# Patient Record
Sex: FEMALE | Race: WHITE | NOT HISPANIC OR LATINO | Employment: FULL TIME
[De-identification: names, ages, dates, MRNs, and addresses within clinical notes are randomized per-mention and may not be internally consistent; named-entity substitution may affect disease eponyms.]

---

## 2017-07-29 ENCOUNTER — HEALTH MAINTENANCE LETTER (OUTPATIENT)
Age: 57
End: 2017-07-29

## 2018-08-05 ENCOUNTER — HEALTH MAINTENANCE LETTER (OUTPATIENT)
Age: 58
End: 2018-08-05

## 2019-11-04 ENCOUNTER — HEALTH MAINTENANCE LETTER (OUTPATIENT)
Age: 59
End: 2019-11-04

## 2020-11-22 ENCOUNTER — HEALTH MAINTENANCE LETTER (OUTPATIENT)
Age: 60
End: 2020-11-22

## 2021-09-18 ENCOUNTER — HEALTH MAINTENANCE LETTER (OUTPATIENT)
Age: 61
End: 2021-09-18

## 2021-11-13 ENCOUNTER — HEALTH MAINTENANCE LETTER (OUTPATIENT)
Age: 61
End: 2021-11-13

## 2022-01-08 ENCOUNTER — HEALTH MAINTENANCE LETTER (OUTPATIENT)
Age: 62
End: 2022-01-08

## 2022-05-25 ENCOUNTER — APPOINTMENT (OUTPATIENT)
Dept: MRI IMAGING | Facility: HOSPITAL | Age: 62
End: 2022-05-25
Attending: EMERGENCY MEDICINE
Payer: COMMERCIAL

## 2022-05-25 ENCOUNTER — HOSPITAL ENCOUNTER (INPATIENT)
Facility: HOSPITAL | Age: 62
LOS: 2 days | Discharge: ANOTHER HOSPITAL | End: 2022-05-27
Attending: EMERGENCY MEDICINE | Admitting: INTERNAL MEDICINE
Payer: COMMERCIAL

## 2022-05-25 DIAGNOSIS — M85.80 OSTEOPENIA, UNSPECIFIED LOCATION: ICD-10-CM

## 2022-05-25 DIAGNOSIS — G93.2 BENIGN INTRACRANIAL HYPERTENSION: Primary | ICD-10-CM

## 2022-05-25 DIAGNOSIS — R51.9 HEADACHE ON TOP OF HEAD: ICD-10-CM

## 2022-05-25 PROBLEM — Z86.711 HISTORY OF PULMONARY EMBOLUS (PE): Status: ACTIVE | Noted: 2019-09-05

## 2022-05-25 PROBLEM — Z85.3 HISTORY OF BREAST CANCER IN FEMALE: Status: ACTIVE | Noted: 2020-09-22

## 2022-05-25 PROBLEM — G47.33 OSA (OBSTRUCTIVE SLEEP APNEA): Status: ACTIVE | Noted: 2019-09-05

## 2022-05-25 LAB
ALBUMIN SERPL-MCNC: 3.4 G/DL (ref 3.5–5)
ALP SERPL-CCNC: 156 U/L (ref 45–120)
ALT SERPL W P-5'-P-CCNC: 20 U/L (ref 0–45)
ANION GAP SERPL CALCULATED.3IONS-SCNC: 11 MMOL/L (ref 5–18)
AST SERPL W P-5'-P-CCNC: 15 U/L (ref 0–40)
BASOPHILS # BLD AUTO: 0.1 10E3/UL (ref 0–0.2)
BASOPHILS NFR BLD AUTO: 1 %
BILIRUB DIRECT SERPL-MCNC: 0.2 MG/DL
BILIRUB SERPL-MCNC: 0.4 MG/DL (ref 0–1)
BUN SERPL-MCNC: 12 MG/DL (ref 8–22)
C REACTIVE PROTEIN LHE: 8.1 MG/DL (ref 0–0.8)
C REACTIVE PROTEIN LHE: 8.2 MG/DL (ref 0–0.8)
CALCIUM SERPL-MCNC: 10 MG/DL (ref 8.5–10.5)
CHLORIDE BLD-SCNC: 99 MMOL/L (ref 98–107)
CO2 SERPL-SCNC: 27 MMOL/L (ref 22–31)
CREAT SERPL-MCNC: 0.8 MG/DL (ref 0.6–1.1)
EOSINOPHIL # BLD AUTO: 0.3 10E3/UL (ref 0–0.7)
EOSINOPHIL NFR BLD AUTO: 3 %
ERYTHROCYTE [DISTWIDTH] IN BLOOD BY AUTOMATED COUNT: 11.9 % (ref 10–15)
ERYTHROCYTE [SEDIMENTATION RATE] IN BLOOD BY WESTERGREN METHOD: 62 MM/HR (ref 0–20)
GFR SERPL CREATININE-BSD FRML MDRD: 84 ML/MIN/1.73M2
GLUCOSE BLD-MCNC: 104 MG/DL (ref 70–125)
HCT VFR BLD AUTO: 43.5 % (ref 35–47)
HGB BLD-MCNC: 14.8 G/DL (ref 11.7–15.7)
IMM GRANULOCYTES # BLD: 0.1 10E3/UL
IMM GRANULOCYTES NFR BLD: 1 %
INR PPP: 1.05 (ref 0.85–1.15)
LACTATE SERPL-SCNC: 1.3 MMOL/L (ref 0.7–2)
LYMPHOCYTES # BLD AUTO: 1.8 10E3/UL (ref 0.8–5.3)
LYMPHOCYTES NFR BLD AUTO: 19 %
MAGNESIUM SERPL-MCNC: 2 MG/DL (ref 1.8–2.6)
MCH RBC QN AUTO: 30.6 PG (ref 26.5–33)
MCHC RBC AUTO-ENTMCNC: 34 G/DL (ref 31.5–36.5)
MCV RBC AUTO: 90 FL (ref 78–100)
MONOCYTES # BLD AUTO: 1 10E3/UL (ref 0–1.3)
MONOCYTES NFR BLD AUTO: 10 %
NEUTROPHILS # BLD AUTO: 6.5 10E3/UL (ref 1.6–8.3)
NEUTROPHILS NFR BLD AUTO: 66 %
NRBC # BLD AUTO: 0 10E3/UL
NRBC BLD AUTO-RTO: 0 /100
PLATELET # BLD AUTO: 410 10E3/UL (ref 150–450)
POTASSIUM BLD-SCNC: 3.5 MMOL/L (ref 3.5–5)
PROT SERPL-MCNC: 7.9 G/DL (ref 6–8)
RBC # BLD AUTO: 4.83 10E6/UL (ref 3.8–5.2)
SARS-COV-2 RNA RESP QL NAA+PROBE: NEGATIVE
SODIUM SERPL-SCNC: 137 MMOL/L (ref 136–145)
TSH SERPL DL<=0.005 MIU/L-ACNC: 1.73 UIU/ML (ref 0.3–5)
WBC # BLD AUTO: 9.6 10E3/UL (ref 4–11)

## 2022-05-25 PROCEDURE — 85652 RBC SED RATE AUTOMATED: CPT | Performed by: EMERGENCY MEDICINE

## 2022-05-25 PROCEDURE — 84443 ASSAY THYROID STIM HORMONE: CPT | Performed by: EMERGENCY MEDICINE

## 2022-05-25 PROCEDURE — 82248 BILIRUBIN DIRECT: CPT | Performed by: EMERGENCY MEDICINE

## 2022-05-25 PROCEDURE — 250N000011 HC RX IP 250 OP 636: Performed by: INTERNAL MEDICINE

## 2022-05-25 PROCEDURE — 36415 COLL VENOUS BLD VENIPUNCTURE: CPT | Performed by: EMERGENCY MEDICINE

## 2022-05-25 PROCEDURE — 85014 HEMATOCRIT: CPT | Performed by: EMERGENCY MEDICINE

## 2022-05-25 PROCEDURE — 83605 ASSAY OF LACTIC ACID: CPT | Performed by: EMERGENCY MEDICINE

## 2022-05-25 PROCEDURE — C9803 HOPD COVID-19 SPEC COLLECT: HCPCS

## 2022-05-25 PROCEDURE — 87798 DETECT AGENT NOS DNA AMP: CPT | Performed by: INTERNAL MEDICINE

## 2022-05-25 PROCEDURE — 258N000003 HC RX IP 258 OP 636: Performed by: INTERNAL MEDICINE

## 2022-05-25 PROCEDURE — 70544 MR ANGIOGRAPHY HEAD W/O DYE: CPT

## 2022-05-25 PROCEDURE — 250N000011 HC RX IP 250 OP 636: Performed by: EMERGENCY MEDICINE

## 2022-05-25 PROCEDURE — 85610 PROTHROMBIN TIME: CPT | Performed by: EMERGENCY MEDICINE

## 2022-05-25 PROCEDURE — A9585 GADOBUTROL INJECTION: HCPCS | Performed by: EMERGENCY MEDICINE

## 2022-05-25 PROCEDURE — 255N000002 HC RX 255 OP 636: Performed by: EMERGENCY MEDICINE

## 2022-05-25 PROCEDURE — 86140 C-REACTIVE PROTEIN: CPT | Performed by: EMERGENCY MEDICINE

## 2022-05-25 PROCEDURE — 83735 ASSAY OF MAGNESIUM: CPT | Performed by: EMERGENCY MEDICINE

## 2022-05-25 PROCEDURE — 80053 COMPREHEN METABOLIC PANEL: CPT | Performed by: EMERGENCY MEDICINE

## 2022-05-25 PROCEDURE — 93005 ELECTROCARDIOGRAM TRACING: CPT | Performed by: EMERGENCY MEDICINE

## 2022-05-25 PROCEDURE — 70553 MRI BRAIN STEM W/O & W/DYE: CPT

## 2022-05-25 PROCEDURE — 87635 SARS-COV-2 COVID-19 AMP PRB: CPT | Performed by: EMERGENCY MEDICINE

## 2022-05-25 PROCEDURE — 250N000013 HC RX MED GY IP 250 OP 250 PS 637

## 2022-05-25 PROCEDURE — 87040 BLOOD CULTURE FOR BACTERIA: CPT | Performed by: EMERGENCY MEDICINE

## 2022-05-25 PROCEDURE — 96365 THER/PROPH/DIAG IV INF INIT: CPT

## 2022-05-25 PROCEDURE — 70549 MR ANGIOGRAPH NECK W/O&W/DYE: CPT

## 2022-05-25 PROCEDURE — 200N000001 HC R&B ICU

## 2022-05-25 PROCEDURE — 999N000157 HC STATISTIC RCP TIME EA 10 MIN

## 2022-05-25 PROCEDURE — 70544 MR ANGIOGRAPHY HEAD W/O DYE: CPT | Mod: XS

## 2022-05-25 PROCEDURE — 99223 1ST HOSP IP/OBS HIGH 75: CPT | Performed by: INTERNAL MEDICINE

## 2022-05-25 PROCEDURE — 96375 TX/PRO/DX INJ NEW DRUG ADDON: CPT

## 2022-05-25 PROCEDURE — 99285 EMERGENCY DEPT VISIT HI MDM: CPT | Mod: 25

## 2022-05-25 RX ORDER — DEXAMETHASONE SODIUM PHOSPHATE 4 MG/ML
4 INJECTION, SOLUTION INTRA-ARTICULAR; INTRALESIONAL; INTRAMUSCULAR; INTRAVENOUS; SOFT TISSUE EVERY 6 HOURS
Status: DISCONTINUED | OUTPATIENT
Start: 2022-05-25 | End: 2022-05-25

## 2022-05-25 RX ORDER — CEFAZOLIN SODIUM 1 G/50ML
1750 SOLUTION INTRAVENOUS EVERY 12 HOURS
Status: DISCONTINUED | OUTPATIENT
Start: 2022-05-25 | End: 2022-05-27

## 2022-05-25 RX ORDER — ONDANSETRON 4 MG/1
4 TABLET, ORALLY DISINTEGRATING ORAL EVERY 6 HOURS PRN
Status: DISCONTINUED | OUTPATIENT
Start: 2022-05-25 | End: 2022-05-27 | Stop reason: HOSPADM

## 2022-05-25 RX ORDER — AMPICILLIN 2 G/1
2 INJECTION, POWDER, FOR SOLUTION INTRAVENOUS EVERY 4 HOURS
Status: DISCONTINUED | OUTPATIENT
Start: 2022-05-25 | End: 2022-05-27

## 2022-05-25 RX ORDER — ALBUTEROL SULFATE 90 UG/1
2 AEROSOL, METERED RESPIRATORY (INHALATION) EVERY 6 HOURS PRN
COMMUNITY

## 2022-05-25 RX ORDER — LIDOCAINE 40 MG/G
CREAM TOPICAL
Status: DISCONTINUED | OUTPATIENT
Start: 2022-05-25 | End: 2022-05-27 | Stop reason: HOSPADM

## 2022-05-25 RX ORDER — ASPIRIN 81 MG/1
81 TABLET ORAL DAILY
Status: ON HOLD | COMMUNITY
End: 2022-05-27

## 2022-05-25 RX ORDER — GADOBUTROL 604.72 MG/ML
12 INJECTION INTRAVENOUS ONCE
Status: COMPLETED | OUTPATIENT
Start: 2022-05-25 | End: 2022-05-25

## 2022-05-25 RX ORDER — ONDANSETRON 2 MG/ML
4 INJECTION INTRAMUSCULAR; INTRAVENOUS EVERY 6 HOURS PRN
Status: DISCONTINUED | OUTPATIENT
Start: 2022-05-25 | End: 2022-05-27 | Stop reason: HOSPADM

## 2022-05-25 RX ORDER — AMOXICILLIN 250 MG
1 CAPSULE ORAL 2 TIMES DAILY
Status: DISCONTINUED | OUTPATIENT
Start: 2022-05-25 | End: 2022-05-27 | Stop reason: HOSPADM

## 2022-05-25 RX ORDER — HYDROCHLOROTHIAZIDE 25 MG/1
25 TABLET ORAL DAILY
COMMUNITY

## 2022-05-25 RX ORDER — ALBUTEROL SULFATE 90 UG/1
2 AEROSOL, METERED RESPIRATORY (INHALATION) EVERY 6 HOURS PRN
Status: DISCONTINUED | OUTPATIENT
Start: 2022-05-25 | End: 2022-05-27 | Stop reason: HOSPADM

## 2022-05-25 RX ORDER — OXYCODONE HYDROCHLORIDE 5 MG/1
5 TABLET ORAL EVERY 6 HOURS PRN
Status: DISCONTINUED | OUTPATIENT
Start: 2022-05-25 | End: 2022-05-27 | Stop reason: HOSPADM

## 2022-05-25 RX ORDER — ACETAMINOPHEN 325 MG/1
975 TABLET ORAL ONCE
Status: COMPLETED | OUTPATIENT
Start: 2022-05-25 | End: 2022-05-25

## 2022-05-25 RX ORDER — DEXAMETHASONE SODIUM PHOSPHATE 10 MG/ML
6 INJECTION, SOLUTION INTRAMUSCULAR; INTRAVENOUS EVERY 6 HOURS
Status: DISCONTINUED | OUTPATIENT
Start: 2022-05-25 | End: 2022-05-26

## 2022-05-25 RX ORDER — CEFTRIAXONE 2 G/1
2 INJECTION, POWDER, FOR SOLUTION INTRAMUSCULAR; INTRAVENOUS EVERY 12 HOURS
Status: DISCONTINUED | OUTPATIENT
Start: 2022-05-25 | End: 2022-05-27

## 2022-05-25 RX ORDER — METOCLOPRAMIDE HYDROCHLORIDE 5 MG/ML
10 INJECTION INTRAMUSCULAR; INTRAVENOUS ONCE
Status: DISCONTINUED | OUTPATIENT
Start: 2022-05-25 | End: 2022-05-27 | Stop reason: HOSPADM

## 2022-05-25 RX ORDER — HYDROCHLOROTHIAZIDE 25 MG/1
25 TABLET ORAL DAILY
Status: DISCONTINUED | OUTPATIENT
Start: 2022-05-26 | End: 2022-05-27 | Stop reason: HOSPADM

## 2022-05-25 RX ORDER — ACETAMINOPHEN 650 MG/1
650 SUPPOSITORY RECTAL EVERY 6 HOURS PRN
Status: DISCONTINUED | OUTPATIENT
Start: 2022-05-25 | End: 2022-05-27 | Stop reason: HOSPADM

## 2022-05-25 RX ORDER — HYDRALAZINE HYDROCHLORIDE 10 MG/1
10 TABLET, FILM COATED ORAL 4 TIMES DAILY PRN
Status: DISCONTINUED | OUTPATIENT
Start: 2022-05-25 | End: 2022-05-27 | Stop reason: HOSPADM

## 2022-05-25 RX ORDER — ACETAMINOPHEN 325 MG/1
650 TABLET ORAL EVERY 6 HOURS PRN
Status: DISCONTINUED | OUTPATIENT
Start: 2022-05-25 | End: 2022-05-27 | Stop reason: HOSPADM

## 2022-05-25 RX ORDER — VALSARTAN 80 MG/1
80 TABLET ORAL DAILY
Status: DISCONTINUED | OUTPATIENT
Start: 2022-05-26 | End: 2022-05-27 | Stop reason: HOSPADM

## 2022-05-25 RX ORDER — CEFTRIAXONE 2 G/1
2 INJECTION, POWDER, FOR SOLUTION INTRAMUSCULAR; INTRAVENOUS EVERY 24 HOURS
Status: DISCONTINUED | OUTPATIENT
Start: 2022-05-25 | End: 2022-05-25

## 2022-05-25 RX ORDER — AMOXICILLIN 250 MG
2 CAPSULE ORAL 2 TIMES DAILY
Status: DISCONTINUED | OUTPATIENT
Start: 2022-05-25 | End: 2022-05-27 | Stop reason: HOSPADM

## 2022-05-25 RX ORDER — SODIUM CHLORIDE 9 MG/ML
INJECTION, SOLUTION INTRAVENOUS CONTINUOUS
Status: DISCONTINUED | OUTPATIENT
Start: 2022-05-25 | End: 2022-05-27

## 2022-05-25 RX ADMIN — SODIUM CHLORIDE, PRESERVATIVE FREE: 5 INJECTION INTRAVENOUS at 23:02

## 2022-05-25 RX ADMIN — DEXAMETHASONE SODIUM PHOSPHATE 6 MG: 10 INJECTION, SOLUTION INTRAMUSCULAR; INTRAVENOUS at 22:57

## 2022-05-25 RX ADMIN — CEFTRIAXONE SODIUM 2 G: 2 INJECTION, POWDER, FOR SOLUTION INTRAMUSCULAR; INTRAVENOUS at 23:02

## 2022-05-25 RX ADMIN — GADOBUTROL 12 ML: 604.72 INJECTION INTRAVENOUS at 16:14

## 2022-05-25 RX ADMIN — ACETAMINOPHEN 975 MG: 325 TABLET ORAL at 18:41

## 2022-05-25 ASSESSMENT — ACTIVITIES OF DAILY LIVING (ADL)
ADLS_ACUITY_SCORE: 35

## 2022-05-25 ASSESSMENT — VISUAL ACUITY: OU: OTHER (SEE COMMENT)

## 2022-05-25 NOTE — ED NOTES
"ED Triage Provider Note  [facility]  Encounter Date: May 25, 2022      The patient was seen in triage to expedite ED workup.     History:  Chief Complaint   Patient presents with     Vision Changes     Headache     Parul Veliz is a 61 year old female with history of prior breast cancer, prior provoked PE (not on AC other than baby Aspirin daily), HTN presenting for evaluation of headache and vision changes. Reports 1 week of mild intermittent headache to front & left of head; no fall or injury. Yesterday at 12pm (about 26 hours ago) started having a black spot in the medial aspect of her vision in both eyes; spot does not move. No eye pain, redness, drainage. Denies any numbness, tingling, focal weakness, difficulty speaking, difficulty walking. No neck pain, fevers, sweats, chills. Has never had anything like this before. Concerned about possible stroke.    Review of Systems:  - Positive for headache, vision changes  - Negative for numbness, tingling, focal weakness, difficulty speaking, difficulty walking, fevers, sweats, chills, neck pain    Vitals:  BP (!) 185/94   Pulse 100   Temp 97.6  F (36.4  C) (Temporal)   Resp 16   Ht 1.702 m (5' 7\")   Wt 117.9 kg (260 lb)   SpO2 98%   BMI 40.72 kg/m      Brief Exam:  Sitting comfortably in chair, PERRL @ 3mm with EOMI, no eye drainage or proptosis, no visual field cut to confrontation testing, CN 2-12 intact, clear speech with no dysarthria or aphasia, negative pronator drift, 5/5 strength to all extremities with sensation to light touch intact, no tremor, steady unaccompanied gait    Otherwise normal work of breathing, normal phonation, no meningismus, no anterior neck tenderness      Medical Decision Making:  Patient arriving to the ED with problem as above. A medical screening exam was performed. Orders initiated from triage to expedite ED workup.    Outside stroke activation window but certainly would benefit from MRI. Will obtain " MRV as well to rule out CVT. Prior breast cancer warrants imaging to rule out recurrent met. Will obtain EKG, blood in the meantime as well. Doubt infectious process such as meningitis or encephalitis. Atypical migraine on the differential but has never had migraines in the past.    Orders Placed This Encounter   Procedures     MR Brain w/o & w Contrast     MRA Brain ([place]) wo Contrast     MRA Neck (Carotids) wo & w Contrast     MRV Brain wo Contrast     INR     Magnesium     TSH with free T4 reflex     Basic metabolic panel     Hepatic function panel     CRP inflammation     ECG 12-LEAD WITH MUSE (LHE)     Peripheral IV catheter     CBC with platelets differential           The patient is appropriate to wait in triage until ED room available.      Duane Daley MD  05/25/22  Emergency Medicine  [facility] EMERGENCY DEPARTMENT  [facility] [facility] [id number] [phone]  Dept: [phone]     Duane Daley MD  05/25/22 7643

## 2022-05-25 NOTE — ED PROVIDER NOTES
EMERGENCY DEPARTMENT ENCOUNTER            IMPRESSION:  Headache and visual disturbance      MEDICAL DECISION MAKING:  Patient evaluated for 1 week history of headache.  She also notes some visual disturbance.  No trauma or infectious symptoms.  No other focal neurologic symptoms.  No vomiting    On exam her blood pressure was normal.  Initially there was no fever.  Her cranial nerves are intact.  She does have however central field of vision loss.  Neurologic exam otherwise normal.  No neck stiffness.    She was given antiemetics for pain medication    EKG does not show acute arrhythmia or ischemia    MRI showed narrowing of the distal transverse sinus which can be seen in intracranial hypertension.    Case was discussed with on-call neurology who recommended neurologic consult and inflammatory markers    Patient was given Tylenol for headache    Case was discussed with hospitalist for admission.  As I was speaking with the hospitalist repeat vitals showed patient had a fever.      I reevaluated the patient and she does not appear ill.  She has no new symptoms.  I have added lactate blood cultures and COVID test          =================================================================  CHIEF COMPLAINT:  Chief Complaint   Patient presents with     Vision Changes     Headache         HPI  Parul Veliz is a 61 year old female with a history of cervical cancer s/p total abdominal hysterectomy, breast cancer s/p bilateral mastectomy, postoperative PE previously anticoagulated on Lovenox, HTN, HLD, RACHID on CPAP, and tobacco use disorder, who presents to the ED by private vehicle for evaluation of headache.    Patient reports a one week history of intermittent left sided and frontal headache. Yesterday afternoon around 1200 (~26 hours PTA) she developed a black spot in the medial aspect of her vision in both eyes, noting the spot remains in one spot and does not move. Denies eye pain, erythema, or drainage. She has  never had symptoms like this before. Patient otherwise denies numbness, paresthesias, focal weakness, gait abnormalities, speech changes, neck pain, fever, chills, or any other symptoms or concerns at this time.     I, Yasemin Howard, am serving as a scribe to document services personally performed by Dr. Marco Mehta MD, based on my observation and the provider's statements to me. I, Dr. Marco Mehta MD attest that Yasemin Howard is acting in a scribe capacity, has observed my performance of the services and has documented them in accordance with my direction.      REVIEW OF SYSTEMS   Constitutional: Denies fever, chills, unintentional weight loss or fatigue   Eyes: Positive for black spot in medial aspect of vision in both eyes. Denies discharge    HENT: Denies sore throat, ear pain or neck pain  Respiratory: Denies cough or shortness of breath    Cardiovascular: Denies chest pain, palpitations or leg swelling  GI: Denies abdominal pain, nausea, vomiting, or dark, bloody stools.    : Denies hematuria, dysuria, or flank pain  Musculoskeletal: Denies any new back pain or new muscle/joint pains  Skin: Denies rash or wound  Neurologic: Positive for intermittent left sided and frontal headache. Denies new weakness, focal weakness, or sensory changes    Lymphatic: Denies swollen glands    Psychiatric: Denies depression, suicidal ideation or homicidal ideation.    Remainder of systems reviewed, unless noted in HPI all others negative.      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      Hemangioma of skin      HTN (hypertension)      Infiltrating ductal carcinoma of LEFT breast, BX 10/9/12 10/15/2012     Lumbar disc disease      Nephrolithiasis      Obesity      RACHID (obstructive sleep apnea)      PE (pulmonary embolism) 2008    during pelvic surgery     Pericardial cyst   "      PAST SURGICAL HISTORY:  Past Surgical History:   Procedure Laterality Date     CHOLECYSTECTOMY, LAPOROSCOPIC  6/2011    Cholecystectomy, Laparoscopic     COLONOSCOPY  2011     HYSTERECTOMY, SANDRA  2/08    radical hysterectomy, BSO, hx of cx ca         CURRENT MEDICATIONS:    albuterol (PROAIR HFA/PROVENTIL HFA/VENTOLIN HFA) 108 (90 Base) MCG/ACT inhaler  aspirin 81 MG EC tablet  cetirizine (ZYRTEC) 10 MG tablet  hydrochlorothiazide (HYDRODIURIL) 25 MG tablet  valsartan (DIOVAN) 80 MG tablet        ALLERGIES:  Allergies   Allergen Reactions     Pcn [Penicillins]        FAMILY HISTORY:  Family History   Problem Relation Age of Onset     Cerebrovascular Disease Mother      Breast Cancer Maternal Aunt      Breast Cancer Paternal Aunt        SOCIAL HISTORY:   Social History     Socioeconomic History     Marital status: Single     Number of children: 0   Occupational History     Occupation:    Tobacco Use     Smoking status: Current Every Day Smoker     Packs/day: 1.00     Years: 20.00     Pack years: 20.00     Types: Cigarettes     Smokeless tobacco: Never Used     Tobacco comment: 1 pp week   Substance and Sexual Activity     Alcohol use: Yes     Comment: 1-2/week     Drug use: No     Sexual activity: Not Currently   Other Topics Concern      Service No     Blood Transfusions No     Caffeine Concern No     Occupational Exposure No     Hobby Hazards No     Sleep Concern Yes     Comment: Problems with staying asleep and getting to sleep     Stress Concern Yes     Comment: Tax season     Weight Concern No     Special Diet Yes     Comment: No green leafy  vegies     Back Care No     Exercise No     Bike Helmet No     Seat Belt Yes     Self-Exams Yes   Social History Narrative    [employer]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  ENT: Mucous membranes moist. Posterior oropharynx appears normal.  Eyes: Pupils midrange and reactive ,no conjunctival discharge, central visual field loss  Neck: No lymphadenopathy, no stridor, supple, no soft tissue swelling  Chest: No tenderness   Respiratory: Respirations even, unlabored. Lungs clear to ascultation bilaterally, in no acute respiratory distress.  Cardiovascular: Regular rate and rhythm.+2 radial pulses, equal bilaterally.  No murmurs.   GI: Abdomen soft, non-tender to palpation in all 4 quadrants. No guarding or rebound. Bowel sounds intact on all 4 quadrants.   Back: No CVA tenderness.    Musculoskeletal: Moves all 4 extremities equally, strength symmetrical on bilateral uppers and lowers.  No peripheral edema  Integument: Warm, dry. No rash. No bruising or petechiae.  Lymphatic: No cervical lymphadenopathy  Neurologic: Alert & oriented x 3. Normal speech. Grossly normal motor and sensory function. No focal deficits noted.  NIHSS = 0  Psychiatric: Normal mood and affect. Normal judgement.    ED COURSE:  2:30 PM I met with the patient, obtained history, performed an initial exam, and discussed options and plan for diagnostics and treatment here in the ED.  5:25 PM I paged for Neurology.   5:38 PM I spoke with Dr. Sky, Neurology, who recommends admission for pain management and Neurology consult. I updated the patient on these recommendations and she is in agreement. I paged for the hospitalist.   6:47 PM I spoke with Dr hospitalist, who accepts the patient for admission.      LAB:  All pertinent labs reviewed and interpreted.  Results for orders placed or performed during the hospital encounter of 05/25/22   MR Brain w/o & w Contrast    Impression    IMPRESSION:  HEAD MRI:   1.  No acute intracranial abnormality  2.  Nonspecific scattered small foci of T2 FLAIR hyperintensity in the cerebral white  matter can be seen in setting of chronic small vessel ischemic changes, prior trauma or insult, or migraines. Demyelination is not favored.    HEAD MRA:   1.  Normal MRA "[place] [employer] of [place].    NECK MRA:  1.  Normal neck MRA.    HEAD MRV:  1.  Severe stenosis of the distal transverse sinuses bilaterally, which is nonspecific but can be seen in setting of idiopathic intracranial hypertension.  2.  No additional cervical stenosis or occlusion. No thrombosis.   MRA Brain ([place]) wo Contrast    Impression    IMPRESSION:  HEAD MRI:   1.  No acute intracranial abnormality  2.  Nonspecific scattered small foci of T2 FLAIR hyperintensity in the cerebral white matter can be seen in setting of chronic small vessel ischemic changes, prior trauma or insult, or migraines. Demyelination is not favored.    HEAD MRA:   1.  Normal MRA "[place] [employer] [place].    NECK MRA:  1.  Normal neck MRA.    HEAD MRV:  1.  Severe stenosis of the distal transverse sinuses bilaterally, which is nonspecific but can be seen in setting of idiopathic intracranial hypertension.  2.  No additional cervical stenosis or occlusion. No thrombosis.   MRA Neck (Carotids) wo & w Contrast    Impression    IMPRESSION:  HEAD MRI:   1.  No acute intracranial abnormality  2.  Nonspecific scattered small foci of T2 FLAIR hyperintensity in the cerebral white matter can be seen in setting of chronic small vessel ischemic changes, prior trauma or insult, or migraines. Demyelination is not favored.    HEAD MRA:   1.  Normal MRA "[place] [employer] [place].    NECK MRA:  1.  Normal neck MRA.    HEAD MRV:  1.  Severe stenosis of the distal transverse sinuses bilaterally, which is nonspecific but can be seen in setting of idiopathic intracranial hypertension.  2.  No additional cervical stenosis or occlusion. No thrombosis.   MRV Brain wo Contrast    Impression    IMPRESSION:  HEAD MRI:   1.  No acute intracranial abnormality  2.  Nonspecific scattered small foci of T2 FLAIR  hyperintensity in the cerebral white matter can be seen in setting of chronic small vessel ischemic changes, prior trauma or insult, or migraines. Demyelination is not favored.    HEAD MRA:   1.  Normal MRA [place] of [place].    NECK MRA:  1.  Normal neck MRA.    HEAD MRV:  1.  Severe stenosis of the distal transverse sinuses bilaterally, which is nonspecific but can be seen in setting of idiopathic intracranial hypertension.  2.  No additional cervical stenosis or occlusion. No thrombosis.   Result Value Ref Range    INR 1.05 0.85 - 1.15   Result Value Ref Range    Magnesium 2.0 1.8 - 2.6 mg/dL   TSH with free T4 reflex   Result Value Ref Range    TSH 1.73 0.30 - 5.00 uIU/mL   Basic metabolic panel   Result Value Ref Range    Sodium 137 136 - 145 mmol/L    Potassium 3.5 3.5 - 5.0 mmol/L    Chloride 99 98 - 107 mmol/L    Carbon Dioxide (CO2) 27 22 - 31 mmol/L    Anion Gap 11 5 - 18 mmol/L    Urea Nitrogen 12 8 - 22 mg/dL    Creatinine 0.80 0.60 - 1.10 mg/dL    Calcium 10.0 8.5 - 10.5 mg/dL    Glucose 104 70 - 125 mg/dL    GFR Estimate 84 >60 mL/min/1.73m2   Hepatic function panel   Result Value Ref Range    Bilirubin Total 0.4 0.0 - 1.0 mg/dL    Bilirubin Direct 0.2 <=0.5 mg/dL    Protein Total 7.9 6.0 - 8.0 g/dL    Albumin 3.4 (L) 3.5 - 5.0 g/dL    Alkaline Phosphatase 156 (H) 45 - 120 U/L    AST 15 0 - 40 U/L    ALT 20 0 - 45 U/L   CRP inflammation   Result Value Ref Range    CRP 8.1 (H) 0.0 - 0.8 mg/dL   CBC with platelets and differential   Result Value Ref Range    WBC Count 9.6 4.0 - 11.0 10e3/uL    RBC Count 4.83 3.80 - 5.20 10e6/uL    Hemoglobin 14.8 11.7 - 15.7 g/dL    Hematocrit 43.5 35.0 - 47.0 %    MCV 90 78 - 100 fL    MCH 30.6 26.5 - 33.0 pg    MCHC 34.0 31.5 - 36.5 g/dL    RDW 11.9 10.0 - 15.0 %    Platelet Count 410 150 - 450 10e3/uL    % Neutrophils 66 %    % Lymphocytes 19 %    % Monocytes 10 %    % Eosinophils 3 %    % Basophils 1 %    % Immature Granulocytes 1 %    NRBCs per 100 WBC 0 <1 /100     Absolute Neutrophils 6.5 1.6 - 8.3 10e3/uL    Absolute Lymphocytes 1.8 0.8 - 5.3 10e3/uL    Absolute Monocytes 1.0 0.0 - 1.3 10e3/uL    Absolute Eosinophils 0.3 0.0 - 0.7 10e3/uL    Absolute Basophils 0.1 0.0 - 0.2 10e3/uL    Absolute Immature Granulocytes 0.1 <=0.4 10e3/uL    Absolute NRBCs 0.0 10e3/uL       RADIOLOGY:  Reviewed all pertinent imaging. Please see official radiology report.  MR Brain w/o & w Contrast   Final Result   IMPRESSION:   HEAD MRI:    1.  No acute intracranial abnormality   2.  Nonspecific scattered small foci of T2 FLAIR hyperintensity in the cerebral white matter can be seen in setting of chronic small vessel ischemic changes, prior trauma or insult, or migraines. Demyelination is not favored.      HEAD MRA:    1.  Normal MRA [place].      NECK MRA:   1.  Normal neck MRA.      HEAD MRV:   1.  Severe stenosis of the distal transverse sinuses bilaterally, which is nonspecific but can be seen in setting of idiopathic intracranial hypertension.   2.  No additional cervical stenosis or occlusion. No thrombosis.      MRA Neck (Carotids) wo & w Contrast   Final Result   IMPRESSION:   HEAD MRI:    1.  No acute intracranial abnormality   2.  Nonspecific scattered small foci of T2 FLAIR hyperintensity in the cerebral white matter can be seen in setting of chronic small vessel ischemic changes, prior trauma or insult, or migraines. Demyelination is not favored.      HEAD MRA:    1.  Normal MRA [place].      NECK MRA:   1.  Normal neck MRA.      HEAD MRV:   1.  Severe stenosis of the distal transverse sinuses bilaterally, which is nonspecific but can be seen in setting of idiopathic intracranial hypertension.   2.  No additional cervical stenosis or occlusion. No thrombosis.      MRV Brain wo Contrast   Final Result   IMPRESSION:   HEAD MRI:    1.  No acute intracranial abnormality   2.  Nonspecific scattered small foci of T2 FLAIR hyperintensity in the cerebral white matter can be  seen in setting of chronic small vessel ischemic changes, prior trauma or insult, or migraines. Demyelination is not favored.      HEAD MRA:    1.  Normal MRA [place].      NECK MRA:   1.  Normal neck MRA.      HEAD MRV:   1.  Severe stenosis of the distal transverse sinuses bilaterally, which is nonspecific but can be seen in setting of idiopathic intracranial hypertension.   2.  No additional cervical stenosis or occlusion. No thrombosis.      MRA Brain ([place] of [place]) wo Contrast   Final Result   IMPRESSION:   HEAD MRI:    1.  No acute intracranial abnormality   2.  Nonspecific scattered small foci of T2 FLAIR hyperintensity in the cerebral white matter can be seen in setting of chronic small vessel ischemic changes, prior trauma or insult, or migraines. Demyelination is not favored.      HEAD MRA:    1.  Normal MRA [place].      NECK MRA:   1.  Normal neck MRA.      HEAD MRV:   1.  Severe stenosis of the distal transverse sinuses bilaterally, which is nonspecific but can be seen in setting of idiopathic intracranial hypertension.   2.  No additional cervical stenosis or occlusion. No thrombosis.           EKG:    Performed at: 14:10    Impression: Sinus rhythm. Left axis deviation. Low voltage QRS. Incomplete RBBB. Abnormal EKG.     Rate: 92 bpm  Rhythm: Sinus  IL Interval: 164 ms  QRS Interval: 96 ms  QTc Interval: 427 ms  ST Changes: No ischemic changes  Comparison: When compared to EKG from 12/23/2008, no significant change was found.    I have independently reviewed and interpreted the EKG(s) documented above.        MEDICATIONS GIVEN IN THE EMERGENCY:  Medications   metoclopramide (REGLAN) injection 10 mg (10 mg Intravenous Not Given 5/25/22 1416)   gadobutrol (GADAVIST) injection 12 mL (12 mLs Intravenous Given 5/25/22 1614)   acetaminophen (TYLENOL) tablet 975 mg (975 mg Oral Given 5/25/22 1841)           NEW PRESCRIPTIONS STARTED AT TODAY'S ER VISIT:  New Prescriptions    No  medications on file          FINAL DIAGNOSIS:    ICD-10-CM    1. Headache on top of head  R51.9             At the conclusion of the encounter I discussed the results of all of the tests and the disposition. The questions were answered. The patient or family acknowledged understanding and was agreeable with the care plan.     NAME: Parul Veliz  AGE: 61 year old female  YOB: 1960  MRN: [id number]  EVALUATION DATE & TIME: 5/25/2022  1:55 PM    [facility] [place]    ED PROVIDER: Marco Mehta M.D.      Yasemin CROWLEY, am serving as a scribe to document services personally performed by Dr. Marco Mehta based on my observation and the provider's statements to me. I, Marco Mehta MD attest that Yasemin Howard is acting in a scribe capacity, has observed my performance of the services and has documented them in accordance with my direction.    Marco Mehta M.D.  Emergency Medicine  [facility] EMERGENCY DEPARTMENT  [facility] [facility] [id number]  [phone]  Dept: [phone]  5/25/2022        Marco Mehta MD  05/25/22 [id number]       Marco Mehta MD  05/25/22 7396

## 2022-05-25 NOTE — PHARMACY-ADMISSION MEDICATION HISTORY
Pharmacy Note - Admission Medication History    Pertinent Provider Information: None     ______________________________________________________________________    Prior To Admission (PTA) med list completed and updated in EMR.       PTA Med List   Medication Sig Last Dose     albuterol (PROAIR HFA/PROVENTIL HFA/VENTOLIN HFA) 108 (90 Base) MCG/ACT inhaler Inhale 2 puffs into the lungs every 6 hours as needed for shortness of breath / dyspnea or wheezing Unknown     aspirin 81 MG EC tablet Take 81 mg by mouth daily 5/25/2022     cetirizine (ZYRTEC) 10 MG tablet Take 10 mg by mouth daily 5/25/2022     hydrochlorothiazide (HYDRODIURIL) 25 MG tablet Take 25 mg by mouth daily 5/25/2022     valsartan (DIOVAN) 80 MG tablet Take 1 tablet by mouth daily. 5/25/2022       [facility]  Method of interview communication: in-person    Summary of Changes to PTA Med List  New: hydrochlorothiazide, albuterol inhaler  Discontinued: amlodipine, albuterol tab, azithromycin  Changed: cetirizine frequency    Patient was asked about OTC/herbal products specifically.  PTA med list reflects this.    In the past week, patient estimated taking medication this percent of the time:  greater than 90%.    Patient does not anticipate needing any multi-use medications during admission.    The information provided in this note is only as accurate as the sources available at the time of the update(s).    Thank you for the opportunity to participate in the care of this patient.    Chelsi Torres [facility]  5/25/2022 6:17 PM

## 2022-05-25 NOTE — ED NOTES
Patient up to rest room. Now sitting on the side of the bed. Blankets have been provided for additional warmth. Coffee has been given to patient.

## 2022-05-25 NOTE — ED TRIAGE NOTES
Pt here with headache that she has had for about a week. Yesterday around 1200 she started to have vision changes. Has a small area that is blacked out in both eyes that she has no vision at all. Strength equal, no facial droop.      Triage Assessment     Row Name 05/25/22 1346       Triage Assessment (Adult)    Airway WDL WDL       Respiratory WDL    Respiratory WDL WDL       Skin Circulation/Temperature WDL    Skin Circulation/Temperature WDL WDL       Cardiac WDL    Cardiac WDL WDL       Peripheral/Neurovascular WDL    Peripheral Neurovascular WDL X       Cognitive/Neuro/Behavioral WDL    Cognitive/Neuro/Behavioral WDL WDL

## 2022-05-26 ENCOUNTER — APPOINTMENT (OUTPATIENT)
Dept: INTERVENTIONAL RADIOLOGY/VASCULAR | Facility: HOSPITAL | Age: 62
End: 2022-05-26
Attending: NURSE PRACTITIONER
Payer: COMMERCIAL

## 2022-05-26 PROBLEM — G93.2 BENIGN INTRACRANIAL HYPERTENSION: Status: ACTIVE | Noted: 2022-05-26

## 2022-05-26 LAB
ANION GAP SERPL CALCULATED.3IONS-SCNC: 8 MMOL/L (ref 5–18)
APPEARANCE CSF: CLEAR
ATRIAL RATE - MUSE: 92 BPM
BASOPHILS # BLD AUTO: 0 10E3/UL (ref 0–0.2)
BASOPHILS NFR BLD AUTO: 0 %
BUN SERPL-MCNC: 11 MG/DL (ref 8–22)
C REACTIVE PROTEIN LHE: 7.1 MG/DL (ref 0–0.8)
CALCIUM SERPL-MCNC: 8.9 MG/DL (ref 8.5–10.5)
CHLORIDE BLD-SCNC: 106 MMOL/L (ref 98–107)
CO2 SERPL-SCNC: 24 MMOL/L (ref 22–31)
COLOR CSF: COLORLESS
CREAT SERPL-MCNC: 0.69 MG/DL (ref 0.6–1.1)
DIASTOLIC BLOOD PRESSURE - MUSE: NORMAL MMHG
EOSINOPHIL # BLD AUTO: 0 10E3/UL (ref 0–0.7)
EOSINOPHIL NFR BLD AUTO: 0 %
ERYTHROCYTE [DISTWIDTH] IN BLOOD BY AUTOMATED COUNT: 11.8 % (ref 10–15)
GFR SERPL CREATININE-BSD FRML MDRD: >90 ML/MIN/1.73M2
GLUCOSE BLD-MCNC: 140 MG/DL (ref 70–125)
GLUCOSE CSF-MCNC: 73 MG/DL (ref 40–75)
GRAM STAIN RESULT: NORMAL
HCT VFR BLD AUTO: 36.3 % (ref 35–47)
HGB BLD-MCNC: 12.5 G/DL (ref 11.7–15.7)
IMM GRANULOCYTES # BLD: 0.1 10E3/UL
IMM GRANULOCYTES NFR BLD: 1 %
INTERPRETATION ECG - MUSE: NORMAL
LYMPHOCYTES # BLD AUTO: 0.8 10E3/UL (ref 0.8–5.3)
LYMPHOCYTES NFR BLD AUTO: 14 %
Lab: NORMAL
MCH RBC QN AUTO: 30.1 PG (ref 26.5–33)
MCHC RBC AUTO-ENTMCNC: 34.4 G/DL (ref 31.5–36.5)
MCV RBC AUTO: 88 FL (ref 78–100)
MONOCYTES # BLD AUTO: 0.1 10E3/UL (ref 0–1.3)
MONOCYTES NFR BLD AUTO: 2 %
NEUTROPHILS # BLD AUTO: 4.7 10E3/UL (ref 1.6–8.3)
NEUTROPHILS NFR BLD AUTO: 83 %
NRBC # BLD AUTO: 0 10E3/UL
NRBC BLD AUTO-RTO: 0 /100
P AXIS - MUSE: 38 DEGREES
PERFORMING LABORATORY: NORMAL
PLATELET # BLD AUTO: 397 10E3/UL (ref 150–450)
POTASSIUM BLD-SCNC: 3.7 MMOL/L (ref 3.5–5)
PR INTERVAL - MUSE: 164 MS
PROCALCITONIN SERPL-MCNC: 0.02 NG/ML (ref 0–0.49)
PROT CSF-MCNC: 23 MG/DL (ref 15–45)
QRS DURATION - MUSE: 96 MS
QT - MUSE: 346 MS
QTC - MUSE: 427 MS
R AXIS - MUSE: -32 DEGREES
RBC # BLD AUTO: 4.15 10E6/UL (ref 3.8–5.2)
RBC # CSF MANUAL: 4 /UL (ref 0–2)
SODIUM SERPL-SCNC: 138 MMOL/L (ref 136–145)
SPECIMEN STATUS: NORMAL
SYSTOLIC BLOOD PRESSURE - MUSE: NORMAL MMHG
T AXIS - MUSE: 44 DEGREES
TEST NAME: NORMAL
TUBE # CSF: 4
VENTRICULAR RATE- MUSE: 92 BPM
WBC # BLD AUTO: 5.6 10E3/UL (ref 4–11)
WBC # CSF MANUAL: 2 /UL (ref 0–5)

## 2022-05-26 PROCEDURE — 82310 ASSAY OF CALCIUM: CPT | Performed by: INTERNAL MEDICINE

## 2022-05-26 PROCEDURE — 258N000003 HC RX IP 258 OP 636: Performed by: INTERNAL MEDICINE

## 2022-05-26 PROCEDURE — 86140 C-REACTIVE PROTEIN: CPT | Performed by: INTERNAL MEDICINE

## 2022-05-26 PROCEDURE — 009U30Z DRAINAGE OF SPINAL CANAL WITH DRAINAGE DEVICE, PERCUTANEOUS APPROACH: ICD-10-PCS | Performed by: RADIOLOGY

## 2022-05-26 PROCEDURE — 84145 PROCALCITONIN (PCT): CPT | Performed by: INTERNAL MEDICINE

## 2022-05-26 PROCEDURE — 87070 CULTURE OTHR SPECIMN AEROBIC: CPT | Performed by: INTERNAL MEDICINE

## 2022-05-26 PROCEDURE — 96366 THER/PROPH/DIAG IV INF ADDON: CPT

## 2022-05-26 PROCEDURE — 272N000188 HC ACCESSORY CR12

## 2022-05-26 PROCEDURE — 83605 ASSAY OF LACTIC ACID: CPT | Performed by: INTERNAL MEDICINE

## 2022-05-26 PROCEDURE — 87476 LYME DIS DNA AMP PROBE: CPT | Performed by: INTERNAL MEDICINE

## 2022-05-26 PROCEDURE — 84999 UNLISTED CHEMISTRY PROCEDURE: CPT | Performed by: INTERNAL MEDICINE

## 2022-05-26 PROCEDURE — 999N000127 HC STATISTIC PERIPHERAL IV START W US GUIDANCE

## 2022-05-26 PROCEDURE — 87483 CNS DNA AMP PROBE TYPE 12-25: CPT | Performed by: INTERNAL MEDICINE

## 2022-05-26 PROCEDURE — C1729 CATH, DRAINAGE: HCPCS

## 2022-05-26 PROCEDURE — 84157 ASSAY OF PROTEIN OTHER: CPT | Performed by: INTERNAL MEDICINE

## 2022-05-26 PROCEDURE — 99233 SBSQ HOSP IP/OBS HIGH 50: CPT | Performed by: INTERNAL MEDICINE

## 2022-05-26 PROCEDURE — 96367 TX/PROPH/DG ADDL SEQ IV INF: CPT

## 2022-05-26 PROCEDURE — 62329 THER SPI PNXR CSF FLUOR/CT: CPT

## 2022-05-26 PROCEDURE — 84590 ASSAY OF VITAMIN A: CPT | Performed by: PSYCHIATRY & NEUROLOGY

## 2022-05-26 PROCEDURE — 250N000011 HC RX IP 250 OP 636: Performed by: PSYCHIATRY & NEUROLOGY

## 2022-05-26 PROCEDURE — 87205 SMEAR GRAM STAIN: CPT | Performed by: INTERNAL MEDICINE

## 2022-05-26 PROCEDURE — 86618 LYME DISEASE ANTIBODY: CPT | Performed by: INTERNAL MEDICINE

## 2022-05-26 PROCEDURE — 86617 LYME DISEASE ANTIBODY: CPT | Performed by: INTERNAL MEDICINE

## 2022-05-26 PROCEDURE — 250N000011 HC RX IP 250 OP 636: Performed by: INTERNAL MEDICINE

## 2022-05-26 PROCEDURE — 250N000011 HC RX IP 250 OP 636: Performed by: RADIOLOGY

## 2022-05-26 PROCEDURE — 99255 IP/OBS CONSLTJ NEW/EST HI 80: CPT | Performed by: INTERNAL MEDICINE

## 2022-05-26 PROCEDURE — 82945 GLUCOSE OTHER FLUID: CPT | Performed by: INTERNAL MEDICINE

## 2022-05-26 PROCEDURE — 96361 HYDRATE IV INFUSION ADD-ON: CPT

## 2022-05-26 PROCEDURE — 96375 TX/PRO/DX INJ NEW DRUG ADDON: CPT

## 2022-05-26 PROCEDURE — 250N000013 HC RX MED GY IP 250 OP 250 PS 637: Performed by: INTERNAL MEDICINE

## 2022-05-26 PROCEDURE — 82164 ANGIOTENSIN I ENZYME TEST: CPT | Performed by: PSYCHIATRY & NEUROLOGY

## 2022-05-26 PROCEDURE — 36415 COLL VENOUS BLD VENIPUNCTURE: CPT | Performed by: INTERNAL MEDICINE

## 2022-05-26 PROCEDURE — 250N000013 HC RX MED GY IP 250 OP 250 PS 637: Performed by: PSYCHIATRY & NEUROLOGY

## 2022-05-26 PROCEDURE — 258N000003 HC RX IP 258 OP 636: Performed by: PSYCHIATRY & NEUROLOGY

## 2022-05-26 PROCEDURE — 200N000001 HC R&B ICU

## 2022-05-26 PROCEDURE — 87529 HSV DNA AMP PROBE: CPT | Performed by: INTERNAL MEDICINE

## 2022-05-26 PROCEDURE — 99152 MOD SED SAME PHYS/QHP 5/>YRS: CPT

## 2022-05-26 PROCEDURE — 87075 CULTR BACTERIA EXCEPT BLOOD: CPT | Performed by: INTERNAL MEDICINE

## 2022-05-26 PROCEDURE — 85025 COMPLETE CBC W/AUTO DIFF WBC: CPT | Performed by: INTERNAL MEDICINE

## 2022-05-26 PROCEDURE — 87798 DETECT AGENT NOS DNA AMP: CPT | Performed by: INTERNAL MEDICINE

## 2022-05-26 PROCEDURE — 96376 TX/PRO/DX INJ SAME DRUG ADON: CPT

## 2022-05-26 PROCEDURE — 99153 MOD SED SAME PHYS/QHP EA: CPT

## 2022-05-26 PROCEDURE — 89050 BODY FLUID CELL COUNT: CPT | Performed by: INTERNAL MEDICINE

## 2022-05-26 PROCEDURE — 99223 1ST HOSP IP/OBS HIGH 75: CPT | Performed by: PSYCHIATRY & NEUROLOGY

## 2022-05-26 PROCEDURE — 250N000013 HC RX MED GY IP 250 OP 250 PS 637: Performed by: STUDENT IN AN ORGANIZED HEALTH CARE EDUCATION/TRAINING PROGRAM

## 2022-05-26 RX ORDER — NALOXONE HYDROCHLORIDE 0.4 MG/ML
0.2 INJECTION, SOLUTION INTRAMUSCULAR; INTRAVENOUS; SUBCUTANEOUS
Status: DISCONTINUED | OUTPATIENT
Start: 2022-05-26 | End: 2022-05-27 | Stop reason: HOSPADM

## 2022-05-26 RX ORDER — NALOXONE HYDROCHLORIDE 0.4 MG/ML
0.4 INJECTION, SOLUTION INTRAMUSCULAR; INTRAVENOUS; SUBCUTANEOUS
Status: DISCONTINUED | OUTPATIENT
Start: 2022-05-26 | End: 2022-05-27 | Stop reason: HOSPADM

## 2022-05-26 RX ORDER — FLUMAZENIL 0.1 MG/ML
0.2 INJECTION, SOLUTION INTRAVENOUS
Status: DISCONTINUED | OUTPATIENT
Start: 2022-05-26 | End: 2022-05-27 | Stop reason: HOSPADM

## 2022-05-26 RX ORDER — FENTANYL CITRATE 50 UG/ML
25-50 INJECTION, SOLUTION INTRAMUSCULAR; INTRAVENOUS EVERY 5 MIN PRN
Status: DISCONTINUED | OUTPATIENT
Start: 2022-05-26 | End: 2022-05-27 | Stop reason: HOSPADM

## 2022-05-26 RX ORDER — ACETAZOLAMIDE 500 MG/1
500 CAPSULE, EXTENDED RELEASE ORAL EVERY 12 HOURS SCHEDULED
Status: DISCONTINUED | OUTPATIENT
Start: 2022-05-26 | End: 2022-05-27 | Stop reason: HOSPADM

## 2022-05-26 RX ORDER — DOXYCYCLINE 100 MG/1
100 CAPSULE ORAL EVERY 12 HOURS SCHEDULED
Status: DISCONTINUED | OUTPATIENT
Start: 2022-05-26 | End: 2022-05-27

## 2022-05-26 RX ORDER — CALCIUM CARBONATE 500 MG/1
500 TABLET, CHEWABLE ORAL DAILY PRN
Status: DISCONTINUED | OUTPATIENT
Start: 2022-05-26 | End: 2022-05-27 | Stop reason: HOSPADM

## 2022-05-26 RX ADMIN — VANCOMYCIN HYDROCHLORIDE 1750 MG: 5 INJECTION, POWDER, LYOPHILIZED, FOR SOLUTION INTRAVENOUS at 23:04

## 2022-05-26 RX ADMIN — ACYCLOVIR SODIUM 800 MG: 50 INJECTION, SOLUTION INTRAVENOUS at 16:56

## 2022-05-26 RX ADMIN — CEFTRIAXONE SODIUM 2 G: 2 INJECTION, POWDER, FOR SOLUTION INTRAMUSCULAR; INTRAVENOUS at 22:13

## 2022-05-26 RX ADMIN — MIDAZOLAM HYDROCHLORIDE 0.5 MG: 1 INJECTION, SOLUTION INTRAMUSCULAR; INTRAVENOUS at 10:07

## 2022-05-26 RX ADMIN — VANCOMYCIN HYDROCHLORIDE 1750 MG: 5 INJECTION, POWDER, LYOPHILIZED, FOR SOLUTION INTRAVENOUS at 12:21

## 2022-05-26 RX ADMIN — DOXYCYCLINE 100 MG: 100 CAPSULE ORAL at 11:11

## 2022-05-26 RX ADMIN — FENTANYL CITRATE 25 MCG: 50 INJECTION INTRAMUSCULAR; INTRAVENOUS at 10:09

## 2022-05-26 RX ADMIN — CEFTRIAXONE SODIUM 2 G: 2 INJECTION, POWDER, FOR SOLUTION INTRAMUSCULAR; INTRAVENOUS at 11:31

## 2022-05-26 RX ADMIN — ACYCLOVIR SODIUM 800 MG: 50 INJECTION, SOLUTION INTRAVENOUS at 00:03

## 2022-05-26 RX ADMIN — AMPICILLIN SODIUM 2 G: 2 INJECTION, POWDER, FOR SOLUTION INTRAMUSCULAR; INTRAVENOUS at 22:40

## 2022-05-26 RX ADMIN — VANCOMYCIN HYDROCHLORIDE 1750 MG: 5 INJECTION, POWDER, LYOPHILIZED, FOR SOLUTION INTRAVENOUS at 00:26

## 2022-05-26 RX ADMIN — AMPICILLIN SODIUM 2 G: 2 INJECTION, POWDER, FOR SOLUTION INTRAMUSCULAR; INTRAVENOUS at 04:31

## 2022-05-26 RX ADMIN — ACETAZOLAMIDE 500 MG: 500 CAPSULE, EXTENDED RELEASE ORAL at 11:11

## 2022-05-26 RX ADMIN — AMPICILLIN SODIUM 2 G: 2 INJECTION, POWDER, FOR SOLUTION INTRAMUSCULAR; INTRAVENOUS at 19:35

## 2022-05-26 RX ADMIN — ACETAZOLAMIDE 500 MG: 500 CAPSULE, EXTENDED RELEASE ORAL at 19:40

## 2022-05-26 RX ADMIN — CALCIUM CARBONATE (ANTACID) CHEW TAB 500 MG 500 MG: 500 CHEW TAB at 23:45

## 2022-05-26 RX ADMIN — DEXAMETHASONE SODIUM PHOSPHATE 6 MG: 10 INJECTION, SOLUTION INTRAMUSCULAR; INTRAVENOUS at 04:31

## 2022-05-26 RX ADMIN — DOXYCYCLINE 100 MG: 100 CAPSULE ORAL at 19:40

## 2022-05-26 RX ADMIN — DOCUSATE SODIUM 50 MG AND SENNOSIDES 8.6 MG 1 TABLET: 8.6; 5 TABLET, FILM COATED ORAL at 19:40

## 2022-05-26 RX ADMIN — SODIUM CHLORIDE 250 MG: 9 INJECTION, SOLUTION INTRAVENOUS at 16:43

## 2022-05-26 RX ADMIN — SODIUM CHLORIDE 250 MG: 9 INJECTION, SOLUTION INTRAVENOUS at 22:10

## 2022-05-26 RX ADMIN — VALSARTAN 80 MG: 80 TABLET, FILM COATED ORAL at 11:11

## 2022-05-26 RX ADMIN — ACETAMINOPHEN 650 MG: 325 TABLET, FILM COATED ORAL at 12:38

## 2022-05-26 RX ADMIN — SODIUM CHLORIDE, PRESERVATIVE FREE: 5 INJECTION INTRAVENOUS at 21:07

## 2022-05-26 RX ADMIN — HYDROCHLOROTHIAZIDE 25 MG: 25 TABLET ORAL at 11:11

## 2022-05-26 RX ADMIN — SODIUM CHLORIDE 250 MG: 9 INJECTION, SOLUTION INTRAVENOUS at 11:01

## 2022-05-26 RX ADMIN — AMPICILLIN SODIUM 2 G: 2 INJECTION, POWDER, FOR SOLUTION INTRAMUSCULAR; INTRAVENOUS at 14:26

## 2022-05-26 RX ADMIN — ACYCLOVIR SODIUM 800 MG: 50 INJECTION, SOLUTION INTRAVENOUS at 23:46

## 2022-05-26 RX ADMIN — AMPICILLIN SODIUM 2 G: 2 INJECTION, POWDER, FOR SOLUTION INTRAMUSCULAR; INTRAVENOUS at 07:52

## 2022-05-26 RX ADMIN — SODIUM CHLORIDE, PRESERVATIVE FREE: 5 INJECTION INTRAVENOUS at 11:01

## 2022-05-26 RX ADMIN — AMPICILLIN SODIUM 2 G: 2 INJECTION, POWDER, FOR SOLUTION INTRAMUSCULAR; INTRAVENOUS at 11:15

## 2022-05-26 RX ADMIN — ACYCLOVIR SODIUM 800 MG: 50 INJECTION, SOLUTION INTRAVENOUS at 07:53

## 2022-05-26 ASSESSMENT — ACTIVITIES OF DAILY LIVING (ADL)
ADLS_ACUITY_SCORE: 35
DEPENDENT_IADLS:: INDEPENDENT
ADLS_ACUITY_SCORE: 35

## 2022-05-26 NOTE — PROGRESS NOTES
Pt states that she does have sleep apnea but she can't use CPAP d/t a significant allergic reaction to the mask, she has also tried dental appliances but they didn't work satisfactory, she uses pillows at home to position herself to minimize her sleep apnea symptoms. Text message sent to ordering MD

## 2022-05-26 NOTE — CONSULTS
ID Note:  5/26/2022 10:06 AM    Attempted to see patient in ED. Patient at procedure (LP). Chart reviewed. Will attempt to see patient post procedure.   On broad spectrum antibiotics

## 2022-05-26 NOTE — CONSULTS
Care Management Initial Consult    General Information  Assessment completed with: Patient,    Type of CM/SW Visit: Initial Assessment    Primary Care Provider verified and updated as needed: Yes ([facility])   Readmission within the last 30 days: no previous admission in last 30 days         Advance Care Planning: Advance Care Planning Reviewed:  (HCD forms provided)          Communication Assessment  Patient's communication style: spoken language (English or Bilingual)             Cognitive  Cognitive/Neuro/Behavioral: WDL  Level of Consciousness: alert  Arousal Level: opens eyes spontaneously  Orientation: oriented x 4     Best Language: 0 - No aphasia  Speech: spontaneous, logical    Living Environment:   People in home: parent(s)     Current living Arrangements: house      Able to return to prior arrangements: yes       Family/Social Support:  Care provided by: self  Provides care for: parent(s) (fairly independent but occ assist needed)     Parent(s)          Description of Support System: Supportive, Involved         Current Resources:   Patient receiving home care services: No     Community Resources: None  Equipment currently used at home: none  Supplies currently used at home: None    Employment/Financial:  Employment Status: employed full-time        Financial Concerns:             Lifestyle & Psychosocial Needs:  Social Determinants of Health     Tobacco Use: High Risk     Smoking Tobacco Use: Current Every Day Smoker     Smokeless Tobacco Use: Never Used   Alcohol Use: Not on file   Financial Resource Strain: Not on file   Food Insecurity: Not on file   Transportation Needs: Not on file   Physical Activity: Not on file   Stress: Not on file   Social Connections: Not on file   Intimate Partner Violence: Not on file   Depression: Not on file   Housing Stability: Not on file       Functional Status:  Prior to admission patient needed assistance:   Dependent ADLs:: Independent  Dependent IADLs::  Independent               Additional Information:    Assessment completed with patient. Her mom was present. Patient states she is independent with ADLs and IADLs. She lives in her house with her dad. He is mostly independent but needs occasional assist. She ambulates without devices, drives and works full time. Her stated discharge goal is to return home. Mom will transport.        Jacqueline Mejia RN

## 2022-05-26 NOTE — SEDATION DOCUMENTATION
"Pt declining additional IV sedation, states \"no no no, I'm fine, don't give me any more\".  Pt tolerating procedure well, relaxed and still.  Dr Powell aware and agrees with holding second doses at this time.   "

## 2022-05-26 NOTE — PROGRESS NOTES
Hospitalist Progress Note  ADMIT DATE: 5/25/2022     FACILITY: [facility]    PCP: Shaq, Thea Rogel, [phone]    Assessment/Plan    Parul Veliz is a 61 year old female admitted on 5/25/2022. She has been having around moderate intensity headache since about a week. Since  5/24 around 1200 she started to have vision changes - and has not been able to see the part of the finger with either eye ( when one eye is closed ) but can do finger counting. No focal weakness, confusion, speech changes, facial droop. She also spiked a fever of 102 in ER. She had been in the [facility] from last Wed to Sunday. MRI of the brain : Nonspecific scattered small foci of T2 FLAIR hyperintensity in the cerebral white matter can be seen in setting of chronic small vessel ischemic changes, prior trauma or insult, or migraines. Demyelination is not favored. Severe stenosis of the distal transverse sinuses bilaterally, which is nonspecific but can be seen in setting of idiopathic intracranial hypertension. Neurology consulted and concern for meningitis and recommend LP 5/26 ( not needed urgently ). Blood cultures sent, started empiric iv ampicillin, vancomycin, acyclovir, ceftriaxone.       A/p :         Fever  + Acute onset headache + Vision deficits     She has been having around moderate intensity headache since about a week.      Since 5/24 around 1200 she started to have vision changes - and has not been able to see the part of the finger with either eye ( when one eye is closed ) but can do finger counting.      No focal weakness, confusion, speech changes, facial droop.      She also spiked a fever of 102 in ER.      She had been in the [facility] from last Wed to Sunday.      MRI of the brain : Nonspecific scattered small foci of T2 FLAIR hyperintensity in the cerebral white matter can be seen in setting of chronic small vessel ischemic changes,  prior trauma or insult, or migraines. Demyelination is not favored. Severe stenosis of the distal transverse sinuses bilaterally, which is nonspecific but can be seen in setting of idiopathic intracranial hypertension.      Neurology consulted and concern for meningitis/Benign intracranial hypertension and recommend LP and drianing. Done 5/26. Blood cultures sent, started empiric iv ampicillin, vancomycin, acyclovir, ceftriaxone. CSF results pending.   As per neurology:    Lumbar puncture under fluoroscopy to measure opening pressure.  Send CSF for routine studies, meningitis/encephalitis panel.  As a temporizing measure I would recommend lumbar drain.  I have discussed case with IR    Start Diamox 500 mg twice daily    IV methylprednisolone 250 mg every 6 hours x5 days followed by tapering dose of prednisone    Check vitamin A level, angiotensin-converting enzyme    Patient will need either ventriculoperitoneal shunt or optic nerve fenestration.  This will require transfer to [facility].          Asthma, exercise induced : on albuterol prn       HTN, Essential : on valsartan 80 mg daily       H/o PE post hysterectomy - 2008 : on baby aspirin       Breast cancer b/l - -2014, s/p mastectomy b/l         Diet: Regular Diet Adult    DVT Prophylaxis: Pneumatic Compression Devices  Rodriguez Catheter: Not present  Central Lines: None  Cardiac Monitoring: None  Code Status:  full    Barriers to Discharge:  S/p LP. On CSF draining    Anticipated discharge date/Disposition: 2-3 days    Subjective  Headache gone after LP. Now c/o back pain. No current fever/chills.    Objective    Vital signs in last 24 hours  Temp:  [97.6  F (36.4  C)-102.6  F (39.2  C)] 100.2  F (37.9  C)  Pulse:  [] 71  Resp:  [16-19] 16  BP: (123-185)/(66-94) 123/66  SpO2:  [92 %-99 %] 92 % [unfilled] O2 Device: None (Room air)    Weight:   [unfilled] Weight change:     Intake/Output last 3 shifts  No intake/output data recorded.  Body  mass index is 40.72 kg/m .    Physical Exam    Physical Exam  General appearance: not in acute distress. Obese.   HEENT: PERRL, EOMI  Lungs: Clear breath sounds in bilateral lung fields  Cardiovascular: Regular rate and rhythm, normal S1-S2  Abdomen: Soft, non tender, no distension, normal bowel sound  Musculoskeletal: No joint swelling  Skin: No rash and no edema  Neurology: grossly intact      Pertinent Labs   Lab Results: personally reviewed.    Latest Reference Range & Units 05/26/22 08:32   Sodium 136 - 145 mmol/L 138   Potassium 3.5 - 5.0 mmol/L 3.7   Chloride 98 - 107 mmol/L 106   Carbon Dioxide 22 - 31 mmol/L 24   Urea Nitrogen 8 - 22 mg/dL 11   Creatinine 0.60 - 1.10 mg/dL 0.69   GFR Estimate >60 mL/min/1.73m2 >90 [1]   Calcium 8.5 - 10.5 mg/dL 8.9   Anion Gap 5 - 18 mmol/L 8   CRP 0.0 - 0.8 mg/dL 7.1 (H)   Procalcitonin 0.00 - 0.49 ng/mL 0.02 [2]   Glucose 70 - 125 mg/dL 140 (H)   WBC 4.0 - 11.0 10e3/uL 5.6   Hemoglobin 11.7 - 15.7 g/dL 12.5   Hematocrit 35.0 - 47.0 % 36.3   Platelet Count 150 - 450 10e3/uL 397   RBC Count 3.80 - 5.20 10e6/uL 4.15   MCV 78 - 100 fL 88   MCH 26.5 - 33.0 pg 30.1   MCHC 31.5 - 36.5 g/dL 34.4   RDW 10.0 - 15.0 % 11.8   % Neutrophils % 83   % Lymphocytes % 14   % Monocytes % 2   (H): Data is abnormally high  [1] Effective December 21, 2021 eGFRcr in adults is calculated using the 2021 CKD-EPI creatinine equation which includes age and gender (Tonya et al., NEJ, DOI: 10.1056/VQDJsw8376442)  [2] Normal Procalcitonin in healthy populations: <=0.07  ng/ml       Procalcitonin Interpretation Guidelines:    Diagnosis of systemic bacterial infection/sepsis/septic shock:        <0.5 ng/mL:           Low risk of sepsis; localized bacterial infection possible        >=0.5 to <=2.0 ng/mL: Sepsis possible. Interpret in context of the specific clinical background and condition of the patient. Retest PCL within 6-24 hours.        >2.0 ng.mL:           High risk of sepsis and/or septic shock.            Antibiotic therapy may be discontinued if the current PCL is <=0.5 ng/mL or the Change in PCL (using highest PCL this admission and current PCL) is >80%. Do PCL testing on patients being treated with antibiotics for sepsis every 1-2 days.       Diagnosis of lower respiratory tract infection(LRTI) and decision making on antibiotic therapy:       <0.1 ng/ml:            Bacterial infection very unlikely. Antibiotic therapy strongly discouraged.       0.1 to 0.25 ng/mL :    Bacterial infection unlikely. Antibiotic therapy discouraged.       0.26 to 0.5 ng/ml:     Bacterial infection likely. Antibiotic therapy encouraged.       >0.5 ng/mL:            Bacterial infection very likely. Antibiotic therapy strongly encouraged.                     If antibiotics are not started for suspected LRTI, repeat PCL within 6-24 hours if symptoms persist or worsen.        Antibiotic therapy for LRTI may be discontinued if the PCL is <=0.25 ng/ml or the Change in PCL is >80%        Do PCL testing on patients being treated with antibiotics for LRTI every 1-2 days.       Decisions regarding antibiotic therapy should not be based solely on PCL concentrations. PCL results should be used in conjunction with the laboratory findings and clinical signs and be interpreted in the context of the patient's clinical situation.    Medications  Scheduled Meds:    acyclovir (ZOVIRAX) IV  10 mg/kg (Adjusted) Intravenous Q8H     ampicillin  2 g Intravenous Q4H     cefTRIAXone  2 g Intravenous Q12H     dexamethasone  6 mg Intravenous Q6H     hydrochlorothiazide  25 mg Oral Daily     metoclopramide  10 mg Intravenous Once     senna-docusate  1 tablet Oral BID    Or     senna-docusate  2 tablet Oral BID     sodium chloride (PF)  3 mL Intracatheter Q8H     valsartan  80 mg Oral Daily     vancomycin  1,750 mg Intravenous Q12H     Continuous Infusions:    sodium chloride 100 mL/hr at 05/26/22 0600     PRN Meds:.acetaminophen **OR** acetaminophen,  albuterol, hydrALAZINE, lidocaine 4%, lidocaine (buffered or not buffered), melatonin, ondansetron **OR** ondansetron, oxyCODONE, sodium chloride (PF)    Pertinent Radiology   Radiology Results personally reviewed      Advanced Care Planning:  Discharge planning discussed with patient/family        [facility] Medicine Service  Cesario Arriola

## 2022-05-26 NOTE — H&P
Assessment         Parul Veliz is a 61 year old female admitted on 5/25/2022. She has been having around moderate intensity headache since about a week. Since  yesterday around 1200 she started to have vision changes - and has not been able to see the part of the finger with either eye but can do finger counting. No focal weakness, confusion, speech changes, facial droop. She also spiked a fever of 102 in ER. She had been in the [facility] from wed to Sunday. MRI of the brain : Nonspecific scattered small foci of T2 FLAIR hyperintensity in the cerebral white matter can be seen in setting of chronic small vessel ischemic changes, prior trauma or insult, or migraines. Demyelination is not favored. Severe stenosis of the distal transverse sinuses bilaterally, which is nonspecific but can be seen in setting of idiopathic intracranial hypertension. Neurology consulted and concern for meningitis and recommend LP in am ( not needed urgently ). Blood cultures sent, started empiric iv ampicillin, vancomycin, acyclovir, ceftriaxone.           A/p :            Fever     Acute onset headache + vision changes + fever    Asthma, exercise induced : on albuterol prn    HTN, Essential : on valsartan 80 mg daily    H/o PE post hysterectomy - 2008 : on baby aspirin    Breast cancer b/l - -2014, s/p mastectomy b/l          Physical Exam:          HEENT : no distended veins, no lymphadenopathy, thyroid is normal  LUNGS : b/l air entry present, no significant crackles/wheezing.  ABDOMEN : soft, non-tender, non-distended, BS present.  HEART :  Regular rate & rhythm, S1 & S2 normal, no murmur, clicks/rubs, no ankle edema,  NEURO : conscious, oriented, responds to commands, vision deficit  MUSCULOSKELETAL : EXTREMITIES :  no calf tenderness.  SKIN : no rashes  BACK : wnl          DETAILED H & P is Pending at this time

## 2022-05-26 NOTE — UTILIZATION REVIEW
Admission Status; Secondary Review Determination       Under the authority of the Utilization Management Committee, the utilization review process indicated a secondary review on the above patient. The review outcome is based on review of the medical records, discussions with staff, and applying clinical experience noted on the date of the review.     (x) Inpatient Status Appropriate - This patient's medical care is consistent with medical management for inpatient care and reasonable inpatient medical practice.     RATIONALE FOR DETERMINATION     Ms. Veliz is a 60 yo female with a PMH of obesity, RACHID and depression who presents to the ED with severe fevers and headache.  CT of the head.  Neuro and ID consulted d/t concern for acute meningitis/encephalitis.  She underwent LP and remains on IV acyclolvir, IV ampicillin, IV rocephin and oral doxycycline.  Neuro is clinically concerned about benign intracranial HTN and has started her on high-dose IV solumedrol q6hr and scheduled diamox.  She is currently NPO for medical procedures today.  She is requiring ongoing inpatient medical treatment and monitoring.    At the time of admission with the information available to the attending physician more than 2 nights Hospital complex care was anticipated, based on patient risk of adverse outcome if treated as outpatient and complex care required. Inpatient admission is appropriate based on the Medicare guidelines.     The information on this document is developed by the utilization review team in order for the business office to ensure compliance. This only denotes the appropriateness of proper admission status and does not reflect the quality of care rendered.   The definitions of Inpatient Status and Observation Status used in making the determination above are those provided in the CMS Coverage Manual, Chapter 1 and Chapter 6, section 70.4.         Sincerely,     Noemy Laurent, DO  Utilization Review  Physician  Advisor  [facility].

## 2022-05-26 NOTE — H&P
[facility]    History and Physical - Hospitalist Service       Date of Admission:  5/25/2022    Assessment & Plan             Parul Veliz is a 61 year old female admitted on 5/25/2022. She has been having around moderate intensity headache since about a week. Since  yesterday around 1200 she started to have vision changes - and has not been able to see the part of the finger with either eye ( when one eye is closed ) but can do finger counting. No focal weakness, confusion, speech changes, facial droop. She also spiked a fever of 102 in ER. She had been in the [facility] from last Wed to Sunday. MRI of the brain : Nonspecific scattered small foci of T2 FLAIR hyperintensity in the cerebral white matter can be seen in setting of chronic small vessel ischemic changes, prior trauma or insult, or migraines. Demyelination is not favored. Severe stenosis of the distal transverse sinuses bilaterally, which is nonspecific but can be seen in setting of idiopathic intracranial hypertension. Neurology consulted and concern for meningitis and recommend LP in am ( not needed urgently ). Blood cultures sent, started empiric iv ampicillin, vancomycin, acyclovir, ceftriaxone.               A/p :              Fever  + Acute onset headache + Vision deficits    She has been having around moderate intensity headache since about a week.     Since  yesterday around 1200 she started to have vision changes - and has not been able to see the part of the finger with either eye ( when one eye is closed ) but can do finger counting.     No focal weakness, confusion, speech changes, facial droop.     She also spiked a fever of 102 in ER.     She had been in the [facility] from last Wed to Sunday.     MRI of the brain : Nonspecific scattered small foci of T2 FLAIR hyperintensity in the cerebral white matter can be seen in setting of chronic small vessel ischemic changes,  "prior trauma or insult, or migraines. Demyelination is not favored. Severe stenosis of the distal transverse sinuses bilaterally, which is nonspecific but can be seen in setting of idiopathic intracranial hypertension.     Neurology consulted and concern for meningitis and recommend LP in am ( not needed urgently ). Blood cultures sent, started empiric iv ampicillin, vancomycin, acyclovir, ceftriaxone.          Asthma, exercise induced : on albuterol prn      HTN, Essential : on valsartan 80 mg daily      H/o PE post hysterectomy - 2008 : on baby aspirin      Breast cancer b/l - -2014, s/p mastectomy b/l               Diet: Regular Diet Adult    DVT Prophylaxis: Pneumatic Compression Devices  Rodriguez Catheter: Not present  Central Lines: None  Cardiac Monitoring: None  Code Status:  full    Clinically Significant Risk Factors Present on Admission                # Platelet Defect: home medication list includes an antiplatelet medication   # Severe Obesity: Estimated body mass index is 40.72 kg/m  as calculated from the following:    Height as of this encounter: 1.702 m (5' 7\").    Weight as of this encounter: 117.9 kg (260 lb).      Disposition Plan   Expected Discharge: ? 2 days  Anticipated discharge location:  Home  Delays: management of headache/vision changes     The patient's care was discussed with the Bedside Nurse and Patient.    Héctor Son MD  Hospitalist Service  [facility]  Securely message with the Vocera Web Console (learn more here)  Text page via [employer] Paging/Directory         ______________________________________________________________________    Chief Complaint   headache    History is obtained from the patient    History of Present Illness     Parul Veliz is a 61 year old female admitted on 5/25/2022. She has been having around moderate intensity headache since about a week. Since  yesterday around 1200 she started to have vision changes - and has not been able to " see the part of the finger with either eye but can do finger counting. No focal weakness, confusion, speech changes, facial droop. She also spiked a fever of 102 in ER. She had been in the [facility] from wed to Sunday. MRI of the brain : Nonspecific scattered small foci of T2 FLAIR hyperintensity in the cerebral white matter can be seen in setting of chronic small vessel ischemic changes, prior trauma or insult, or migraines. Demyelination is not favored. Severe stenosis of the distal transverse sinuses bilaterally, which is nonspecific but can be seen in setting of idiopathic intracranial hypertension. Neurology consulted and concern for meningitis and recommend LP in am ( not needed urgently ). Blood cultures sent, started empiric iv ampicillin, vancomycin, acyclovir, ceftriaxone.      Review of Systems      No cp  No abdominal symptoms  No urinary symptoms  No focal weakness    Past Medical History    I have reviewed this patient's medical history and updated it with pertinent information if needed.   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  Past Surgical History:   Procedure Laterality Date     CHOLECYSTECTOMY, LAPOROSCOPIC  6/2011    Cholecystectomy, Laparoscopic     COLONOSCOPY  2011     HYSTERECTOMY, SANDRA  2/08    radical hysterectomy, BSO, hx of cx ca       Social History   I have reviewed this patient's social history and updated it  with pertinent information if needed.  Social History     Tobacco Use     Smoking status: Current Every Day Smoker     Packs/day: 1.00     Years: 20.00     Pack years: 20.00     Types: Cigarettes     Smokeless tobacco: Never Used     Tobacco comment: 1 pp week   Substance Use Topics     Alcohol use: Yes     Comment: 1-2/week     Drug use: No     Lives in [place] in a house  Single  No children  Smokes 1 PPD, rarely alcohol, no drugs  Working : yes, [employer] tax    Family History   I have reviewed this patient's family history and updated it with pertinent information if needed.  Family History   Problem Relation Age of Onset     Cerebrovascular Disease Mother      Breast Cancer Maternal Aunt      Breast Cancer Paternal Aunt        Prior to Admission Medications   Prior to Admission Medications   Prescriptions Last Dose Informant Patient Reported? Taking?   albuterol (PROAIR HFA/PROVENTIL HFA/VENTOLIN HFA) 108 (90 Base) MCG/ACT inhaler Unknown  Yes Yes   Sig: Inhale 2 puffs into the lungs every 6 hours as needed for shortness of breath / dyspnea or wheezing   aspirin 81 MG EC tablet 5/25/2022  Yes Yes   Sig: Take 81 mg by mouth daily   cetirizine (ZYRTEC) 10 MG tablet 5/25/2022  Yes Yes   Sig: Take 10 mg by mouth daily   hydrochlorothiazide (HYDRODIURIL) 25 MG tablet 5/25/2022  Yes Yes   Sig: Take 25 mg by mouth daily   valsartan (DIOVAN) 80 MG tablet 5/25/2022  No Yes   Sig: Take 1 tablet by mouth daily.      Facility-Administered Medications: None     Allergies   Allergies   Allergen Reactions     Pcn [Penicillins]        Physical Exam   Vital Signs: Temp: 100.1  F (37.8  C) Temp src: Oral BP: (!) 143/71 Pulse: 99   Resp: 16 SpO2: 94 % O2 Device: None (Room air)    Weight: 260 lbs 0 oz       GENERAL: The patient is not in any acute distressed. Awake and alert.  HEENT: Nonicteric sclerae, PERRLA, EOMI. Oropharynx clear. Moist mucous membranes. Conjunctivae appear well perfused.  HEART: Regular rate and  rhythm without murmurs.  LUNGS: Clear to auscultation bilaterally. No wheezing or crackles.  ABDOMEN: Soft, positive bowel sounds, nontender.  SKIN: No rash, no excessive bruising, petechiae, or purpura.  EXTREMITIES : no rashes, no swelling in legs.  NEUROLOGIC: conscious and oriented, follows commands  ROS: All other systems negative       Data   Data reviewed today: I reviewed all medications, new labs and imaging results over the last 24 hours. I personally reviewed no images or EKG's today.    Recent Labs   Lab 05/25/22  1408   WBC 9.6   HGB 14.8   MCV 90      INR 1.05      POTASSIUM 3.5   CHLORIDE 99   CO2 27   BUN 12   CR 0.80   ANIONGAP 11   JOE 10.0      ALBUMIN 3.4*   PROTTOTAL 7.9   BILITOTAL 0.4   ALKPHOS 156*   ALT 20   AST 15

## 2022-05-26 NOTE — SEDATION DOCUMENTATION
Pt talkative, comfortable, tolerating procedure without distress or discomfort.  No HA, no new neuro changes or deficits.

## 2022-05-26 NOTE — CONSULTS
NEUROLOGY INPATIENT CONSULTATION NOTE       [facility]  [address] [place] [address]  Tel: [phone]  Fax: [phone]  [email or web address]     Parul Veliz,  1960, MRN [id number]  PCP: Thea New  Date: 2022     ASSESSMENT & PLAN     Diagnosis code: Benign intracranial hypertension    Benign intracranial hypertension  61-year-old morbidly obese female with history of HTN, breast cancer s/p mastectomy, depression, RACHID who was admitted with progressively worsening headache with visual symptoms.  Patient's history and exam suggest the diagnosis of benign intracranial hypertension.    Lumbar puncture under fluoroscopy to measure opening pressure.  Send CSF for routine studies, meningitis/encephalitis panel.  As a temporizing measure I would recommend lumbar drain.  I have discussed case with IR    Start Diamox 500 mg twice daily    IV methylprednisolone 250 mg every 6 hours x5 days followed by tapering dose of prednisone    Check vitamin A level, angiotensin-converting enzyme    Patient will need either ventriculoperitoneal shunt or optic nerve fenestration.  This will require transfer to [facility].    Thank you again for this referral, please feel free to contact me if you have any questions.    Devante Joseph MD  [facility]  (Formerly, [facility]     CHIEF COMPLAINT Benign intracranial hypertension     HISTORY OF PRESENT ILLNESS     We have been requested by Dr. Son to evaluate Parul Veliz who is a 61 year old  female for benign intracranial hypertension    Patient is 61-year-old female with history of HTN, breast cancer status post mastectomy, depression, RACHID who was evaluated for progressively worsening headache that started about a week ago.  She started having some visual symptoms yesterday that she describes as black spot in the medial aspect of  her vision in both eyes.  Patient attributed her headaches to stress at work and took some time off to [place] constant but headaches persisted.  She had a temperature yesterday for a short duration and it came down on its own.  She does report she has a long history of headaches but recently her headache pattern change.  Previously she used to have bifrontal headache that she attributed to her stress but recent headaches more are in the back of her neck.  Due to her visual symptoms she got concerned and came to the emergency room.  She had MRI of the brain that showed nonspecific scattered white matter changes.  MRI was negative but MRV showed severe stenosis of distal transverse sinus bilaterally which is nonspecific but can be seen in the setting of idiopathic intracranial hypertension.       PROBLEM LIST      Patient Active Problem List   Diagnosis Code     Pulmonary embolism and infarction (H) I26.99     HTN (hypertension) I10     Seasonal allergies J30.2     CARDIOVASCULAR SCREENING; LDL GOAL LESS THAN 130 Z13.6     Obesity E66.9     Smoker F17.200     Health Care Home Z76.89     Infiltrating ductal carcinoma of LEFT breast, BX 10/9/12 C50.919     Allergic rhinitis J30.9     Asthma J45.909     Cervical cancer (H) C53.9     Depressive disorder F32.9     History of breast cancer in female Z85.3     History of pulmonary embolus (PE) Z86.711     Hypercoagulable state (H) D68.59     Malignant neoplasm of female breast (H) C50.919     RACHID (obstructive sleep apnea) G47.33     Osteopenia M85.80     Polyp of colon K63.5     Pulmonary embolism (H) I26.99     S/P laparoscopic cholecystectomy Z90.49     S/P SANDRA (total abdominal hysterectomy) Z90.710     Vitamin D deficiency E55.9     Headache on top of head R51.9     Benign intracranial hypertension G93.2      Clinically Significant Risk Factors Present on Admission               # Platelet Defect: home medication list includes an antiplatelet medication    # Cerebral  edema         PAST MEDICAL & SURGICAL HISTORY     Past Medical History: Patient  has a past medical history of Asthma, mild intermittent, cervical cancer (2/2008), Genetic predisposition to breast cancer, Hemangioma of skin, HTN (hypertension), Infiltrating ductal carcinoma of LEFT breast, BX 10/9/12 (10/15/2012), Lumbar disc disease, Nephrolithiasis, Obesity, RACHID (obstructive sleep apnea), PE (pulmonary embolism) (2008), and Pericardial cyst.    Past Surgical History: She  has a past surgical history that includes hysterectomy, desiree (2/08); Colonoscopy (2011); and cholecystectomy, laporoscopic (6/2011).     SOCIAL HISTORY     Reviewed, and she  reports that she has been smoking cigarettes. She has a 20.00 pack-year smoking history. She has never used smokeless tobacco. She reports current alcohol use. She reports that she does not use drugs.     FAMILY HISTORY     Reviewed, and family history includes Breast Cancer in her maternal aunt and paternal aunt; Cerebrovascular Disease in her mother.     ALLERGIES     Allergies   Allergen Reactions     Pcn [Penicillins]      Unknown, childhood rxn        REVIEW OF SYSTEMS     Pertinent items are noted in HPI.     HOME & HOSPITAL MEDICATIONS     Prior to Admission Medications  (Not in a hospital admission)      Hospital Medications    acyclovir (ZOVIRAX) IV  10 mg/kg (Adjusted) Intravenous Q8H     ampicillin  2 g Intravenous Q4H     cefTRIAXone  2 g Intravenous Q12H     dexamethasone  6 mg Intravenous Q6H     hydrochlorothiazide  25 mg Oral Daily     metoclopramide  10 mg Intravenous Once     senna-docusate  1 tablet Oral BID    Or     senna-docusate  2 tablet Oral BID     sodium chloride (PF)  3 mL Intracatheter Q8H     valsartan  80 mg Oral Daily     vancomycin  1,750 mg Intravenous Q12H        PHYSICAL EXAM     Vital signs  Temp:  [97.6  F (36.4  C)-102.6  F (39.2  C)] 100.2  F (37.9  C)  Pulse:  [] 68  Resp:  [16-19] 16  BP: (123-185)/(66-94) 123/66  SpO2:  [92  %-99 %] 94 %    Weight:   Wt Readings from Last 1 Encounters:   05/25/22 117.9 kg (260 lb)      General Physical Exam: Patient is alert and oriented x 3. Vital signs were reviewed and are documented in EMR. Neck was supple, no carotid bruit, thyromegaly, JVD or lymphadenopathy noted.  Neurological Exam:  Mental status: Alert and oriented x3 no acute distress  Speech: Normal with no dysarthria or aphasia  Cranial nerves: Patient has bilateral papilledema rest of the cranial nerves are intact  Motor: Normal mass and tone with 5/5 strength  Reflexes: 1+ with downgoing toes  Sensory: Intact light touch pinprick  Coordination: Intact finger-nose testing heel-knee-shin  Gait: Deferred for safety     DIAGNOSTIC STUDIES     Pertinent Radiology   Radiology Results: Reviewed impression and images     MRI  HEAD MRI:   1.  No acute intracranial abnormality  2.  Nonspecific scattered small foci of T2 FLAIR hyperintensity in the cerebral white matter can be seen in setting of chronic small vessel ischemic changes, prior trauma or insult, or migraines. Demyelination is not favored.     HEAD MRA:   1.  Normal MRA [place].     NECK MRA:  1.  Normal neck MRA.     HEAD MRV:  1.  Severe stenosis of the distal transverse sinuses bilaterally, which is nonspecific but can be seen in setting of idiopathic intracranial hypertension.  2.  No additional cervical stenosis or occlusion. No thrombosis.    Pertinent Labs   Lab Results: Personally Reviewed   Recent Results (from the past 24 hour(s))   INR    Collection Time: 05/25/22  2:08 PM   Result Value Ref Range    INR 1.05 0.85 - 1.15   Magnesium    Collection Time: 05/25/22  2:08 PM   Result Value Ref Range    Magnesium 2.0 1.8 - 2.6 mg/dL   TSH with free T4 reflex    Collection Time: 05/25/22  2:08 PM   Result Value Ref Range    TSH 1.73 0.30 - 5.00 uIU/mL   Basic metabolic panel    Collection Time: 05/25/22  2:08 PM   Result Value Ref Range    Sodium 137 136 - 145 mmol/L     Potassium 3.5 3.5 - 5.0 mmol/L    Chloride 99 98 - 107 mmol/L    Carbon Dioxide (CO2) 27 22 - 31 mmol/L    Anion Gap 11 5 - 18 mmol/L    Urea Nitrogen 12 8 - 22 mg/dL    Creatinine 0.80 0.60 - 1.10 mg/dL    Calcium 10.0 8.5 - 10.5 mg/dL    Glucose 104 70 - 125 mg/dL    GFR Estimate 84 >60 mL/min/1.73m2   Hepatic function panel    Collection Time: 05/25/22  2:08 PM   Result Value Ref Range    Bilirubin Total 0.4 0.0 - 1.0 mg/dL    Bilirubin Direct 0.2 <=0.5 mg/dL    Protein Total 7.9 6.0 - 8.0 g/dL    Albumin 3.4 (L) 3.5 - 5.0 g/dL    Alkaline Phosphatase 156 (H) 45 - 120 U/L    AST 15 0 - 40 U/L    ALT 20 0 - 45 U/L   CRP inflammation    Collection Time: 05/25/22  2:08 PM   Result Value Ref Range    CRP 8.1 (H) 0.0 - 0.8 mg/dL   CBC with platelets and differential    Collection Time: 05/25/22  2:08 PM   Result Value Ref Range    WBC Count 9.6 4.0 - 11.0 10e3/uL    RBC Count 4.83 3.80 - 5.20 10e6/uL    Hemoglobin 14.8 11.7 - 15.7 g/dL    Hematocrit 43.5 35.0 - 47.0 %    MCV 90 78 - 100 fL    MCH 30.6 26.5 - 33.0 pg    MCHC 34.0 31.5 - 36.5 g/dL    RDW 11.9 10.0 - 15.0 %    Platelet Count 410 150 - 450 10e3/uL    % Neutrophils 66 %    % Lymphocytes 19 %    % Monocytes 10 %    % Eosinophils 3 %    % Basophils 1 %    % Immature Granulocytes 1 %    NRBCs per 100 WBC 0 <1 /100    Absolute Neutrophils 6.5 1.6 - 8.3 10e3/uL    Absolute Lymphocytes 1.8 0.8 - 5.3 10e3/uL    Absolute Monocytes 1.0 0.0 - 1.3 10e3/uL    Absolute Eosinophils 0.3 0.0 - 0.7 10e3/uL    Absolute Basophils 0.1 0.0 - 0.2 10e3/uL    Absolute Immature Granulocytes 0.1 <=0.4 10e3/uL    Absolute NRBCs 0.0 10e3/uL   CRP inflammation    Collection Time: 05/25/22  2:08 PM   Result Value Ref Range    CRP 8.2 (H) 0.0 - 0.8 mg/dL   Erythrocyte sedimentation rate auto    Collection Time: 05/25/22  6:48 PM   Result Value Ref Range    Erythrocyte Sedimentation Rate 62 (H) 0 - 20 mm/hr   Lactic acid whole blood    Collection Time: 05/25/22  6:54 PM   Result Value  Ref Range    Lactic Acid 1.3 0.7 - 2.0 mmol/L   Asymptomatic COVID-19 Virus (Coronavirus) by PCR Nasopharyngeal    Collection Time: 05/25/22  6:55 PM    Specimen: Nasopharyngeal; Swab   Result Value Ref Range    SARS CoV2 PCR Negative Negative       Total time spent for face to face visit, reviewing labs/imaging studies, counseling and coordination of care was: 1 Hour 15 Minutes More than 50% of this time was spent on counseling and coordination of care.      This note was dictated using voice recognition software.  Any grammatical or context distortions are unintentional and inherent to the software.

## 2022-05-26 NOTE — PHARMACY-VANCOMYCIN DOSING SERVICE
Pharmacy Vancomycin Initial Note  Date of Service May 25, 2022  Patient's  1960  61 year old, female    Indication: Meningitis    Current estimated CrCl = Estimated Creatinine Clearance: 100.5 mL/min (based on SCr of 0.8 mg/dL).    Creatinine for last 3 days  2022:  2:08 PM Creatinine 0.80 mg/dL    Recent Vancomycin Level(s) for last 3 days  No results found for requested labs within last 72 hours.      Vancomycin IV Administrations (past 72 hours)      No vancomycin orders with administrations in past 72 hours.                Nephrotoxins and other renal medications (From now, onward)    Start     Dose/Rate Route Frequency Ordered Stop    22 2300  ampicillin (OMNIPEN) 2 g vial to attach to  mL bag         2 g  over 15 Minutes Intravenous EVERY 4 HOURS 22 22122 2300  acyclovir (ZOVIRAX) 800 mg in sodium chloride 0.9 % 250 mL intermittent infusion         10 mg/kg × 84.1 kg (Adjusted)  250 mL/hr over 1 Hours Intravenous EVERY 8 HOURS 22 2226      22 2300  vancomycin (VANCOCIN) 1,750 mg in sodium chloride 0.9 % 500 mL intermittent infusion         1,750 mg  over 2 Hours Intravenous EVERY 12 HOURS 22 2239            Contrast Orders - past 72 hours (72h ago, onward)    Start     Dose/Rate Route Frequency Stop    22 1630  gadobutrol (GADAVIST) injection 12 mL         12 mL Intravenous ONCE 22 1614              Plan:  1. Start vancomycin  1750 mg IV q12h.   2. Vancomycin monitoring method: Trough (Method 2 = manual dose calculation)  3. Vancomycin therapeutic monitoring goal: 15-20 mg/L  4. Pharmacy will check vancomycin levels as appropriate in 1-3 Days.      Chelsi Torres, [facility]

## 2022-05-26 NOTE — PROGRESS NOTES
"  Neurointerventional Surgery  Pre Sedation Assessment    We are asked to perform a lumbar drain on this 61 year old female     HPI: Presented to the ED with visual changes described in a black spot in both eyes and change in headache pattern.  Dr Joseph has requested a lumbar drain placement for likely diagnosis of benign intracranial hypertension       Allergies   Allergen Reactions     Pcn [Penicillins]      Unknown, childhood rxn       Labs: Hgb 14.8, plt 410     Sedation history: Previous problems with sedation. No though states it \"doesn't take much\"  History of sleep apnea Yes    Exam:   /66   Pulse 80   Temp 100.2  F (37.9  C) (Oral)   Resp 16   Ht 5' 7\" (1.702 m)   Wt 260 lb (117.9 kg)   SpO2 95%   BMI 40.72 kg/m    Neuro: A/O x4, speech clear and fluent. JEFFERSON  Cardiac: regular  Lungs: clear  Mallampati:  III - Only soft palate is visible. Intubation is predicted to be difficult  ASA: 3 - Severe systemic disease, but not incapacitating    Okay to proceed with planned procedure using moderate IV sedation  Procedure its risks, benefits, and alternatives including but not limited to bleeding, infection, CSF leak, medication reaction were discussed with patient.  Questions and answered and they give written and verbal consent to proceed.    GERTRUDIS Arrington CNP    Office: [phone]  "

## 2022-05-26 NOTE — PROGRESS NOTES
PT states she used to use a CPAP at home, however she discovered that she was allergic to the plastics in the masks.    Thierno Ashford, RT  5/26/2022

## 2022-05-26 NOTE — PLAN OF CARE
Goal Outcome Evaluation:        Patient transferred here from ER, patient had a lumbar drain placed in IR.  Patient alert and oriented, VSS, headache relieved with draining 10cc from lumbar drain.  Tylenol also given per request of patient.  Will continue to monitor.

## 2022-05-26 NOTE — CONSULTS
[facility]    Infectious Disease Consultation     Date of Admission:  5/25/2022  Date of Consult (When I saw the patient): 05/26/22    Assessment & Plan   Parul Veliz is a 61 year old female who was admitted on 5/25/2022.     Impression:  1. Headache, fever intracranial hypertension  2. MRI with white matter changes, scattered  3. History of breast cancer status postmastectomy  4. History of hypertension  5. Admitted with progressively worsening headache with visual symptoms  6. LP done, CSF with 2 nucleated cells  7. Patient has been out in the woods, does not recall any tick bites    Recommendations:    1. Neurology following, and plan for either  shunt or optic nerve fenestration for benign intracranial hypertension if diagnosis confirmed and will require transfer to [facility]  2. Follow CSF panel, ordered meningitis encephalitis panel though less likely.  3. Empiric broad-spectrum anti-infectives: Patient is on acyclovir ampicillin and ceftriaxone and vancomycin  4. Empiric doxycycline  5. De-escalate based on CSF results  6. Tickborne illness work-up  7. Discussed with patient, patient's mother at bedside  8. Updated patient's nurse  9. Thank you for consulting infectious disease.    Payal Caldwell MD  [facility]  [phone]        Reason for Consult   Reason for consult: I was asked to evaluate this patient for possible CNS infection    Primary Care Physician   [facility]    Chief Complaint   Severe headache, fever    History is obtained from the patient and medical records    History of Present Illness   Parul Veliz is a 61 year old female who presents with 1 week history of severe headache, primarily occipital and then progressively worse.  Over the last 1 week she has described black spots in the medial aspect of both eyes.  Patient also has had history of sinusitis though this is not an active issue at this  time.  Due to worsening pain headache and visual symptoms patient presented to the ED.  Patient underwent LP, results discussed with patient and patient's mother  Empiric antimicrobials  Denies any known sick contacts      Past Medical History   I have reviewed this patient's medical history and updated it with pertinent information if needed.   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  Past Surgical History:   Procedure Laterality Date     CHOLECYSTECTOMY, LAPOROSCOPIC  6/2011    Cholecystectomy, Laparoscopic     COLONOSCOPY  2011     HYSTERECTOMY, SANDRA  2/08    radical hysterectomy, BSO, hx of cx ca       Prior to Admission Medications   Prior to Admission Medications   Prescriptions Last Dose Informant Patient Reported? Taking?   albuterol (PROAIR HFA/PROVENTIL HFA/VENTOLIN HFA) 108 (90 Base) MCG/ACT inhaler Unknown  Yes Yes   Sig: Inhale 2 puffs into the lungs every 6 hours as needed for shortness of breath / dyspnea or wheezing   aspirin 81 MG EC tablet 5/25/2022  Yes Yes   Sig: Take 81 mg by mouth daily   cetirizine (ZYRTEC) 10 MG tablet 5/25/2022  Yes Yes   Sig: Take 10 mg by mouth daily   hydrochlorothiazide (HYDRODIURIL) 25 MG tablet 5/25/2022  Yes Yes   Sig: Take 25 mg by mouth daily   valsartan (DIOVAN) 80 MG tablet 5/25/2022  No Yes   Sig: Take 1 tablet by mouth daily.      Facility-Administered Medications: None     Allergies   Allergies   Allergen  Reactions     Pcn [Penicillins]      Unknown, childhood rxn       Immunization History   Immunization History   Administered Date(s) Administered     COVID-19,PF,Moderna 03/14/2021, 04/11/2021, 11/13/2021     HEPA 05/10/2012     Influenza (IIV3) PF 09/14/2009, 09/02/2011     Pneumococcal 23 valent 09/25/2007     TD (ADULT, 7+) 03/29/2005     TDAP Vaccine (Adacel) 05/10/2012     Yellow Fever 05/10/2012       Social History   I have reviewed this patient's social history and updated it with pertinent information if needed. Parul Veliz  reports that she has been smoking cigarettes. She has a 20.00 pack-year smoking history. She has never used smokeless tobacco. She reports current alcohol use. She reports that she does not use drugs.    Family History   I have reviewed this patient's family history and updated it with pertinent information if needed.   Family History   Problem Relation Age of Onset     Cerebrovascular Disease Mother      Breast Cancer Maternal Aunt      Breast Cancer Paternal Aunt        Review of Systems   The 10 point Review of Systems is negative other than noted in the HPI or here.     Physical Exam   Temp: 100.2  F (37.9  C) Temp src: Oral BP: 123/66 Pulse: 80   Resp: 16 SpO2: 96 % O2 Device: None (Room air)    Vital Signs with Ranges  Temp:  [97.6  F (36.4  C)-102.6  F (39.2  C)] 100.2  F (37.9  C)  Pulse:  [] 80  Resp:  [16-19] 16  BP: (123-185)/(66-94) 123/66  SpO2:  [92 %-99 %] 96 %  260 lbs 0 oz  Body mass index is 40.72 kg/m .    GENERAL APPEARANCE:  awake  EYES: Patient has bilateral papilledema per neurology note  HENT: ear canals and TM's normal and nose and mouth without ulcers or lesions  NECK: Normal range of motion appears supple  RESP: lungs clear to auscultation - no rales, rhonchi or wheezes  CV: S1 S2  LYMPHATICS: edema  ABDOMEN: non tender  MS: extremities normal- no gross deformities noted  SKIN: warm  NEURO: awake, able to answer questions      Data   Lab Results    Component Value Date    WBC 5.6 05/26/2022    WBC 9.6 05/25/2022    WBC 6.1 05/23/2011    WBC 6.5 03/18/2008    WBC 5.6 02/20/2008    HGB 12.5 05/26/2022    HGB 14.8 05/25/2022    HGB 15.2 05/23/2011    HGB 14.6 06/19/2008    HGB 14.8 03/18/2008    HCT 36.3 05/26/2022    HCT 43.5 05/25/2022    HCT 44.1 05/23/2011    HCT 42.8 03/18/2008    HCT 32.9 (L) 02/20/2008     05/26/2022     05/25/2022     05/23/2011     03/18/2008     02/20/2008     05/26/2022     05/25/2022     09/25/2012     05/23/2011     06/03/2010    POTASSIUM 3.7 05/26/2022    POTASSIUM 3.5 05/25/2022    POTASSIUM 4.4 09/25/2012    POTASSIUM 4.1 05/23/2011    POTASSIUM 4.2 06/03/2010    CHLORIDE 106 05/26/2022    CHLORIDE 99 05/25/2022    CHLORIDE 106 09/25/2012    CHLORIDE 109 05/23/2011    CHLORIDE 108 (H) 06/03/2010    CO2 24 05/26/2022    CO2 27 05/25/2022    CO2 21 (L) 09/25/2012    CO2 22 05/23/2011    CO2 23 06/03/2010    BUN 11 05/26/2022    BUN 12 05/25/2022    BUN 13.1 09/25/2012    BUN 11 09/25/2012    BUN 13 05/23/2011    CR 0.69 05/26/2022    CR 0.80 05/25/2022    CR 0.8 09/25/2012    CR 0.72 05/23/2011    CR 0.8 06/03/2010     (H) 05/26/2022     05/25/2022    GLC 92 09/25/2012    GLC 90 05/23/2011    GLC 85 06/03/2010    SED 62 (H) 05/25/2022    AST 15 05/25/2022    AST 19 05/23/2011    AST 24 03/18/2008    AST 21 02/13/2008    ALT 20 05/25/2022    ALT 22 09/25/2012    ALT 14 05/23/2011    ALT 16 06/03/2010    ALT 22 01/20/2009    ALKPHOS 156 (H) 05/25/2022    ALKPHOS 150 05/23/2011    ALKPHOS 117 02/13/2008    BILITOTAL 0.4 05/25/2022    BILITOTAL 0.4 05/23/2011    BILITOTAL 0.3 03/18/2008    BILITOTAL 0.6 02/13/2008    INR 1.05 05/25/2022    INR 0.92 10/23/2008    INR 2.20 (H) 07/22/2008    INR 3.9 07/10/2008    INR 1.9 06/26/2008     No results for input(s): CULT in the last 168 hours.  No lab results found.    Invalid input(s): UC    MICRO:  05/26/2022  1031 05/26/2022 1433 Gram stain [[id number]]   Cerebrospinal fluid from Lumbar Puncture    Final result Component Value   Gram Stain Result --   [facility]. [facility]          05/26/2022 1031 05/26/2022 1437 Cerebrospinal fluid Aerobic Bacterial Culture Routine [[id number]]   Cerebrospinal fluid from Lumbar Puncture    Preliminary result Component Value   Gram Stain Result No organisms seen P    3+ WBC seen P    Predominantly mononuclear cells             05/26/2022 1031 05/26/2022 1258 CSF Cell Count with Differential: [[id number]]    (Abnormal)   Cerebrospinal fluid from Spinal Cord    Final result Component Value   No component results          05/26/2022 1031 05/26/2022 1052 Anaerobic CSF culture [[id number]]   Cerebrospinal fluid from Lumbar Puncture    In process Component Value   No component results             05/26/2022 1031 05/26/2022 1258 Cell Count CSF [[id number]]   (Abnormal)   Cerebrospinal fluid from Spinal Cord    Final result Component Value Units   Tube Number 4    Color Colorless    Clarity Clear    Total Nucleated Cells 2 /uL   RBC Count 4 High  /uL          05/26/2022 1031 05/26/2022 1639 Varicella zoster virus by PCR [[id number]]   Cerebrospinal fluid from Lumbar Puncture    In process Component Value   No component results          05/25/2022 1855 05/25/2022 1958 Asymptomatic COVID-19 Virus (Coronavirus) by PCR Nasopharyngeal [[id number]]    Swab from Nasopharyngeal    Final result Component Value   SARS CoV2 PCR Negative   NEGATIVE: SARS-CoV-2 (COVID-19) RNA not detected, presumed negative.          05/25/2022 1854 05/26/2022 1604 Blood Culture Peripheral Blood [[id number]]    Peripheral Blood    Preliminary result Component Value   Culture No growth after 12 hours P               RADIOLOGY:    MRA Brain ([place]) wo Contrast    Result Date: 5/25/2022  EXAM: MRV BRAIN W/O CONTRAST, MRA NECK (CAROTIDS) W/O and W CONTRAST, MRA BRAIN ([place])  W/O CONTRAST, MR BRAIN W/O and W CONTRAST LOCATION: [facility] DATE/TIME: 5/25/2022 3:09 PM INDICATION: COMPARISON: None. CONTRAST: 12mL gadavist TECHNIQUE: 1) Routine multiplanar multisequence head MRI without and with intravenous contrast. 2) 3D time-of-flight head MRA without intravenous contrast. 3) Neck MRA without and with IV contrast. Stenosis measurements made according to NASCET criteria unless otherwise specified. 4) Phase contrast and 2-D time-of-flight head MRV without intravenous contrast. FINDINGS: HEAD MRI: INTRACRANIAL CONTENTS: No acute or subacute infarct. No mass, acute hemorrhage, or extra-axial fluid collections. Scattered nonspecific foci of T2/FLAIR hyperintense signal in the cerebral white matter. Normal ventricles and sulci. Normal position of the  cerebellar tonsils. No pathologic contrast enhancement. SELLA: No abnormality accounting for technique. OSSEOUS STRUCTURES/SOFT TISSUES: Normal marrow signal. The major intracranial vascular flow voids are maintained. ORBITS: No abnormality accounting for technique. SINUSES/MASTOIDS: No paranasal sinus mucosal disease. No middle ear or mastoid effusion. HEAD MRA: ANTERIOR CIRCULATION: No stenosis/occlusion, aneurysm, or high flow vascular malformation. Standard [place] anatomy. POSTERIOR CIRCULATION: No stenosis/occlusion, aneurysm, or high flow vascular malformation. Balanced vertebral arteries supply a normal basilar artery. NECK MRA: RIGHT CAROTID: No measurable stenosis or dissection. LEFT CAROTID: No measurable stenosis or dissection. VERTEBRAL ARTERIES: No focal stenosis or dissection. Balanced vertebral arteries. AORTIC ARCH: Classic aortic arch anatomy with no significant stenosis at the origin of the great vessels. HEAD MRV: DURAL SINUSES: Severe stenosis of the bilateral distal transverse sinuses. No thrombosis. No additional significant stenosis. Codominant transverse and sigmoid sinuses. INTERNAL  CEREBRAL VEINS: No significant stenosis or occlusion.  MAJOR CORTICAL VENOUS BRANCHES: No significant stenosis or occlusion.     IMPRESSION: HEAD MRI: 1.  No acute intracranial abnormality 2.  Nonspecific scattered small foci of T2 FLAIR hyperintensity in the cerebral white matter can be seen in setting of chronic small vessel ischemic changes, prior trauma or insult, or migraines. Demyelination is not favored. HEAD MRA: 1.  Normal MRA [place]. NECK MRA: 1.  Normal neck MRA. HEAD MRV: 1.  Severe stenosis of the distal transverse sinuses bilaterally, which is nonspecific but can be seen in setting of idiopathic intracranial hypertension. 2.  No additional cervical stenosis or occlusion. No thrombosis.    MRA Neck (Carotids) wo & w Contrast    Result Date: 5/25/2022  EXAM: MRV BRAIN W/O CONTRAST, MRA NECK (CAROTIDS) W/O and W CONTRAST, MRA BRAIN ([place]) W/O CONTRAST, MR BRAIN W/O and W CONTRAST LOCATION: [facility] DATE/TIME: 5/25/2022 3:09 PM INDICATION: COMPARISON: None. CONTRAST: 12mL gadavist TECHNIQUE: 1) Routine multiplanar multisequence head MRI without and with intravenous contrast. 2) 3D time-of-flight head MRA without intravenous contrast. 3) Neck MRA without and with IV contrast. Stenosis measurements made according to NASCET criteria unless otherwise specified. 4) Phase contrast and 2-D time-of-flight head MRV without intravenous contrast. FINDINGS: HEAD MRI: INTRACRANIAL CONTENTS: No acute or subacute infarct. No mass, acute hemorrhage, or extra-axial fluid collections. Scattered nonspecific foci of T2/FLAIR hyperintense signal in the cerebral white matter. Normal ventricles and sulci. Normal position of the  cerebellar tonsils. No pathologic contrast enhancement. SELLA: No abnormality accounting for technique. OSSEOUS STRUCTURES/SOFT TISSUES: Normal marrow signal. The major intracranial vascular flow voids are maintained. ORBITS: No abnormality accounting for  technique. SINUSES/MASTOIDS: No paranasal sinus mucosal disease. No middle ear or mastoid effusion. HEAD MRA: ANTERIOR CIRCULATION: No stenosis/occlusion, aneurysm, or high flow vascular malformation. Standard [place] anatomy. POSTERIOR CIRCULATION: No stenosis/occlusion, aneurysm, or high flow vascular malformation. Balanced vertebral arteries supply a normal basilar artery. NECK MRA: RIGHT CAROTID: No measurable stenosis or dissection. LEFT CAROTID: No measurable stenosis or dissection. VERTEBRAL ARTERIES: No focal stenosis or dissection. Balanced vertebral arteries. AORTIC ARCH: Classic aortic arch anatomy with no significant stenosis at the origin of the great vessels. HEAD MRV: DURAL SINUSES: Severe stenosis of the bilateral distal transverse sinuses. No thrombosis. No additional significant stenosis. Codominant transverse and sigmoid sinuses. INTERNAL CEREBRAL VEINS: No significant stenosis or occlusion.  MAJOR CORTICAL VENOUS BRANCHES: No significant stenosis or occlusion.     IMPRESSION: HEAD MRI: 1.  No acute intracranial abnormality 2.  Nonspecific scattered small foci of T2 FLAIR hyperintensity in the cerebral white matter can be seen in setting of chronic small vessel ischemic changes, prior trauma or insult, or migraines. Demyelination is not favored. HEAD MRA: 1.  Normal MRA [place]. NECK MRA: 1.  Normal neck MRA. HEAD MRV: 1.  Severe stenosis of the distal transverse sinuses bilaterally, which is nonspecific but can be seen in setting of idiopathic intracranial hypertension. 2.  No additional cervical stenosis or occlusion. No thrombosis.    MR Brain w/o & w Contrast    Result Date: 5/25/2022  EXAM: MRV BRAIN W/O CONTRAST, MRA NECK (CAROTIDS) W/O and W CONTRAST, MRA BRAIN ([place]) W/O CONTRAST, MR BRAIN W/O and W CONTRAST LOCATION: [facility] DATE/TIME: 5/25/2022 3:09 PM INDICATION: COMPARISON: None. CONTRAST: 12mL gadavist TECHNIQUE: 1) Routine  multiplanar multisequence head MRI without and with intravenous contrast. 2) 3D time-of-flight head MRA without intravenous contrast. 3) Neck MRA without and with IV contrast. Stenosis measurements made according to NASCET criteria unless otherwise specified. 4) Phase contrast and 2-D time-of-flight head MRV without intravenous contrast. FINDINGS: HEAD MRI: INTRACRANIAL CONTENTS: No acute or subacute infarct. No mass, acute hemorrhage, or extra-axial fluid collections. Scattered nonspecific foci of T2/FLAIR hyperintense signal in the cerebral white matter. Normal ventricles and sulci. Normal position of the  cerebellar tonsils. No pathologic contrast enhancement. SELLA: No abnormality accounting for technique. OSSEOUS STRUCTURES/SOFT TISSUES: Normal marrow signal. The major intracranial vascular flow voids are maintained. ORBITS: No abnormality accounting for technique. SINUSES/MASTOIDS: No paranasal sinus mucosal disease. No middle ear or mastoid effusion. HEAD MRA: ANTERIOR CIRCULATION: No stenosis/occlusion, aneurysm, or high flow vascular malformation. Standard [place] anatomy. POSTERIOR CIRCULATION: No stenosis/occlusion, aneurysm, or high flow vascular malformation. Balanced vertebral arteries supply a normal basilar artery. NECK MRA: RIGHT CAROTID: No measurable stenosis or dissection. LEFT CAROTID: No measurable stenosis or dissection. VERTEBRAL ARTERIES: No focal stenosis or dissection. Balanced vertebral arteries. AORTIC ARCH: Classic aortic arch anatomy with no significant stenosis at the origin of the great vessels. HEAD MRV: DURAL SINUSES: Severe stenosis of the bilateral distal transverse sinuses. No thrombosis. No additional significant stenosis. Codominant transverse and sigmoid sinuses. INTERNAL CEREBRAL VEINS: No significant stenosis or occlusion.  MAJOR CORTICAL VENOUS BRANCHES: No significant stenosis or occlusion.     IMPRESSION: HEAD MRI: 1.  No acute intracranial abnormality 2.   Nonspecific scattered small foci of T2 FLAIR hyperintensity in the cerebral white matter can be seen in setting of chronic small vessel ischemic changes, prior trauma or insult, or migraines. Demyelination is not favored. HEAD MRA: 1.  Normal MRA [place]. NECK MRA: 1.  Normal neck MRA. HEAD MRV: 1.  Severe stenosis of the distal transverse sinuses bilaterally, which is nonspecific but can be seen in setting of idiopathic intracranial hypertension. 2.  No additional cervical stenosis or occlusion. No thrombosis.    MRV Brain wo Contrast    Result Date: 5/25/2022  EXAM: MRV BRAIN W/O CONTRAST, MRA NECK (CAROTIDS) W/O and W CONTRAST, MRA BRAIN ([place]) W/O CONTRAST, MR BRAIN W/O and W CONTRAST LOCATION: [facility] DATE/TIME: 5/25/2022 3:09 PM INDICATION: COMPARISON: None. CONTRAST: 12mL gadavist TECHNIQUE: 1) Routine multiplanar multisequence head MRI without and with intravenous contrast. 2) 3D time-of-flight head MRA without intravenous contrast. 3) Neck MRA without and with IV contrast. Stenosis measurements made according to NASCET criteria unless otherwise specified. 4) Phase contrast and 2-D time-of-flight head MRV without intravenous contrast. FINDINGS: HEAD MRI: INTRACRANIAL CONTENTS: No acute or subacute infarct. No mass, acute hemorrhage, or extra-axial fluid collections. Scattered nonspecific foci of T2/FLAIR hyperintense signal in the cerebral white matter. Normal ventricles and sulci. Normal position of the  cerebellar tonsils. No pathologic contrast enhancement. SELLA: No abnormality accounting for technique. OSSEOUS STRUCTURES/SOFT TISSUES: Normal marrow signal. The major intracranial vascular flow voids are maintained. ORBITS: No abnormality accounting for technique. SINUSES/MASTOIDS: No paranasal sinus mucosal disease. No middle ear or mastoid effusion. HEAD MRA: ANTERIOR CIRCULATION: No stenosis/occlusion, aneurysm, or high flow vascular malformation.  Standard [place] anatomy. POSTERIOR CIRCULATION: No stenosis/occlusion, aneurysm, or high flow vascular malformation. Balanced vertebral arteries supply a normal basilar artery. NECK MRA: RIGHT CAROTID: No measurable stenosis or dissection. LEFT CAROTID: No measurable stenosis or dissection. VERTEBRAL ARTERIES: No focal stenosis or dissection. Balanced vertebral arteries. AORTIC ARCH: Classic aortic arch anatomy with no significant stenosis at the origin of the great vessels. HEAD MRV: DURAL SINUSES: Severe stenosis of the bilateral distal transverse sinuses. No thrombosis. No additional significant stenosis. Codominant transverse and sigmoid sinuses. INTERNAL CEREBRAL VEINS: No significant stenosis or occlusion.  MAJOR CORTICAL VENOUS BRANCHES: No significant stenosis or occlusion.     IMPRESSION: HEAD MRI: 1.  No acute intracranial abnormality 2.  Nonspecific scattered small foci of T2 FLAIR hyperintensity in the cerebral white matter can be seen in setting of chronic small vessel ischemic changes, prior trauma or insult, or migraines. Demyelination is not favored. HEAD MRA: 1.  Normal MRA [place]. NECK MRA: 1.  Normal neck MRA. HEAD MRV: 1.  Severe stenosis of the distal transverse sinuses bilaterally, which is nonspecific but can be seen in setting of idiopathic intracranial hypertension. 2.  No additional cervical stenosis or occlusion. No thrombosis.

## 2022-05-27 ENCOUNTER — HOSPITAL ENCOUNTER (INPATIENT)
Facility: CLINIC | Age: 62
LOS: 3 days | Discharge: HOME HEALTH | End: 2022-05-30
Attending: NEUROLOGICAL SURGERY | Admitting: NEUROLOGICAL SURGERY
Payer: COMMERCIAL

## 2022-05-27 VITALS
TEMPERATURE: 97.9 F | HEIGHT: 67 IN | SYSTOLIC BLOOD PRESSURE: 130 MMHG | BODY MASS INDEX: 44.25 KG/M2 | RESPIRATION RATE: 28 BRPM | OXYGEN SATURATION: 94 % | HEART RATE: 74 BPM | DIASTOLIC BLOOD PRESSURE: 63 MMHG | WEIGHT: 281.9 LBS

## 2022-05-27 DIAGNOSIS — G93.2 IDIOPATHIC INTRACRANIAL HYPERTENSION: Primary | ICD-10-CM

## 2022-05-27 DIAGNOSIS — G93.2 BENIGN INTRACRANIAL HYPERTENSION: ICD-10-CM

## 2022-05-27 LAB
ACE SERPL-CCNC: 62 U/L
B BURGDOR IGG+IGM SER QL: 0.06
C GATTII+NEOFOR DNA CSF QL NAA+NON-PROBE: NEGATIVE
CMV DNA CSF QL NAA+NON-PROBE: NEGATIVE
E COLI K1 AG CSF QL: NEGATIVE
EV RNA SPEC QL NAA+PROBE: NEGATIVE
GP B STREP DNA CSF QL NAA+NON-PROBE: NEGATIVE
HAEM INFLU DNA CSF QL NAA+NON-PROBE: NEGATIVE
HHV6 DNA CSF QL NAA+NON-PROBE: NEGATIVE
HSV1 DNA CSF QL NAA+NON-PROBE: NEGATIVE
HSV1 DNA CSF QL NAA+PROBE: NOT DETECTED
HSV2 DNA CSF QL NAA+NON-PROBE: NEGATIVE
HSV2 DNA CSF QL NAA+PROBE: NOT DETECTED
L MONOCYTOG DNA CSF QL NAA+NON-PROBE: NEGATIVE
MAGNESIUM SERPL-MCNC: 2.4 MG/DL (ref 1.6–2.3)
MISCELLANEOUS TEST 1 (ARUP): NORMAL
N MEN DNA CSF QL NAA+NON-PROBE: NEGATIVE
PARECHOVIRUS A RNA CSF QL NAA+NON-PROBE: NEGATIVE
PHOSPHATE SERPL-MCNC: 2.4 MG/DL (ref 2.5–4.5)
POTASSIUM BLD-SCNC: 2.7 MMOL/L (ref 3.4–5.3)
S PNEUM DNA CSF QL NAA+NON-PROBE: NEGATIVE
VZV DNA CSF QL NAA+NON-PROBE: NEGATIVE
VZV DNA SPEC QL NAA+PROBE: NEGATIVE

## 2022-05-27 PROCEDURE — 83735 ASSAY OF MAGNESIUM: CPT | Performed by: STUDENT IN AN ORGANIZED HEALTH CARE EDUCATION/TRAINING PROGRAM

## 2022-05-27 PROCEDURE — 250N000011 HC RX IP 250 OP 636: Performed by: PSYCHIATRY & NEUROLOGY

## 2022-05-27 PROCEDURE — 250N000013 HC RX MED GY IP 250 OP 250 PS 637: Performed by: INTERNAL MEDICINE

## 2022-05-27 PROCEDURE — 258N000003 HC RX IP 258 OP 636: Performed by: PSYCHIATRY & NEUROLOGY

## 2022-05-27 PROCEDURE — 36415 COLL VENOUS BLD VENIPUNCTURE: CPT | Performed by: STUDENT IN AN ORGANIZED HEALTH CARE EDUCATION/TRAINING PROGRAM

## 2022-05-27 PROCEDURE — 99239 HOSP IP/OBS DSCHRG MGMT >30: CPT | Performed by: INTERNAL MEDICINE

## 2022-05-27 PROCEDURE — 99233 SBSQ HOSP IP/OBS HIGH 50: CPT | Performed by: INTERNAL MEDICINE

## 2022-05-27 PROCEDURE — 200N000002 HC R&B ICU UMMC

## 2022-05-27 PROCEDURE — 250N000011 HC RX IP 250 OP 636: Performed by: INTERNAL MEDICINE

## 2022-05-27 PROCEDURE — 250N000013 HC RX MED GY IP 250 OP 250 PS 637: Performed by: STUDENT IN AN ORGANIZED HEALTH CARE EDUCATION/TRAINING PROGRAM

## 2022-05-27 PROCEDURE — 250N000013 HC RX MED GY IP 250 OP 250 PS 637: Performed by: PSYCHIATRY & NEUROLOGY

## 2022-05-27 PROCEDURE — 84100 ASSAY OF PHOSPHORUS: CPT | Performed by: STUDENT IN AN ORGANIZED HEALTH CARE EDUCATION/TRAINING PROGRAM

## 2022-05-27 PROCEDURE — 84132 ASSAY OF SERUM POTASSIUM: CPT | Performed by: STUDENT IN AN ORGANIZED HEALTH CARE EDUCATION/TRAINING PROGRAM

## 2022-05-27 PROCEDURE — 99232 SBSQ HOSP IP/OBS MODERATE 35: CPT | Performed by: PSYCHIATRY & NEUROLOGY

## 2022-05-27 RX ORDER — POLYETHYLENE GLYCOL 3350 17 G/17G
17 POWDER, FOR SOLUTION ORAL DAILY
Status: DISCONTINUED | OUTPATIENT
Start: 2022-05-28 | End: 2022-05-30 | Stop reason: HOSPADM

## 2022-05-27 RX ORDER — BISACODYL 10 MG
10 SUPPOSITORY, RECTAL RECTAL DAILY PRN
Status: DISCONTINUED | OUTPATIENT
Start: 2022-05-27 | End: 2022-05-30 | Stop reason: HOSPADM

## 2022-05-27 RX ORDER — LIDOCAINE 40 MG/G
CREAM TOPICAL
Status: ON HOLD | DISCHARGE
Start: 2022-05-27 | End: 2022-05-27

## 2022-05-27 RX ORDER — AMOXICILLIN 250 MG
1 CAPSULE ORAL 2 TIMES DAILY
Status: DISCONTINUED | OUTPATIENT
Start: 2022-05-27 | End: 2022-05-30 | Stop reason: HOSPADM

## 2022-05-27 RX ORDER — OXYCODONE HYDROCHLORIDE 5 MG/1
5 TABLET ORAL EVERY 4 HOURS PRN
Status: DISCONTINUED | OUTPATIENT
Start: 2022-05-27 | End: 2022-05-30 | Stop reason: HOSPADM

## 2022-05-27 RX ORDER — ACETAMINOPHEN 650 MG/1
650 SUPPOSITORY RECTAL EVERY 6 HOURS PRN
Status: ON HOLD | DISCHARGE
Start: 2022-05-27 | End: 2022-05-27

## 2022-05-27 RX ORDER — HYDROCHLOROTHIAZIDE 25 MG/1
25 TABLET ORAL DAILY
Status: DISCONTINUED | OUTPATIENT
Start: 2022-05-28 | End: 2022-05-30 | Stop reason: HOSPADM

## 2022-05-27 RX ORDER — NALOXONE HYDROCHLORIDE 0.4 MG/ML
0.4 INJECTION, SOLUTION INTRAMUSCULAR; INTRAVENOUS; SUBCUTANEOUS ONCE
Status: ON HOLD | DISCHARGE
Start: 2022-05-27 | End: 2022-05-27

## 2022-05-27 RX ORDER — ONDANSETRON 4 MG/1
4 TABLET, ORALLY DISINTEGRATING ORAL EVERY 6 HOURS PRN
Status: DISCONTINUED | OUTPATIENT
Start: 2022-05-27 | End: 2022-05-30 | Stop reason: HOSPADM

## 2022-05-27 RX ORDER — OXYCODONE HYDROCHLORIDE 10 MG/1
10 TABLET ORAL EVERY 4 HOURS PRN
Status: DISCONTINUED | OUTPATIENT
Start: 2022-05-27 | End: 2022-05-30 | Stop reason: HOSPADM

## 2022-05-27 RX ORDER — VALSARTAN 80 MG/1
80 TABLET ORAL DAILY
Status: DISCONTINUED | OUTPATIENT
Start: 2022-05-28 | End: 2022-05-30 | Stop reason: HOSPADM

## 2022-05-27 RX ORDER — CETIRIZINE HYDROCHLORIDE 10 MG/1
10 TABLET ORAL DAILY
COMMUNITY

## 2022-05-27 RX ORDER — NALOXONE HYDROCHLORIDE 0.4 MG/ML
0.2 INJECTION, SOLUTION INTRAMUSCULAR; INTRAVENOUS; SUBCUTANEOUS ONCE
Status: ON HOLD | DISCHARGE
Start: 2022-05-27 | End: 2022-05-27

## 2022-05-27 RX ORDER — ACETAMINOPHEN 325 MG/1
650 TABLET ORAL EVERY 6 HOURS PRN
Status: ON HOLD | DISCHARGE
Start: 2022-05-27 | End: 2022-05-27

## 2022-05-27 RX ORDER — HYDRALAZINE HYDROCHLORIDE 20 MG/ML
10-20 INJECTION INTRAMUSCULAR; INTRAVENOUS EVERY 30 MIN PRN
Status: DISCONTINUED | OUTPATIENT
Start: 2022-05-27 | End: 2022-05-30 | Stop reason: HOSPADM

## 2022-05-27 RX ORDER — ALBUTEROL SULFATE 90 UG/1
2 AEROSOL, METERED RESPIRATORY (INHALATION) EVERY 6 HOURS PRN
Status: DISCONTINUED | OUTPATIENT
Start: 2022-05-27 | End: 2022-05-30 | Stop reason: HOSPADM

## 2022-05-27 RX ORDER — POTASSIUM CHLORIDE 750 MG/1
40 TABLET, EXTENDED RELEASE ORAL ONCE
Status: COMPLETED | OUTPATIENT
Start: 2022-05-27 | End: 2022-05-27

## 2022-05-27 RX ORDER — METOCLOPRAMIDE HYDROCHLORIDE 5 MG/ML
10 INJECTION INTRAMUSCULAR; INTRAVENOUS ONCE
Qty: 2 ML | Refills: 0 | Status: ON HOLD | DISCHARGE
Start: 2022-05-27 | End: 2022-05-27

## 2022-05-27 RX ORDER — ASPIRIN 81 MG/1
81 TABLET ORAL DAILY
Status: ON HOLD | COMMUNITY
End: 2022-05-29

## 2022-05-27 RX ORDER — LIDOCAINE 40 MG/G
CREAM TOPICAL
Status: DISCONTINUED | OUTPATIENT
Start: 2022-05-27 | End: 2022-05-30 | Stop reason: HOSPADM

## 2022-05-27 RX ORDER — HYDRALAZINE HYDROCHLORIDE 10 MG/1
10 TABLET, FILM COATED ORAL 4 TIMES DAILY PRN
Status: ON HOLD | DISCHARGE
Start: 2022-05-27 | End: 2022-05-27

## 2022-05-27 RX ORDER — AMOXICILLIN 250 MG
1 CAPSULE ORAL 2 TIMES DAILY
Status: ON HOLD | DISCHARGE
Start: 2022-05-27 | End: 2022-05-27

## 2022-05-27 RX ORDER — FLUMAZENIL 0.1 MG/ML
0.2 INJECTION, SOLUTION INTRAVENOUS ONCE
Status: ON HOLD | DISCHARGE
Start: 2022-05-27 | End: 2022-05-27

## 2022-05-27 RX ORDER — ONDANSETRON 4 MG/1
4 TABLET, ORALLY DISINTEGRATING ORAL EVERY 6 HOURS PRN
Status: ON HOLD | DISCHARGE
Start: 2022-05-27 | End: 2022-05-27

## 2022-05-27 RX ORDER — ACETAMINOPHEN 325 MG/1
650 TABLET ORAL EVERY 4 HOURS PRN
Status: DISCONTINUED | OUTPATIENT
Start: 2022-05-30 | End: 2022-05-30 | Stop reason: HOSPADM

## 2022-05-27 RX ORDER — ACETAMINOPHEN 325 MG/1
975 TABLET ORAL EVERY 8 HOURS
Status: DISCONTINUED | OUTPATIENT
Start: 2022-05-27 | End: 2022-05-30 | Stop reason: HOSPADM

## 2022-05-27 RX ORDER — POTASSIUM CHLORIDE 750 MG/1
20 TABLET, EXTENDED RELEASE ORAL ONCE
Status: COMPLETED | OUTPATIENT
Start: 2022-05-28 | End: 2022-05-28

## 2022-05-27 RX ORDER — LABETALOL HYDROCHLORIDE 5 MG/ML
10-40 INJECTION, SOLUTION INTRAVENOUS EVERY 10 MIN PRN
Status: DISCONTINUED | OUTPATIENT
Start: 2022-05-27 | End: 2022-05-30 | Stop reason: HOSPADM

## 2022-05-27 RX ORDER — ONDANSETRON 2 MG/ML
4 INJECTION INTRAMUSCULAR; INTRAVENOUS EVERY 6 HOURS PRN
Status: ON HOLD | DISCHARGE
Start: 2022-05-27 | End: 2022-05-27

## 2022-05-27 RX ORDER — OXYCODONE HYDROCHLORIDE 5 MG/1
5 TABLET ORAL EVERY 6 HOURS PRN
Refills: 0 | Status: ON HOLD | DISCHARGE
Start: 2022-05-27 | End: 2022-05-27

## 2022-05-27 RX ORDER — ACETAZOLAMIDE 500 MG/1
500 CAPSULE, EXTENDED RELEASE ORAL EVERY 12 HOURS
Status: ON HOLD | DISCHARGE
Start: 2022-05-27 | End: 2022-05-27

## 2022-05-27 RX ORDER — AMOXICILLIN 250 MG
2 CAPSULE ORAL 2 TIMES DAILY
Status: ON HOLD | DISCHARGE
Start: 2022-05-27 | End: 2022-05-27

## 2022-05-27 RX ORDER — FENTANYL CITRATE 50 UG/ML
25-50 INJECTION, SOLUTION INTRAMUSCULAR; INTRAVENOUS EVERY 5 MIN PRN
Refills: 0 | Status: ON HOLD | DISCHARGE
Start: 2022-05-27 | End: 2022-05-27

## 2022-05-27 RX ORDER — PROCHLORPERAZINE MALEATE 10 MG
10 TABLET ORAL EVERY 6 HOURS PRN
Status: DISCONTINUED | OUTPATIENT
Start: 2022-05-27 | End: 2022-05-30 | Stop reason: HOSPADM

## 2022-05-27 RX ORDER — CALCIUM CARBONATE 500 MG/1
1 TABLET, CHEWABLE ORAL DAILY PRN
Status: ON HOLD | DISCHARGE
Start: 2022-05-27 | End: 2022-05-27

## 2022-05-27 RX ORDER — ACETAZOLAMIDE 500 MG/1
500 CAPSULE, EXTENDED RELEASE ORAL EVERY 12 HOURS
Status: DISCONTINUED | OUTPATIENT
Start: 2022-05-27 | End: 2022-05-30 | Stop reason: HOSPADM

## 2022-05-27 RX ORDER — ONDANSETRON 2 MG/ML
4 INJECTION INTRAMUSCULAR; INTRAVENOUS EVERY 6 HOURS PRN
Status: DISCONTINUED | OUTPATIENT
Start: 2022-05-27 | End: 2022-05-30 | Stop reason: HOSPADM

## 2022-05-27 RX ADMIN — HYDROCHLOROTHIAZIDE 25 MG: 25 TABLET ORAL at 07:55

## 2022-05-27 RX ADMIN — VALSARTAN 80 MG: 80 TABLET, FILM COATED ORAL at 07:56

## 2022-05-27 RX ADMIN — AMPICILLIN SODIUM 2 G: 2 INJECTION, POWDER, FOR SOLUTION INTRAMUSCULAR; INTRAVENOUS at 06:41

## 2022-05-27 RX ADMIN — ACETAZOLAMIDE 500 MG: 500 CAPSULE, EXTENDED RELEASE ORAL at 21:59

## 2022-05-27 RX ADMIN — POTASSIUM CHLORIDE 40 MEQ: 750 TABLET, EXTENDED RELEASE ORAL at 22:33

## 2022-05-27 RX ADMIN — AMPICILLIN SODIUM 2 G: 2 INJECTION, POWDER, FOR SOLUTION INTRAMUSCULAR; INTRAVENOUS at 02:26

## 2022-05-27 RX ADMIN — ACETAMINOPHEN 650 MG: 325 TABLET, FILM COATED ORAL at 07:54

## 2022-05-27 RX ADMIN — ACETAZOLAMIDE 500 MG: 500 CAPSULE, EXTENDED RELEASE ORAL at 07:56

## 2022-05-27 RX ADMIN — SODIUM CHLORIDE 250 MG: 9 INJECTION, SOLUTION INTRAVENOUS at 16:21

## 2022-05-27 RX ADMIN — DOCUSATE SODIUM 50 MG AND SENNOSIDES 8.6 MG 1 TABLET: 8.6; 5 TABLET, FILM COATED ORAL at 07:54

## 2022-05-27 RX ADMIN — SODIUM CHLORIDE 250 MG: 9 INJECTION, SOLUTION INTRAVENOUS at 09:18

## 2022-05-27 RX ADMIN — SODIUM CHLORIDE 250 MG: 9 INJECTION, SOLUTION INTRAVENOUS at 03:55

## 2022-05-27 ASSESSMENT — ACTIVITIES OF DAILY LIVING (ADL)
CONCENTRATING,_REMEMBERING_OR_MAKING_DECISIONS_DIFFICULTY: NO
WALKING_OR_CLIMBING_STAIRS_DIFFICULTY: NO
ADLS_ACUITY_SCORE: 41
DIFFICULTY_EATING/SWALLOWING: NO
ADLS_ACUITY_SCORE: 21
DRESSING/BATHING_DIFFICULTY: NO
FALL_HISTORY_WITHIN_LAST_SIX_MONTHS: NO
CONCENTRATING,_REMEMBERING_OR_MAKING_DECISIONS_DIFFICULTY: NO
ADLS_ACUITY_SCORE: 21
CHANGE_IN_FUNCTIONAL_STATUS_SINCE_ONSET_OF_CURRENT_ILLNESS/INJURY: NO
VISION_MANAGEMENT: GLASSES
ADLS_ACUITY_SCORE: 41
WEAR_GLASSES_OR_BLIND: YES
VISION_MANAGEMENT: GLASSES
ADLS_ACUITY_SCORE: 35
DIFFICULTY_COMMUNICATING: NO
CHANGE_IN_FUNCTIONAL_STATUS_SINCE_ONSET_OF_CURRENT_ILLNESS/INJURY: NO
FALL_HISTORY_WITHIN_LAST_SIX_MONTHS: NO
ADLS_ACUITY_SCORE: 26
ADLS_ACUITY_SCORE: 41
DIFFICULTY_EATING/SWALLOWING: NO
DIFFICULTY_EATING/SWALLOWING: NO
DOING_ERRANDS_INDEPENDENTLY_DIFFICULTY: NO
TOILETING_ISSUES: NO
ADLS_ACUITY_SCORE: 41
ADLS_ACUITY_SCORE: 26
DOING_ERRANDS_INDEPENDENTLY_DIFFICULTY: NO
DRESSING/BATHING_DIFFICULTY: NO
ADLS_ACUITY_SCORE: 26
WALKING_OR_CLIMBING_STAIRS_DIFFICULTY: NO
TOILETING_ISSUES: NO
WEAR_GLASSES_OR_BLIND: YES
ADLS_ACUITY_SCORE: 26

## 2022-05-27 NOTE — DISCHARGE SUMMARY
[facility] MEDICINE  DISCHARGE SUMMARY     Primary Care Physician: Thea New              Admission Date: 5/25/2022   Discharge Provider: Cesario Arriola Discharge Date: 5/27/2022   Diet:   Active Diet and Nourishment Order   Procedures     Regular Diet Adult         Activity: bed rest      Code Status: Full Code         Condition at Discharge: improving      REASON FOR PRESENTATION(See Admission Note for Details)   Headache and vision changes    PRINCIPAL & ACTIVE DISCHARGE DIAGNOSES     Principal Problem:    Benign intracranial hypertension  Active Problems:    Headache on top of head      SIGNIFICANT FINDINGS (Imaging, labs):     EXAM: MRV BRAIN W/O CONTRAST, MRA NECK (CAROTIDS) W/O and W CONTRAST, MRA BRAIN ([place]) W/O CONTRAST, MR BRAIN W/O and W CONTRAST  LOCATION: [facility]  DATE/TIME: 5/25/2022 3:09 PM     INDICATION:  COMPARISON: None.                                                                  IMPRESSION:  HEAD MRI:   1.  No acute intracranial abnormality  2.  Nonspecific scattered small foci of T2 FLAIR hyperintensity in the cerebral white matter can be seen in setting of chronic small vessel ischemic changes, prior trauma or insult, or migraines. Demyelination is not favored.     HEAD MRA:   1.  Normal MRA [place].     NECK MRA:  1.  Normal neck MRA.     HEAD MRV:  1.  Severe stenosis of the distal transverse sinuses bilaterally, which is nonspecific but can be seen in setting of idiopathic intracranial hypertension.  2.  No additional cervical stenosis or occlusion. No thrombosis.  PENDING LABS     none    PROCEDURES ( this hospitalization only)      Lumbar drain    RECOMMENDATION FOR F/U VISIT     Follow up with [facility] physicians  Accepting MD is Dr Pancho Beckford (Neurosurgeon)      DISPOSITION     [facility]    SUMMARY OF HOSPITAL COURSE:      Parul Velzi is a 61 year old  female admitted on 5/25/2022. She has been having around moderate intensity headache since about a week. Since  5/24 around 1200 she started to have vision changes - and has not been able to see the part of the finger with either eye ( when one eye is closed ) but can do finger counting. No focal weakness, confusion, speech changes, facial droop. She also spiked a fever of 102 in ER. She had been in the [facility] in [place] from last Wed to Sunday. Neurology consulted and concern for meningitis/Intracranial Hypertension and recommend LP 5/26. Blood cultures sent, started empiric iv ampicillin, vancomycin, acyclovir, ceftriaxone.         Idiopathic intracranial hypertension with vision impairment  Neurology consulted   LP and rajeev. Done 5/26. Now on lumbar drain in ICU.  -still headache (although much better) and vision changes (no change)  -CSF results so far negative for infection. Discussed with neurology. OK to discontinue antibiotics.  -LP opening pressure 31 cm H2O  - Diamox 500mg bid  -Methylprednisone 250 mg QID for 5 days then oral taper  -Neurologist is concerned about vision changes not better, recommended to call physician in  for possible transfer.  - neurosurgeon Dr Pancho Beckford called back and accepted the transfer (but bed is not available now). Dr Beckford already talked to occular plastic surgeon for possible optical nerve fenestration.        Asthma, exercise induced : on albuterol prn       HTN, Essential : on valsartan 80 mg daily       H/o PE post hysterectomy - 2008 : on baby aspirin       Breast cancer b/l - -2014, s/p mastectomy b/l         Diet: NPO in case procedure  Code Status:  full    Dispostion:  hospital    Patient's other chronic medical conditions are stable and home medications were continued.    Discharge Medications with Med changes:       Current Discharge Medication List      START taking these medications    Details   acetaminophen (TYLENOL) 325 MG tablet  Take 2 tablets (650 mg) by mouth every 6 hours as needed for mild pain or other (and adjunct with moderate or severe pain or per patient request)    Associated Diagnoses: Benign intracranial hypertension      acetaminophen (TYLENOL) 650 MG suppository Place 1 suppository (650 mg) rectally every 6 hours as needed for mild pain or other (and adjunct with moderate or severe pain or per patient request)    Associated Diagnoses: Benign intracranial hypertension      acetaZOLAMIDE (DIAMOX SEQUEL) 500 MG 12 hr capsule Take 1 capsule (500 mg) by mouth every 12 hours    Associated Diagnoses: Benign intracranial hypertension      calcium carbonate (TUMS) 500 MG chewable tablet Take 1 tablet (500 mg) by mouth daily as needed for heartburn    Associated Diagnoses: Osteopenia, unspecified location      fentaNYL, PF, (SUBLIMAZE) 100 MCG/2ML injection Inject 0.5-1 mLs (25-50 mcg) into the vein every 5 minutes as needed for severe pain (If inadequate response may repeat 25 mcg IV slowly every 5 min PRN severe pain; when verbally requested by provider.)  Refills: 0    Associated Diagnoses: Benign intracranial hypertension      flumazenil (ROMAZICON) 0.5 MG/5ML injection Inject 2 mLs (0.2 mg) into the vein once for 1 dose    Associated Diagnoses: Benign intracranial hypertension      hydrALAZINE (APRESOLINE) 10 MG tablet Take 1 tablet (10 mg) by mouth 4 times daily as needed (for sbp > 170 or dbp > 100)    Associated Diagnoses: Benign intracranial hypertension      lidocaine (LMX4) 4 % external cream Apply topically every hour as needed for pain (with VAD insertion)    Associated Diagnoses: Benign intracranial hypertension      lidocaine 1 % SOLN 0.1-1 mLs by Other route every hour as needed (mild pain with VAD insertion)    Associated Diagnoses: Benign intracranial hypertension      melatonin 1 MG TABS tablet Take 1 tablet (1 mg) by mouth nightly as needed for sleep    Associated Diagnoses: Benign intracranial hypertension       methylPREDNISolone sodium succinate 250 mg Inject 250 mg into the vein every 6 hours    Associated Diagnoses: Benign intracranial hypertension      metoclopramide (REGLAN) 5 MG/ML injection Inject 2 mLs (10 mg) into the vein once for 1 dose  Qty: 2 mL, Refills: 0    Associated Diagnoses: Benign intracranial hypertension      midazolam (VERSED) 1 MG/ML injection Inject 0.5-2 mLs (0.5-2 mg) into the vein every 4 minutes as needed for sedation (If inadequate response may repeat 0.5 mg IV slowly every 4 minutes PRN sedation until desired response; when verbally requested by provider.)    Associated Diagnoses: Benign intracranial hypertension      !! naloxone (NARCAN) 0.4 MG/ML injection Inject 0.5 mLs (0.2 mg) into the vein once for 1 dose    Associated Diagnoses: Benign intracranial hypertension      !! naloxone (NARCAN) 0.4 MG/ML injection Inject 1 mL (0.4 mg) into the vein once for 1 dose    Associated Diagnoses: Benign intracranial hypertension      !! naloxone (NARCAN) 0.4 MG/ML injection Inject 0.5 mLs (0.2 mg) into the muscle once for 1 dose    Associated Diagnoses: Benign intracranial hypertension      !! naloxone (NARCAN) 0.4 MG/ML injection Inject 1 mL (0.4 mg) into the muscle once for 1 dose    Associated Diagnoses: Benign intracranial hypertension      ondansetron (ZOFRAN ODT) 4 MG ODT tab Take 1 tablet (4 mg) by mouth every 6 hours as needed for nausea or vomiting    Associated Diagnoses: Benign intracranial hypertension      ondansetron (ZOFRAN) 2 MG/ML SOLN injection Inject 2 mLs (4 mg) into the vein every 6 hours as needed for nausea or vomiting    Associated Diagnoses: Benign intracranial hypertension      oxyCODONE (ROXICODONE) 5 MG tablet Take 1 tablet (5 mg) by mouth every 6 hours as needed for moderate to severe pain  Refills: 0    Associated Diagnoses: Benign intracranial hypertension      !! senna-docusate (SENOKOT-S/PERICOLACE) 8.6-50 MG tablet Take 1 tablet by mouth 2 times daily    Associated  Diagnoses: Benign intracranial hypertension      !! senna-docusate (SENOKOT-S/PERICOLACE) 8.6-50 MG tablet Take 2 tablets by mouth 2 times daily    Associated Diagnoses: Benign intracranial hypertension      !! sodium chloride, PF, 0.9% PF flush 3 mLs by Intracatheter route every 8 hours    Associated Diagnoses: Benign intracranial hypertension      !! sodium chloride, PF, 0.9% PF flush 3 mLs by Intracatheter route once for 1 dose    Associated Diagnoses: Benign intracranial hypertension       !! - Potential duplicate medications found. Please discuss with provider.      CONTINUE these medications which have NOT CHANGED    Details   albuterol (PROAIR HFA/PROVENTIL HFA/VENTOLIN HFA) 108 (90 Base) MCG/ACT inhaler Inhale 2 puffs into the lungs every 6 hours as needed for shortness of breath / dyspnea or wheezing      hydrochlorothiazide (HYDRODIURIL) 25 MG tablet Take 25 mg by mouth daily      valsartan (DIOVAN) 80 MG tablet Take 1 tablet by mouth daily.  Qty: 90 tablet, Refills: 3    Associated Diagnoses: HTN (hypertension)         STOP taking these medications       aspirin 81 MG EC tablet Comments:   Reason for Stopping:         cetirizine (ZYRTEC) 10 MG tablet Comments:   Reason for Stopping:                 Rationale for medication changes:      Idiopathic intracranial hypertension    Patient's long term medication were not changed during this hospitalization.    Consults   Neurology      Discharge Procedure Orders   Follow Up and recommended labs and tests   Order Comments: Follow up with  physician  Accepting doctor is Dr Pancho Beckford     Reason for your hospital stay   Order Comments: Intracranial hypertension  Headache and vision changes     Additional Discharge Instructions   Order Comments: Transfer to  when available by ambulance  Keep NPO and bedrest     Full Code     Order Specific Question Answer Comments   Code status determined by: Discussion with patient/ legal decision maker      Examination      Vital Signs in last 24 hours:   vss    General appearance: Not in acute distress  HEENT: PERRL, EOMI  Neck: Supple, no JVD  Lungs: Clear breath sounds in bilateral lung fields  Cardiovascular: Regular rate and rhythm, normal S1-S2  Abdomen: Soft, non tender, no distension  Musculoskeletal: No joint swelling  On Lumbar drain  Skin: No rash and no edema  Neurology: Vision fields defect.        Please see EMR for more detailed significant labs, imaging, consultant notes etc.    Total time spent on discharge: 50 minutes    Cesario (Blu) MD Zeinab  [facility] Hospitalist Service: Ph:[phone]    CC:Clinic, [facility]3]        Latest Reference Range & Units 05/26/22 10:31 05/26/22 10:51   Enterovirus by PCR Negative    Negative   Human Herpes Virus 6 Negative    Negative   Human Parechovirus Negative    Negative   Varicella Zoster Virus Negative    Negative   VARICELLA ZOSTER VIRUS BY PCR   Rpt     RBC CSF 0 - 2 /uL 4 (H)     Appearance CSF Clear  Clear     Color CSF Colorless  Colorless     Tube Number   4     Glucose CSF 40 - 75 mg/dL 73     Protein Total CSF 15 - 45 mg/dL 23     LABORATORY MISCELLANEOUS ORDER   Rpt     [facility] MISCELLANEOUS TEST   Rpt     Cryptococcus neoformans/gattii Negative    Negative [1]   Cytomegalovirus Negative    Negative   Escherichia coli K1 Negative    Negative   Haemophilus influenzae Negative    Negative   Herpes Simplex Virus 1 Negative    Negative [2]   HERPES SIMPLEX VIRUS 1&2 PCR     Rpt   Herpes Simplex Virus 2 Negative    Negative [3]   Listeria monocytogenes Negative    Negative   Neisseria meningitidis Negative    Negative   Performing Laboratory   [facility]     Streptococcus agalactiae (GBS) Negative    Negative   Streptococcus pneumoniae Negative    Negative   (H): Data is abnormally high  Rpt: View report in Results Review for more information  [1] Recommend testing for Cryptococcal antigen and fungal culture if clinical suspicion for infection is high.  [2]  Recommend testing with another molecular method if clinical suspicion for infection is high.  [3] Recommend testing with another molecular method if clinical suspicion for infection is high.  Medications  Scheduled Meds:    acetaZOLAMIDE  500 mg Oral Q12H RAE     acyclovir (ZOVIRAX) IV  10 mg/kg (Adjusted) Intravenous Q8H     ampicillin  2 g Intravenous Q4H     cefTRIAXone  2 g Intravenous Q12H     doxycycline hyclate  100 mg Oral Q12H RAE     hydrochlorothiazide  25 mg Oral Daily     methylPREDNISolone  250 mg Intravenous Q6H     metoclopramide  10 mg Intravenous Once     senna-docusate  1 tablet Oral BID     Or     senna-docusate  2 tablet Oral BID     sodium chloride (PF)  3 mL Intracatheter Q8H     valsartan  80 mg Oral Daily     vancomycin  1,750 mg Intravenous Q12H      Continuous Infusions:    sodium chloride 100 mL/hr at 05/27/22 0200      [facility] Reference Range & Units 05/26/22 08:32   Sodium 136 - 145 mmol/L 138   Potassium 3.5 - 5.0 mmol/L 3.7   Chloride 98 - 107 mmol/L 106   Carbon Dioxide 22 - 31 mmol/L 24   Urea Nitrogen 8 - 22 mg/dL 11   Creatinine 0.60 - 1.10 mg/dL 0.69   GFR Estimate >60 mL/min/1.73m2 >90 [1]   Calcium 8.5 - 10.5 mg/dL 8.9   Anion Gap 5 - 18 mmol/L 8   CRP 0.0 - 0.8 mg/dL 7.1 (H)   Procalcitonin 0.00 - 0.49 ng/mL 0.02 [2]   Glucose 70 - 125 mg/dL 140 (H)   WBC 4.0 - 11.0 10e3/uL 5.6   Hemoglobin 11.7 - 15.7 g/dL 12.5   Hematocrit 35.0 - 47.0 % 36.3   Platelet Count 150 - 450 10e3/uL 397   RBC Count 3.80 - 5.20 10e6/uL 4.15   MCV 78 - 100 fL 88   MCH 26.5 - 33.0 pg 30.1   MCHC 31.5 - 36.5 g/dL 34.4   RDW 10.0 - 15.0 % 11.8   % Neutrophils % 83   % Lymphocytes % 14   % Monocytes % 2   (H): Data is abnormally high

## 2022-05-27 NOTE — PROGRESS NOTES
Hospitalist Progress Note  ADMIT DATE: 5/25/2022     FACILITY: [facility]    PCP: Shaq, Thea Rogel, [phone]    Assessment/Plan    Parul Veliz is a 61 year old female admitted on 5/25/2022. She has been having around moderate intensity headache since about a week. Since  5/24 around 1200 she started to have vision changes - and has not been able to see the part of the finger with either eye ( when one eye is closed ) but can do finger counting. No focal weakness, confusion, speech changes, facial droop. She also spiked a fever of 102 in ER. She had been in the [facility] from last Wed to Sunday. Neurology consulted and concern for meningitis/Intracranial Hypertension and recommend LP 5/26. Blood cultures sent, started empiric iv ampicillin, vancomycin, acyclovir, ceftriaxone.       A/p :         Idiopathic intracranial hypertension with vision impairment     She has been having around moderate intensity headache since about a week.      Since 5/24 around 1200 she started to have vision changes - and has not been able to see the part of the finger with either eye ( when one eye is closed ) but can do finger counting.      No focal weakness, confusion, speech changes, facial droop.      She also spiked a fever of 102 in ER.      She had been in the [facility] from last Wed to Sunday.      MRI of the brain : Nonspecific scattered small foci of T2 FLAIR hyperintensity in the cerebral white matter can be seen in setting of chronic small vessel ischemic changes, prior trauma or insult, or migraines. Demyelination is not favored. Severe stenosis of the distal transverse sinuses bilaterally, which is nonspecific but can be seen in setting of idiopathic intracranial hypertension.      Neurology consulted and concern for meningitis/Benign intracranial hypertension and recommend LP and drianing. Done 5/26. Now on lumbar drain in  ICU.  -still headache (although better) and vision changes (no change)    -CSF results so far negative for infection. Discussed with neurology. OK to discontinue antibiotics.  -LP opening pressure 31 cm H2O  - Diamox 500mg bid  -Methylprednisone 250 mg QID for 5 days then oral taper  -Contact  ophthalmologist for transfer for optical nerve fenestration if accepted. Await reply.  -Consult neurosurgeon for option of  shunt       Asthma, exercise induced : on albuterol prn       HTN, Essential : on valsartan 80 mg daily       H/o PE post hysterectomy - 2008 : on baby aspirin       Breast cancer b/l - -2014, s/p mastectomy b/l         Diet: Regular Diet Adult    DVT Prophylaxis: Pneumatic Compression Devices  Rodriguez Catheter: Not present  Central Lines: None  Cardiac Monitoring: None  Code Status:  full     Barriers to Discharge:  S/p LP. On CSF draining;  to accept?     Anticipated discharge date/Disposition: to be determined        Subjective  Feeling better after LP. But still some headache. Vision impairment not improving    Objective    Vital signs in last 24 hours  Temp:  [97.8  F (36.6  C)-98.2  F (36.8  C)] 98.2  F (36.8  C)  Pulse:  [68-97] 72  Resp:  [15-30] 15  BP: (100-155)/(50-87) 115/59  SpO2:  [89 %-96 %] 95 % [unfilled] O2 Device: Nasal cannula    Weight:   [unfilled] Weight change: 7.212 kg (15 lb 14.4 oz)    Intake/Output last 3 shifts  I/O last 3 completed shifts:  In: 3560 [P.O.:720; I.V.:2840]  Out: 3780 [Urine:3750; Drains:30]  Body mass index is 44.15 kg/m .    Physical Exam    Physical Exam  General appearance: not in acute distress. Obese.   HEENT: PERRL, EOMI  Lungs: Clear breath sounds in bilateral lung fields  Cardiovascular: Regular rate and rhythm, normal S1-S2  Abdomen: Soft, non tender, no distension, normal bowel sound  Musculoskeletal: No joint swelling  Skin: No rash and no edema  Neurology: Vision impaired      Pertinent Labs   Lab Results: personally reviewed.      Latest  Reference Range & Units 05/26/22 10:31 05/26/22 10:51   Enterovirus by PCR Negative   Negative   Human Herpes Virus 6 Negative   Negative   Human Parechovirus Negative   Negative   Varicella Zoster Virus Negative   Negative   VARICELLA ZOSTER VIRUS BY PCR  Rpt    RBC CSF 0 - 2 /uL 4 (H)    Appearance CSF Clear  Clear    Color CSF Colorless  Colorless    Tube Number  4    Glucose CSF 40 - 75 mg/dL 73    Protein Total CSF 15 - 45 mg/dL 23    LABORATORY MISCELLANEOUS ORDER  Rpt    [facility] MISCELLANEOUS TEST  Rpt    Cryptococcus neoformans/gattii Negative   Negative [1]   Cytomegalovirus Negative   Negative   Escherichia coli K1 Negative   Negative   Haemophilus influenzae Negative   Negative   Herpes Simplex Virus 1 Negative   Negative [2]   HERPES SIMPLEX VIRUS 1&2 PCR   Rpt   Herpes Simplex Virus 2 Negative   Negative [3]   Listeria monocytogenes Negative   Negative   Neisseria meningitidis Negative   Negative   Performing Laboratory  [facility]    Streptococcus agalactiae (GBS) Negative   Negative   Streptococcus pneumoniae Negative   Negative   (H): Data is abnormally high  Rpt: View report in Results Review for more information  [1] Recommend testing for Cryptococcal antigen and fungal culture if clinical suspicion for infection is high.  [2] Recommend testing with another molecular method if clinical suspicion for infection is high.  [3] Recommend testing with another molecular method if clinical suspicion for infection is high.  Medications  Scheduled Meds:    acetaZOLAMIDE  500 mg Oral Q12H RAE     acyclovir (ZOVIRAX) IV  10 mg/kg (Adjusted) Intravenous Q8H     ampicillin  2 g Intravenous Q4H     cefTRIAXone  2 g Intravenous Q12H     doxycycline hyclate  100 mg Oral Q12H RAE     hydrochlorothiazide  25 mg Oral Daily     methylPREDNISolone  250 mg Intravenous Q6H     metoclopramide  10 mg Intravenous Once     senna-docusate  1 tablet Oral BID    Or     senna-docusate  2 tablet Oral BID     sodium chloride  (PF)  3 mL Intracatheter Q8H     valsartan  80 mg Oral Daily     vancomycin  1,750 mg Intravenous Q12H     Continuous Infusions:    sodium chloride 100 mL/hr at 05/27/22 0200     PRN Meds:.acetaminophen **OR** acetaminophen, albuterol, calcium carbonate, fentaNYL, flumazenil, hydrALAZINE, lidocaine 4%, lidocaine (buffered or not buffered), melatonin, midazolam, naloxone **OR** naloxone **OR** naloxone **OR** naloxone, ondansetron **OR** ondansetron, oxyCODONE, sodium chloride (PF)      Advanced Care Planning:  Discharge planning discussed with patient, neurology, nurses        [facility] Medicine Service  Cesario Arriola

## 2022-05-27 NOTE — PROGRESS NOTES
neurosurgeon Dr Pancho Beckford called me back and accepted the patient to be transferred to  when bed available ASAP.

## 2022-05-27 NOTE — PROGRESS NOTES
Care Management Follow Up    Length of Stay (days): 2    Expected Discharge Date: 05/30/2022    Concerns to be Addressed:   Benign intracranial hypertension, lumbar drain in place. IV Antibiotics (Acyclovir, Ampicillin, Rocephin and Vanco) pending cultures, ID following. IV Methylprednisolone. IV fluid support.   Patient plan of care discussed at interdisciplinary rounds: Yes  Follow up from rounds/notes:   Transferring to the [facility].     Anticipated Discharge Disposition:  Discharge goal is home pending response to treatment, medical needs and mobility closer to discharge.      Anticipated Discharge Services:  To be determined by destination, patient/family preferences, medical needs and mobility status closer to the time of discharge.  Anticipated Discharge DME:  Per therapy (if indicated).    Patient/family educated on Medicare website which has current facility and service quality ratings:  NA  Education Provided on the Discharge Plan:  Per team  Patient/Family in Agreement with the Plan:  Yes    Referrals Placed by CM/SW:  None.   Private pay costs discussed: Not applicable     Additional Information:  Patient lives in her house with her dad and is independent with all activities of daily living at [facility]. He is mostly independent but needs occasional assist. She ambulates without devices, drives and works full time. Her stated discharge goal is to return home and her mom plans to transport. CM will continue to monitor progression of care, review team recommendations and provide discharge planning assist as needed.      Edith Butler RN

## 2022-05-27 NOTE — PROGRESS NOTES
Patient will be transferring to [facility] room 4213 at 1930 report given to receiving KEVIN Dotson.

## 2022-05-27 NOTE — PLAN OF CARE
Problem: Plan of Care - These are the overarching goals to be used throughout the patient stay.    Goal: Plan of Care Review/Shift Note  Description: The Plan of Care Review/Shift note should be completed every shift.  The Outcome Evaluation is a brief statement about your assessment that the patient is improving, declining, or no change.  This information will be displayed automatically on your shift note.  Outcome: Ongoing, Progressing     Problem: Risk for Delirium  Goal: Optimal Coping  Outcome: Ongoing, Progressing  Intervention: Optimize Psychosocial Adjustment to Delirium  Recent Flowsheet Documentation  Taken 5/27/2022 1201 by Faiza Kay, RN  Family/Support System Care: support provided  Taken 5/27/2022 0800 by Faiza Kay, RN  Family/Support System Care: support provided   Goal Outcome Evaluation:        Patient alert and oriented. Vitals stable. C/o head ache 2/10 PRN Tylenol was given with effective result. No change noted in the central blurred vision. 10 ml drained from lumbar drain. Will continue to monitor.

## 2022-05-27 NOTE — PROGRESS NOTES
"NEUROLOGY INPATIENT PROGRESS NOTE       [facility] NEUROLOGY [place]  [address] [place] [address]  Tel: [phone]  Fax: [phone]  [email or web address]     ASSESSMENT & PLAN   Date: 05/27/2022  Hospital Day#: 2  Visit diagnosis: Benign intracranial hypertension    Benign intracranial hypertension  61-year-old morbidly obese female with history of HTN, breast cancer, s/p mastectomy, depression, RACHID was admitted with progressively worsening headache with visual symptom.  Her history and exam suggest a diagnosis of benign intracranial hypertension    Lumbar puncture done under fluoroscopy showed opening pressure of 31 cm H2O.  32 mL of clear CSF collected.  Lumbar drain placed at L2-L3.    CSF preliminary results shows normal cell count, protein, glucose, gram stain, bacteriology, meningitis/encephalitis panel.  HSV, HIV CD, Lyme pending    Patient started on Diamox 500 mg twice daily and IV methylprednisolone 250 mg 4 times daily for 5 days followed by an oral taper    Patient has noticed improvement with the lumbar drain.  We will clamp lumbar drain after 1 PM  for the next 12 hours & discontinue early tomorrow morning    Patient counseled to lose at least 10% of body weight    Patient continues to have visual symptoms that she describes as \"black holes\" when she closes either eye that I suspect are enlarged blind spots.  We will contact [facility] ophthalmology for possible transfer for optic nerve fenestration.  Other option is ventriculoperitoneal shunt.  Discussed with hospitalist who will contact ophthalmology at [facility]    Vitamin A and angiotensin-converting enzyme pending    Patient started on antibiotics and acyclovir but so far CSF looks clear.    Neurology Discharge Planning :   ROX Joseph MD  [facility]  (Formerly, [facility]     PROBLEM LIST      Patient Active Problem " "List   Diagnosis Code     Pulmonary embolism and infarction (H) I26.99     HTN (hypertension) I10     Seasonal allergies J30.2     CARDIOVASCULAR SCREENING; LDL GOAL LESS THAN 130 Z13.6     Obesity E66.9     Smoker F17.200     Health Care Home Z76.89     Infiltrating ductal carcinoma of LEFT breast, BX 10/9/12 C50.919     Allergic rhinitis J30.9     Asthma J45.909     Cervical cancer (H) C53.9     Depressive disorder F32.9     History of breast cancer in female Z85.3     History of pulmonary embolus (PE) Z86.711     Hypercoagulable state (H) D68.59     Malignant neoplasm of female breast (H) C50.919     RACHID (obstructive sleep apnea) G47.33     Osteopenia M85.80     Polyp of colon K63.5     Pulmonary embolism (H) I26.99     S/P laparoscopic cholecystectomy Z90.49     S/P SANDRA (total abdominal hysterectomy) Z90.710     Vitamin D deficiency E55.9     Headache on top of head R51.9     Benign intracranial hypertension G93.2     Past Medical History:   Patient  has a past medical history of Asthma, mild intermittent, cervical cancer (2/2008), Genetic predisposition to breast cancer, Hemangioma of skin, HTN (hypertension), Infiltrating ductal carcinoma of LEFT breast, BX 10/9/12 (10/15/2012), Lumbar disc disease, Nephrolithiasis, Obesity, RACHID (obstructive sleep apnea), PE (pulmonary embolism) (2008), and Pericardial cyst.     SUBJECTIVE     Patient doing well, feels lumbar puncture has helped her headache.  Vision is about the same.  She reports \"black holes\" in her eyes when she closes either eye.  Does admit headaches have improved     OBJECTIVE     Vital signs in last 24 hours  Temp:  [97.8  F (36.6  C)-98.2  F (36.8  C)] 98.2  F (36.8  C)  Pulse:  [68-97] 72  Resp:  [15-30] 15  BP: (100-155)/(50-87) 115/59  SpO2:  [89 %-96 %] 95 %    Weight:   Wt Readings from Last 1 Encounters:   05/26/22 125.1 kg (275 lb 14.4 oz)       I/O last 24 hours  Intake/Output Summary (Last 24 hours) at 5/27/2022 2068  Last data filed at " 5/27/2022 0400  Gross per 24 hour   Intake 3560 ml   Output 3420 ml   Net 140 ml     Review of Systems   Pertinent items are noted in HPI.    General Physical Exam: Patient is alert and oriented x 3. Vital signs were reviewed and are documented in EMR. Neck was supple, no Carotid bruit, thyromegaly, JVD or lymphadenopathy noted.  Neurological Exam:  Mental status: Alert and oriented x3 no acute distress  Speech: Normal with no dysarthria or aphasia  Cranial nerves:  Disc margins are blurred with papilledema.  Rest of the cranial nerves are intact  Motor: Normal mass and tone with 5/5 strength  Reflexes: 1+ with downgoing toes  Sensory: Intact light touch pinprick  Coordination: Intact finger-nose testing heel-knee-shin  Gait: Deferred for safety     DIAGNOSTIC STUDIES     Pertinent Radiology   Following imaging studies were reviewed:    MRI  HEAD MRI:   1.  No acute intracranial abnormality  2.  Nonspecific scattered small foci of T2 FLAIR hyperintensity in the cerebral white matter can be seen in setting of chronic small vessel ischemic changes, prior trauma or insult, or migraines. Demyelination is not favored.     HEAD MRA:   1.  Normal MRA [place].     NECK MRA:  1.  Normal neck MRA.     HEAD MRV:  1.  Severe stenosis of the distal transverse sinuses bilaterally, which is nonspecific but can be seen in setting of idiopathic intracranial hypertension.  2.  No additional cervical stenosis or occlusion. No thrombosis.    Pertinent Labs   Lab Results: Personally Reviewed  Recent Results (from the past 24 hour(s))   Basic metabolic panel    Collection Time: 05/26/22  8:32 AM   Result Value Ref Range    Sodium 138 136 - 145 mmol/L    Potassium 3.7 3.5 - 5.0 mmol/L    Chloride 106 98 - 107 mmol/L    Carbon Dioxide (CO2) 24 22 - 31 mmol/L    Anion Gap 8 5 - 18 mmol/L    Urea Nitrogen 11 8 - 22 mg/dL    Creatinine 0.69 0.60 - 1.10 mg/dL    Calcium 8.9 8.5 - 10.5 mg/dL    Glucose 140 (H) 70 - 125 mg/dL    GFR  Estimate >90 >60 mL/min/1.73m2   CRP inflammation    Collection Time: 05/26/22  8:32 AM   Result Value Ref Range    CRP 7.1 (H) 0.0 - 0.8 mg/dL   Procalcitonin    Collection Time: 05/26/22  8:32 AM   Result Value Ref Range    Procalcitonin 0.02 0.00 - 0.49 ng/mL   CBC with platelets and differential    Collection Time: 05/26/22  8:32 AM   Result Value Ref Range    WBC Count 5.6 4.0 - 11.0 10e3/uL    RBC Count 4.15 3.80 - 5.20 10e6/uL    Hemoglobin 12.5 11.7 - 15.7 g/dL    Hematocrit 36.3 35.0 - 47.0 %    MCV 88 78 - 100 fL    MCH 30.1 26.5 - 33.0 pg    MCHC 34.4 31.5 - 36.5 g/dL    RDW 11.8 10.0 - 15.0 %    Platelet Count 397 150 - 450 10e3/uL    % Neutrophils 83 %    % Lymphocytes 14 %    % Monocytes 2 %    % Eosinophils 0 %    % Basophils 0 %    % Immature Granulocytes 1 %    NRBCs per 100 WBC 0 <1 /100    Absolute Neutrophils 4.7 1.6 - 8.3 10e3/uL    Absolute Lymphocytes 0.8 0.8 - 5.3 10e3/uL    Absolute Monocytes 0.1 0.0 - 1.3 10e3/uL    Absolute Eosinophils 0.0 0.0 - 0.7 10e3/uL    Absolute Basophils 0.0 0.0 - 0.2 10e3/uL    Absolute Immature Granulocytes 0.1 <=0.4 10e3/uL    Absolute NRBCs 0.0 10e3/uL   Glucose CSF:    Collection Time: 05/26/22 10:31 AM   Result Value Ref Range    Glucose CSF 73 40 - 75 mg/dL   Protein total CSF:    Collection Time: 05/26/22 10:31 AM   Result Value Ref Range    Protein total CSF 23 15 - 45 mg/dL   Gram stain    Collection Time: 05/26/22 10:31 AM    Specimen: Lumbar Puncture; Cerebrospinal fluid   Result Value Ref Range    Gram Stain Result     Cerebrospinal fluid Aerobic Bacterial Culture Routine    Collection Time: 05/26/22 10:31 AM    Specimen: Lumbar Puncture; Cerebrospinal fluid   Result Value Ref Range    Gram Stain Result No organisms seen     Gram Stain Result 3+ WBC seen    [facility] [employer]; [address] Lactic Acid CSF (Laboratory Miscellaneous Order)    Collection Time: 05/26/22 10:31 AM   Result Value Ref Range    See Scanned Result       Specimen received. Reordered and  sent to performing laboratory. Report to follow up on completion.     Performing Laboratory [facility]     Test Name Lactic Acid, CSF     Test Code [id number]    Cell Count CSF    Collection Time: 05/26/22 10:31 AM   Result Value Ref Range    Tube Number 4     Color Colorless Colorless    Clarity Clear Clear    Total Nucleated Cells 2 0 - 5 /uL    RBC Count 4 (H) 0 - 2 /uL   Meningitis/Encephalitis Panel Qual PCR CSF:    Collection Time: 05/26/22 10:51 AM   Result Value Ref Range    Escherichia coli K1 Negative Negative    Haemophilus influenzae Negative Negative    Listeria monocytogenes Negative Negative    Neisseria meningitidis Negative Negative    Streptococcus agalactiae (GBS) Negative Negative    Streptococcus pneumoniae Negative Negative    Cytomegalovirus Negative Negative    Enterovirus Negative Negative    Herpes simplex virus 1 Negative Negative    Herpes simplex virus 2 Negative Negative    Human Herpes Virus 6 Negative Negative    Human parechovirus Negative Negative    Varicella zoster virus Negative Negative    Cryptococcus neoformans/gattii Negative Negative         HOSPITAL MEDICATIONS       acetaZOLAMIDE  500 mg Oral Q12H RAE     acyclovir (ZOVIRAX) IV  10 mg/kg (Adjusted) Intravenous Q8H     ampicillin  2 g Intravenous Q4H     cefTRIAXone  2 g Intravenous Q12H     doxycycline hyclate  100 mg Oral Q12H RAE     hydrochlorothiazide  25 mg Oral Daily     methylPREDNISolone  250 mg Intravenous Q6H     metoclopramide  10 mg Intravenous Once     senna-docusate  1 tablet Oral BID    Or     senna-docusate  2 tablet Oral BID     sodium chloride (PF)  3 mL Intracatheter Q8H     valsartan  80 mg Oral Daily     vancomycin  1,750 mg Intravenous Q12H        Total time spent for face to face visit, reviewing labs/imaging studies, counseling and coordination of care was: 30 Minutes More than 50% of this time was spent on counseling and coordination of care.        This note was dictated using voice  recognition software.  Any grammatical or context distortions are unintentional and inherent to the software.

## 2022-05-28 LAB
ANION GAP SERPL CALCULATED.3IONS-SCNC: 8 MMOL/L (ref 3–14)
B BURGDOR AB CSF IA-ACNC: 0.03 LIV
B BURGDOR IGG CSF QL IB: NEGATIVE
B BURGDOR IGM CSF QL IB: NEGATIVE
BUN SERPL-MCNC: 19 MG/DL (ref 7–30)
CALCIUM SERPL-MCNC: 9.1 MG/DL (ref 8.5–10.1)
CHLORIDE BLD-SCNC: 115 MMOL/L (ref 94–109)
CO2 SERPL-SCNC: 18 MMOL/L (ref 20–32)
CREAT SERPL-MCNC: 0.67 MG/DL (ref 0.52–1.04)
ERYTHROCYTE [DISTWIDTH] IN BLOOD BY AUTOMATED COUNT: 12.1 % (ref 10–15)
GFR SERPL CREATININE-BSD FRML MDRD: >90 ML/MIN/1.73M2
GLUCOSE BLD-MCNC: 123 MG/DL (ref 70–99)
HCT VFR BLD AUTO: 37.7 % (ref 35–47)
HGB BLD-MCNC: 12.4 G/DL (ref 11.7–15.7)
HIV 1+2 AB+HIV1P24 AG SERPLBLD IA.RAPID: NON REACTIVE
HIV 1+2 AB+HIV1P24 AG SERPLBLD IA.RAPID: NON REACTIVE
HIV INTERPRETATION: NORMAL
MAGNESIUM SERPL-MCNC: 2.4 MG/DL (ref 1.6–2.3)
MCH RBC QN AUTO: 29.6 PG (ref 26.5–33)
MCHC RBC AUTO-ENTMCNC: 32.9 G/DL (ref 31.5–36.5)
MCV RBC AUTO: 90 FL (ref 78–100)
PHOSPHATE SERPL-MCNC: 2.8 MG/DL (ref 2.5–4.5)
PLATELET # BLD AUTO: 462 10E3/UL (ref 150–450)
POTASSIUM BLD-SCNC: 3.3 MMOL/L (ref 3.4–5.3)
POTASSIUM BLD-SCNC: 3.5 MMOL/L (ref 3.4–5.3)
RBC # BLD AUTO: 4.19 10E6/UL (ref 3.8–5.2)
SODIUM SERPL-SCNC: 141 MMOL/L (ref 133–144)
WBC # BLD AUTO: 17.8 10E3/UL (ref 4–11)

## 2022-05-28 PROCEDURE — 83735 ASSAY OF MAGNESIUM: CPT | Performed by: STUDENT IN AN ORGANIZED HEALTH CARE EDUCATION/TRAINING PROGRAM

## 2022-05-28 PROCEDURE — 84100 ASSAY OF PHOSPHORUS: CPT | Performed by: NEUROLOGICAL SURGERY

## 2022-05-28 PROCEDURE — 85027 COMPLETE CBC AUTOMATED: CPT | Performed by: STUDENT IN AN ORGANIZED HEALTH CARE EDUCATION/TRAINING PROGRAM

## 2022-05-28 PROCEDURE — 84100 ASSAY OF PHOSPHORUS: CPT | Performed by: STUDENT IN AN ORGANIZED HEALTH CARE EDUCATION/TRAINING PROGRAM

## 2022-05-28 PROCEDURE — 84132 ASSAY OF SERUM POTASSIUM: CPT | Performed by: STUDENT IN AN ORGANIZED HEALTH CARE EDUCATION/TRAINING PROGRAM

## 2022-05-28 PROCEDURE — 200N000002 HC R&B ICU UMMC

## 2022-05-28 PROCEDURE — 87806 HIV AG W/HIV1&2 ANTB W/OPTIC: CPT | Performed by: STUDENT IN AN ORGANIZED HEALTH CARE EDUCATION/TRAINING PROGRAM

## 2022-05-28 PROCEDURE — 250N000013 HC RX MED GY IP 250 OP 250 PS 637: Performed by: STUDENT IN AN ORGANIZED HEALTH CARE EDUCATION/TRAINING PROGRAM

## 2022-05-28 PROCEDURE — 36415 COLL VENOUS BLD VENIPUNCTURE: CPT | Performed by: STUDENT IN AN ORGANIZED HEALTH CARE EDUCATION/TRAINING PROGRAM

## 2022-05-28 PROCEDURE — 80048 BASIC METABOLIC PNL TOTAL CA: CPT | Performed by: STUDENT IN AN ORGANIZED HEALTH CARE EDUCATION/TRAINING PROGRAM

## 2022-05-28 PROCEDURE — 86780 TREPONEMA PALLIDUM: CPT | Performed by: STUDENT IN AN ORGANIZED HEALTH CARE EDUCATION/TRAINING PROGRAM

## 2022-05-28 PROCEDURE — 86481 TB AG RESPONSE T-CELL SUSP: CPT | Performed by: STUDENT IN AN ORGANIZED HEALTH CARE EDUCATION/TRAINING PROGRAM

## 2022-05-28 RX ORDER — POTASSIUM CHLORIDE 750 MG/1
40 TABLET, EXTENDED RELEASE ORAL ONCE
Status: COMPLETED | OUTPATIENT
Start: 2022-05-28 | End: 2022-05-28

## 2022-05-28 RX ADMIN — POTASSIUM & SODIUM PHOSPHATES POWDER PACK 280-160-250 MG 1 PACKET: 280-160-250 PACK at 20:13

## 2022-05-28 RX ADMIN — POTASSIUM CHLORIDE 40 MEQ: 750 TABLET, EXTENDED RELEASE ORAL at 06:19

## 2022-05-28 RX ADMIN — VALSARTAN 80 MG: 80 TABLET, FILM COATED ORAL at 07:42

## 2022-05-28 RX ADMIN — ACETAZOLAMIDE 500 MG: 500 CAPSULE, EXTENDED RELEASE ORAL at 10:17

## 2022-05-28 RX ADMIN — HYDROCHLOROTHIAZIDE 25 MG: 25 TABLET ORAL at 07:38

## 2022-05-28 RX ADMIN — POTASSIUM CHLORIDE 20 MEQ: 750 TABLET, EXTENDED RELEASE ORAL at 00:07

## 2022-05-28 RX ADMIN — ACETAZOLAMIDE 500 MG: 500 CAPSULE, EXTENDED RELEASE ORAL at 22:04

## 2022-05-28 RX ADMIN — POTASSIUM & SODIUM PHOSPHATES POWDER PACK 280-160-250 MG 1 PACKET: 280-160-250 PACK at 07:39

## 2022-05-28 ASSESSMENT — ACTIVITIES OF DAILY LIVING (ADL)
ADLS_ACUITY_SCORE: 20
ADLS_ACUITY_SCORE: 20
ADLS_ACUITY_SCORE: 21
ADLS_ACUITY_SCORE: 23
ADLS_ACUITY_SCORE: 23
ADLS_ACUITY_SCORE: 20
ADLS_ACUITY_SCORE: 20
ADLS_ACUITY_SCORE: 21
ADLS_ACUITY_SCORE: 23
ADLS_ACUITY_SCORE: 21
ADLS_ACUITY_SCORE: 20
ADLS_ACUITY_SCORE: 23

## 2022-05-28 ASSESSMENT — VISUAL ACUITY
OS: OTHER (SEE COMMENTS)
OD: OTHER (SEE COMMENTS)
OS: OTHER (SEE COMMENTS)
OD: OTHER (SEE COMMENTS)
OU: GLASSES
OD: OTHER (SEE COMMENTS)
OS: OTHER (SEE COMMENTS)
OD: OTHER (SEE COMMENTS)
OD: OTHER (SEE COMMENTS)

## 2022-05-28 NOTE — PLAN OF CARE
Major Shift Events:  Continue to drain 10 ml of CSF from lumber drain/ hour. Intact otherwise with mild blurred vision. BM x1, walked in halls X3.  Plan: Transfer to  when space is available. Wait for opthalmology to weigh in and dictate point of care   For vital signs and complete assessments, please see documentation flowsheets.

## 2022-05-28 NOTE — PHARMACY-ADMISSION MEDICATION HISTORY
Admission Medication History Completed by Pharmacy    See [facility] Admission Navigator for allergy information, preferred outpatient pharmacy, prior to admission medications and immunization status.     Medication History Sources:     Note completed at [facility] 5/25    Changes made to PTA medication list (reason):    Added: Aspirin, cetirizine    Deleted: acetaminophen, fentanyl, flumazenil, lidocaine topical, melatonin, methylprednisone, metoclopramide, midazolam, naloxone, ondansetron, oxycodone, senna, calcium carbonate, acetazolamide (all medications were given in hospital prior to transfer but were not taken by patient before admission)    Changed: None    Additional Information:    None    Prior to Admission medications    Medication Sig Last Dose Taking? Auth Provider   albuterol (PROAIR HFA/PROVENTIL HFA/VENTOLIN HFA) 108 (90 Base) MCG/ACT inhaler Inhale 2 puffs into the lungs every 6 hours as needed for shortness of breath / dyspnea or wheezing Unknown at Unknown time Yes Unknown, Entered By History   aspirin 81 MG EC tablet Take 81 mg by mouth daily 5/25/2022 Yes Unknown, Entered By History   cetirizine (ZYRTEC) 10 MG tablet Take 10 mg by mouth daily 5/25/2022 Yes Unknown, Entered By History   hydrochlorothiazide (HYDRODIURIL) 25 MG tablet Take 25 mg by mouth daily 5/25/2022  Unknown, Entered By History   valsartan (DIOVAN) 80 MG tablet Take 1 tablet by mouth daily. 5/25/2022  Brice Dean MD       Date completed: 05/27/22    Medication history completed by: Zach Quinn [facility]

## 2022-05-28 NOTE — PROGRESS NOTES
[facility], [place]   Neurosurgery Progress Note:    Assessment: Parul Veliz is a 61 year old female on PTA ASA 81 with PMH HTN, asthma, and chronic headache who presented to OSH after 1 week of headaches that progressed to blurry vision starting on 5/24.  Given the persistence of blurry vision, she presented to OSH on 5/25.  She underwent LP at OSH with opening pressure 36 and then lumbar drain placement for IIH.  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  She was also started on antibiotics given one-time fever, and CSF was obtained.  She was transferred to [facility] for Ophthalmology evaluation on 5/27, after her blurry vision did not improve.  Since arriving, lumbar drain drainage has continued at 10 mL/hr.  She has remained neurologically intact aside from her central scotoma.  Per Ophthalmology evaluation, she does not demonstrate papilledema that would explain her blurry vision and vision changes are likely unrelated to intracranial hypertension.     Plan:  - Lumbar drain 10 mL/hr - will clamp today  - Appreciate Ophthalmology recommendations  - Continue Diamox  - Regular diet  - Up with assist  - SBP <140  - Likely discharge tomorrow    -----------------------------------  bAbey Carrillo MD  Neurosurgery PGY2    Please contact neurosurgery resident on call with questions.    Dial * * [phone], enter [phone] when prompted.      Interval History/Subjective: Floor status, HIV negative. Pending TB and syphilis screening tests.     Objective:   Temp:  [97.2  F (36.2  C)-98.9  F (37.2  C)] 98.9  F (37.2  C)  Pulse:  [56-68] 68  Resp:  [16-20] 16  BP: (105-149)/(60-87) 149/87  SpO2:  [94 %-100 %] 94 %  I/O last 3 completed shifts:  In: 1200 [P.O.:1200]  Out: 240 [Drains:240]    Gen: Appears comfortable, NAD  Wound: Lumbar drain dressing intact  Neurologic:  - Alert & Oriented to person, place, time, and situation  - Follows commands briskly  - Speech fluent,  spontaneous. No aphasia or dysarthria.  - No gaze preference. No apparent hemineglect.  Bilateral central scotoma.  - PERRL, EOMI  - Strong brow raise, eye closure, and smile  - Face symmetric with sensation intact to light touch  - Trapezii and sternocleidomastoid muscles 5/5 bilaterally  - No pronator drift     Del Tr Bi WE WF Gr   R 5 5 5 5 5 5   L 5 5 5 5 5 5    HF KE KF DF PF EHL   R 5 5 5 5 5 5   L 5 5 5 5 5 5     Sensation intact and symmetric to light touch throughout    LABS  Recent Labs   Lab Test 05/29/22  0759 05/28/22  0458 05/26/22  0832   WBC 9.1 17.8* 5.6   HGB 13.2 12.4 12.5   MCV 91 90 88    462* 397       Recent Labs   Lab Test 05/29/22 0759 05/28/22  1009 05/28/22 0458 05/27/22 2032 05/26/22  0832     --  141  --  138   POTASSIUM 3.2* 3.5 3.3*   < > 3.7   CHLORIDE 112*  --  115*  --  106   CO2 20  --  18*  --  24   BUN 23  --  19  --  11   CR 0.87  --  0.67  --  0.69   ANIONGAP 9  --  8  --  8   JOE 8.8  --  9.1  --  8.9   *  --  123*  --  140*    < > = values in this interval not displayed.       IMAGING  No results found for this or any previous visit (from the past 24 hour(s)).

## 2022-05-28 NOTE — PROGRESS NOTES
[facility], [place]   Neurosurgery Progress Note:    Assessment: Parul Veliz is a 61 year old female on PTA ASA 81 with PMH HTN, asthma, and chronic headache who presented to OSH after 1 week of headaches that progressed to blurry vision starting on 5/24.  Given the persistence of blurry vision, she presented to OSH on 5/25.  She underwent LP at OSH with opening pressure 36 and then lumbar drain placement for IIH.  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  She was also started on antibiotics given one-time fever, and CSF was obtained.  She was transferred to [facility] for Ophthalmology evaluation on 5/27, after her blurry vision did not improve.  Since arriving, lumbar drain drainage has continued at 10 mL/hr.  She has remained neurologically intact aside from her central scotoma.  Per Ophthalmology evaluation, she does not demonstrate papilledema that would explain her blurry vision.  We are tentatively planning for Neuro IR involvement for stenting of the transverse sinuses.    Plan:  - Lumbar drain 10 mL/hr  - Will discuss case with Neuro IR when appropriate  - Appreciate Ophthalmology recommendations  - Continue Diamoxx  - Regular diet  - Up with assist  - SBP <140      -----------------------------------  Chad Dooley MD  Neurosurgery PGY-3    Interval History/Subjective: Admitted to 4A.  Floor status.    Objective:   Temp:  [97.9  F (36.6  C)-98.2  F (36.8  C)] 97.9  F (36.6  C)  Pulse:  [59-93] 64  Resp:  [14-38] 20  BP: (100-153)/(50-78) 118/70  SpO2:  [92 %-98 %] 97 %  I/O last 3 completed shifts:  In: -   Out: 20 [Drains:20]    Gen: Appears comfortable, NAD  Wound: Incision, clean, dry, intact without strikethrough  Neurologic:  - Alert & Oriented to person, place, time, and situation  - Follows commands briskly  - Speech fluent, spontaneous. No aphasia or dysarthria.  - No gaze preference. No apparent hemineglect.  Bilateral central scotoma.  -  PERRL, EOMI  - Strong brow raise, eye closure, and smile  - Face symmetric with sensation intact to light touch  - Trapezii and sternocleidomastoid muscles 5/5 bilaterally  - No pronator drift     Del Tr Bi WE WF Gr   R 5 5 5 5 5 5   L 5 5 5 5 5 5    HF KE KF DF PF EHL   R 5 5 5 5 5 5   L 5 5 5 5 5 5     Sensation intact and symmetric to light touch throughout    LABS  Recent Labs   Lab Test 05/26/22  0832 05/25/22  1408   WBC 5.6 9.6   HGB 12.5 14.8   MCV 88 90    410       Recent Labs   Lab Test 05/27/22 2032 05/26/22  0832 05/25/22  1408   NA  --  138 137   POTASSIUM 2.7* 3.7 3.5   CHLORIDE  --  106 99   CO2  --  24 27   BUN  --  11 12   CR  --  0.69 0.80   ANIONGAP  --  8 11   JOE  --  8.9 10.0   GLC  --  140* 104       IMAGING  No results found for this or any previous visit (from the past 24 hour(s)).

## 2022-05-28 NOTE — PROGRESS NOTES
Admitted/transferred from: [facility] via EMS. Arrive to  around 1915  Reason for admission/transfer: Ophthalmologist consult for optical nerve fenestration and neurosurg consult for possible  shunt  2 RN skin assessment: completed by Francoise GRAHAM and Bib CHATMAN.   Result of skin assessment and interventions/actions: No interventions needed. Up ad ivette.   Height, weight, drug calc weight: Unable to complete d/t pts bed screen not working  Patient belongings: cell phone, remains w/ patient  MDRO education added to care plan: NA  ?

## 2022-05-28 NOTE — PLAN OF CARE
Neuro: A&Ox4. 5/5 strength in all extremities. PERRLA. Pupils were initially 2-3 mm at admission. Per ophthalmology L pupil is 1 mm smaller than R eye. Following ophthalmology consult eyes were dilated (7 mm) until approx 0400 when they were at 5 mm.   Headache was a 2/10 upon arrival to , started draining 10 mL/hr via lumbar drain at 2100, by 0400 pt stated headache was completely gone. No PRN pain medications or scheduled tylenol given this shift.   Cardiac: Pulse ranging from mid 50's-70s. SBP ranging from low 100's-130's, SBP goal below 140 w/o intervention. Afebrile.  Respiratory: SpO2 above 92% on RA. LS clear and equal bilaterally  GI: Active bowel sounds. No BM since 5/25, pt states now that she can walk/move around and drink a cup of coffee she will be able to have BM and therefore declined scheduled senna.   : Voiding spontaneously on own.   LDAs: PIV x2  Integ: Lumbar drain incision site.  Labs: K+ 2.7 @ 2100, replaced w/ 60 mEq of PO tablets, recheck K+ at 3.3 around 0500, additional 40 mEq given per protocol, potassium level ordered for 1030 for recheck  Misc: Opthalmology consult completed     For vital signs and complete assessments, please see documentation flowsheets    Plan: continue q2h neuro checks, drain 10 mL/hr via lumbar drain, transfer to  when bed becomes available.    Goal Outcome Evaluation: Reviewed w/ pt    Problem: Pain Acute  Goal: Acceptable Pain Control and Functional Ability  Intervention: Prevent or Manage Pain  Flowsheets  Taken 5/28/2022 0532  Sensory Stimulation Regulation:    quiet environment promoted    lighting decreased  Sleep/Rest Enhancement:    awakenings minimized    consistent schedule promoted    natural light exposure provided    noise level reduced    room darkened  Bowel Elimination Promotion:    ambulation promoted    adequate fluid intake promoted  Medication Review/Management: medications reviewed  Taken 5/28/2022 0400  Sleep/Rest Enhancement:     awakenings minimized    consistent schedule promoted    noise level reduced    regular sleep/rest pattern promoted  Medication Review/Management: medications reviewed  Taken 5/28/2022 0000  Sleep/Rest Enhancement:    awakenings minimized    consistent schedule promoted    noise level reduced    regular sleep/rest pattern promoted  Medication Review/Management: medications reviewed  Taken 5/27/2022 2000  Sleep/Rest Enhancement:    awakenings minimized    consistent schedule promoted    noise level reduced    regular sleep/rest pattern promoted  Medication Review/Management: medications reviewed

## 2022-05-28 NOTE — CONSULTS
OPHTHALMOLOGY CONSULT NOTE  05/27/22    Patient: Parul Veliz  Consulted by: Neurosurgery  Reason for Consult: Evaluation for optic nerve sheath fenestration    HISTORY OF PRESENTING ILLNESS:     Parul Veliz is a 61 year old female who was transferred to the hospital for ophthalmology evaluation of decreased vision in both eyes. On Tuesday morning she had difficulty reading her computer screen and she thought she was just tired. Then on Wednesday she woke up with it and it was blurry everywhere in her central vision. She closed her right eye and noticed she has an oval shaped blind spot in her superior central vision. In her right eye she has a gray blind spot dead in the center. She is able to see everything perfectly surrounding these areas though. She says her peripheral vision is fine, its just the central area that is obscured. She says her vision is not distorted but just blurred, especially up close and says she has an easier time reading farther away. She denies history of diabetes. She denies floaters, pain, transient visual obscurations, changes in color vision, or whooshing noises in her ears. She says she has had headaches all her life that are normally a dull pain in the frontal lobes. The headaches she have been having lately are more constant pain in the back of her head that have gone away with tylenol. She has had visual auras with migraines before which usually look like half rainbows that only last for a short time. She says her current symptoms do not look anything like these. She has been seeing what look like red and gray webbed things that look like nerves that move around in her vision when she closes her eyes.     Regarding her eye history she wears glasses and has had a PVD in her left eye 1.5 years ago but did not have any complications requiring laser. She was born 8 weeks prematurely but never had any eye exam when she was a baby.    She also has a history of cervical cancer  s/p radical hysterectomy and breast cancer s/p double mastectomy in 2014. She says she is cancer free at this time and is done with her monitoring.     She initially presented to [place] and had an LP completed which showed and elevated opening pressure of 31 cm H2O along with starting diamox 500 mg PO BID and methylprednisolone 250 mg PO QID for a 5 day taper. She had a lumbar drain placed and has been having fluid taken off throughout the day with some improvement in her headache. She said when it was initially placed she had a significant benefit and now it has somewhat plateaued.     Review of systems were otherwise negative except for that which has been stated above.      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      Hemangioma of skin      HTN (hypertension)      Infiltrating ductal carcinoma of LEFT breast, BX 10/9/12 10/15/2012     Lumbar disc disease      Nephrolithiasis      Obesity      RACHID (obstructive sleep apnea)      PE (pulmonary embolism) 2008    during pelvic surgery     Pericardial cyst        Past Surgical History:   Procedure Laterality Date     CHOLECYSTECTOMY, LAPOROSCOPIC  6/2011    Cholecystectomy, Laparoscopic     COLONOSCOPY  2011     HYSTERECTOMY, SANDRA  2/08    radical hysterectomy, BSO, hx of cx ca     IR LUMBAR DRAIN PLACEMENT W FLUORO  5/26/2022       EXAMINATION:     Base Eye Exam     Visual Acuity (Snellen - Linear)       Right Left    Near cc 20/40-2 20/40+2    Correction: Glasses           "Tonometry (Tonopen, 9:05 PM)       Right Left    Pressure 12 15          Pupils       Pupils    Right PERRL    Left PERRL          Visual Fields       Left Right     Full Full          Extraocular Movement       Right Left     Full, Ortho Full, Ortho          Neuro/Psych     Oriented x3: Yes    Mood/Affect: Normal          Dilation     Both eyes: 1.0% Mydriacyl, 2.5% Tima Synephrine @ 9:05 PM            Slit Lamp and Fundus Exam     External Exam       Right Left    External Normal Normal          Slit Lamp Exam       Right Left    Lids/Lashes Normal Normal    Conjunctiva/Sclera White and quiet White and quiet    Cornea Clear Clear    Anterior Chamber Deep and quiet Deep and quiet    Iris Round and reactive Round and reactive    Lens Clear Clear    Vitreous Normal Normal          Fundus Exam       Right Left    Disc Normal Normal    C/D Ratio 0.2 0.2    Macula 3 discrete round flat yellow spots in the superior macula adjacent to and involving the fovea 5 discrete round flat yellow spots in the superior macula adjacent to and involving the fovea    Vessels Normal Normal    Periphery Normal Normal                Labs/Studies/Imaging Performed:  MRI head, MRA head at [facility]:  \"IMPRESSION:  HEAD MRI:   1.  No acute intracranial abnormality  2.  Nonspecific scattered small foci of T2 FLAIR hyperintensity in the cerebral white matter can be seen in setting of chronic small vessel ischemic changes, prior trauma or insult, or migraines. Demyelination is not favored.     HEAD MRA:   1.  Normal MRA [place].     NECK MRA:  1.  Normal neck MRA.     HEAD MRV:  1.  Severe stenosis of the distal transverse sinuses bilaterally, which is nonspecific but can be seen in setting of idiopathic intracranial hypertension.  2.  No additional cervical stenosis or occlusion. No thrombosis.\"    Since admission, normal labs: CBC, INR, Mag, TSH, BMP, AST, ALT, LA, blood culture, COVID, ACE, Lyme Abs, CSF studies (glucose, protein, " gram stain, aerobic culture, cell count, LA, anaerobic culture  Since admission abnormal labs: Alk phos elevated, albumin slightly low, ESR and CRP both very elevated (62 and 8.1)  In process: CSF lyme IgG/IgM, Lyme Ab     ASSESSMENT/PLAN:     Parul Veliz is a 61 year old female who presents with bilateral central scotomas and increased intracranial pressure.    # Central Scotomas, both eyes  - differential diagnosis most suspicious for acute multifocal placoid pigment epitheliopathy (AMPPE), however also includes vascular occlusion (though oddly symmetrical and simultaneous), vasculitis (infectious, inflammatory - sarcoid, lupus), herpetic retinitis (VZV, HSV), HIV retinopathy, cancer (although again, oddly symmetrical and simultaneous presentation)  - ESR and CRP are both significantly elevated  - symptoms started on 5/24/22 were mild and then progressed on 5/25/22  - she does not have any disc edema in either eye, so titration of CSF removal should be based on other symptoms like headache and not visual symptoms as they are unrelated    Plan:   - please test for syphilis, TB, and HIV  - she will need clinic follow up for additional testing (OCT, possible fluorescein angiogram) next week  - there is no treatment for AMPPE, however     It is our pleasure to participate in this patient's care and treatment. Please contact us with any further questions or concerns.    Discussed with senior resident Dr. Norma Dixon and retina fellow Dr. Immanuel Mullins. Briefly reviewed with the oculoplastics team Dr. Parikh and Dr. Murguia.      Brigida Wiggins MD  Ophthalmology Resident, PGY-2  Pager: [phone]

## 2022-05-28 NOTE — H&P
[facility]       NEUROSURGERY H&P NOTE      HPI: Parul Veliz is a 61 year old female with a PMH of hypertension, asthma, on aspirin, history of chronic headaches (bifrontal and occipital, resolve with Tylenol), who presented to OSH after 1 week of headaches that were the same characteristics as her typical headaches, which then progressed to blurry vision starting on Tuesday of this week. Given persistence of blurry vision on Wednesday, she presented to ED at OSH. She describes central scotoma in bilateral eyes. No changes in peripheral vision. Vision has not worsened or improved since then. She underwent an LP at OSH with an opening pressure of 36, and underwent a lumbar drain placement due to concern for IIH. An MRI demonstrated bilateral L>R transverse sinus stenosis. Given a one time fever on presentation at ED, she also underwent an infectious workup in CSF and was started on empiric antibiotics. Given the lack improvement in vision after CSF drainage (drained one during LP and once for 10cc with lumbar drain), transfer to [facility] for ophthalmology evaluation was requested. She currently endorses 2/10 bifrontal and occipital headache. She denies nausea, vomiting, numbness, weakness, changes in speech, neck stiffness. Denies fevers before admission.          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      Hemangioma of skin      HTN (hypertension)      Infiltrating ductal carcinoma of LEFT breast, BX 10/9/12 10/15/2012     Lumbar disc disease      Nephrolithiasis      Obesity      RACHID (obstructive sleep apnea)      PE (pulmonary embolism) 2008    during pelvic surgery     Pericardial cyst        PAST SURGICAL HISTORY:   Past Surgical History:   Procedure Laterality Date     CHOLECYSTECTOMY, LAPOROSCOPIC  6/2011     Cholecystectomy, Laparoscopic     COLONOSCOPY  2011     HYSTERECTOMY, SANDRA  2/08    radical hysterectomy, BSO, hx of cx ca     IR LUMBAR DRAIN PLACEMENT W FLUORO  5/26/2022       FAMILY HISTORY:   Family History   Problem Relation Age of Onset     Cerebrovascular Disease Mother      Breast Cancer Maternal Aunt      Breast Cancer Paternal Aunt        SOCIAL HISTORY:   Social History     Tobacco Use     Smoking status: Current Every Day Smoker     Packs/day: 1.00     Years: 20.00     Pack years: 20.00     Types: Cigarettes     Smokeless tobacco: Never Used     Tobacco comment: 1 pp week   Substance Use Topics     Alcohol use: Yes     Comment: 1-2/week       MEDICATIONS:  Medications Prior to Admission   Medication Sig Dispense Refill Last Dose     albuterol (PROAIR HFA/PROVENTIL HFA/VENTOLIN HFA) 108 (90 Base) MCG/ACT inhaler Inhale 2 puffs into the lungs every 6 hours as needed for shortness of breath / dyspnea or wheezing   Unknown at Unknown time     aspirin 81 MG EC tablet Take 81 mg by mouth daily   5/25/2022     cetirizine (ZYRTEC) 10 MG tablet Take 10 mg by mouth daily   5/25/2022     hydrochlorothiazide (HYDRODIURIL) 25 MG tablet Take 25 mg by mouth daily   5/25/2022     valsartan (DIOVAN) 80 MG tablet Take 1 tablet by mouth daily. 90 tablet 3 5/25/2022       Allergies:  Allergies   Allergen Reactions     Pcn [Penicillins] Unknown     childhood rxn  5/26/22 tolerating ampicillin and ceftriaxone while inpt at [facility]       ROS: 10 point ROS of systems including Constitutional, Eyes, Respiratory, Cardiovascular, Gastroenterology, Genitourinary, Integumentary, Muscularskeletal, Psychiatric were all negative except for pertinent positives noted in my HPI.    Physical exam:   Blood pressure 121/72, pulse 68, temperature 97.9  F (36.6  C), temperature source Oral, resp. rate 20, SpO2 98 %.  CV: BP and HR as noted above  PULM: breathing comfortably on room air  ABD: soft, non-distended  NEUROLOGIC:  -- Awake;  Alert; oriented x 3  -- Follows commands briskly  -- +repetition, calculation, and naming  -- Speech fluent, spontaneous. No aphasia or dysarthria.  -- no gaze preference. No apparent hemineglect.  Cranial Nerves:  -- visual fields full to confrontation, PERRL 3-2mm bilat and brisk, extraocular movements intact  -- Acuity 20/40 bilaterally when chart adjusted to avoid scotomas in each eye  -- face symmetrical, tongue midline  -- sensory V1-V3 intact bilaterally  -- palate elevates symmetrically, uvula midline  -- hearing grossly intact bilat  -- Trapezii 5/5 strength bilat symmetric  -- Cerebellar: Finger nose finger without dysmetria, intact rapid alternating motions bilaterally    Motor:  Normal bulk / tone; no tremor, rigidity, or bradykinesia.  No muscle wasting or fasciculations  No Pronator Drift     Delt Bi Tri Hand Flexion/  Extension Iliopsoas Quadriceps Hamstrings Tibialis Anterior Gastroc    C5 C6 C7 C8/T1 L2 L3 L4-S1 L4 S1   R 5 5 5 5 5 5 5 5 5   L 5 5 5 5 5 5 5 5 5   Sensory:  intact to LT x 4 extremities     Reflexes:     Bi Tri BR Jessica Pat Ach Bab    C5-6 C7-8 C6 UMN L2-4 S1 UMN   R 2+ 2+ 2+ Norm 2+ 2+ Norm   L 2+ 2+ 2+ Norm 2+ 2+ Norm     Gait: Deferred      LABS:  Last Comprehensive Metabolic Panel:  Sodium   Date Value Ref Range Status   05/26/2022 138 136 - 145 mmol/L Final   09/25/2012 139 136 - 145 mmol/L Final     Potassium   Date Value Ref Range Status   05/27/2022 2.7 (L) 3.4 - 5.3 mmol/L Final   09/25/2012 4.4 3.5 - 5.1 mmol/L Final     Chloride   Date Value Ref Range Status   05/26/2022 106 98 - 107 mmol/L Final   09/25/2012 106 98 - 107 mmol/L Final     Carbon Dioxide   Date Value Ref Range Status   09/25/2012 21 (L) 23 - 29 mmol/L Final     Carbon Dioxide (CO2)   Date Value Ref Range Status   05/26/2022 24 22 - 31 mmol/L Final     Anion Gap   Date Value Ref Range Status   05/26/2022 8 5 - 18 mmol/L Final   05/23/2011 10 6 - 17 mmol/L Final     Glucose   Date Value Ref Range Status    05/26/2022 140 (H) 70 - 125 mg/dL Final   09/25/2012 92 70 - 105 mg/dL Final     Urea Nitrogen   Date Value Ref Range Status   05/26/2022 11 8 - 22 mg/dL Final   09/25/2012 11 7 - 30 mg/dL Final     BUN/Creatinine Ratio   Date Value Ref Range Status   09/25/2012 13.1 CALC Final     Creatinine   Date Value Ref Range Status   05/26/2022 0.69 0.60 - 1.10 mg/dL Final   09/25/2012 0.8 0.7 - 1.3 mg/dL Final     GFR Estimate   Date Value Ref Range Status   05/26/2022 >90 >60 mL/min/1.73m2 Final     Comment:     Effective December 21, 2021 eGFRcr in adults is calculated using the 2021 CKD-EPI creatinine equation which includes age and gender (Tonya barahona al., [place], DOI: 10.1056/EAYKon0333497)   05/23/2011 86 >60 mL/min/1.7m2 Final     Calcium   Date Value Ref Range Status   05/26/2022 8.9 8.5 - 10.5 mg/dL Final   09/25/2012 9.2 8.5 - 10.5 mg/dL Final     Lab Results   Component Value Date    WBC 5.6 05/26/2022    WBC 6.1 05/23/2011     Lab Results   Component Value Date    RBC 4.15 05/26/2022    RBC 4.78 05/23/2011     Lab Results   Component Value Date    HGB 12.5 05/26/2022    HGB 15.2 05/23/2011     Lab Results   Component Value Date    HCT 36.3 05/26/2022    HCT 44.1 05/23/2011     Lab Results   Component Value Date    MCV 88 05/26/2022    MCV 92 05/23/2011     Lab Results   Component Value Date    MCH 30.1 05/26/2022    MCH 31.8 05/23/2011     Lab Results   Component Value Date    MCHC 34.4 05/26/2022    MCHC 34.5 05/23/2011     Lab Results   Component Value Date    RDW 11.8 05/26/2022    RDW 12.7 05/23/2011     Lab Results   Component Value Date     05/26/2022     05/23/2011         IMAGING:  IMPRESSION:  HEAD MRI:   1.  No acute intracranial abnormality  2.  Nonspecific scattered small foci of T2 FLAIR hyperintensity in the cerebral white matter can be seen in setting of chronic small vessel ischemic changes, prior trauma or insult, or migraines. Demyelination is not favored.     HEAD MRA:   1.  Normal  MRA [place].     NECK MRA:  1.  Normal neck MRA.     HEAD MRV:  1.  Severe stenosis of the distal transverse sinuses bilaterally, which is nonspecific but can be seen in setting of idiopathic intracranial hypertension.  2.  No additional cervical stenosis or occlusion. No thrombosis.    ASSESSMENT: 60 yo female with IIH with predominant visual symptoms, persistent after 1 day of CSF drainage.     Plan:  Lumbar drainage 10ml/hr  Ophthalmology consult to assess need for nerve sheath fenestration  Continue diamox  Regular diet  Up w/ assist  AM labs  SBP<140      The patient was discussed with Dr. Payne, neurosurgery chief resident, and Dr. Beckford, neurosurgery staff, and they agree with the above.    Abbey Carrillo MD  Neurosurgery Resident, PGY-2              Clinically Significant Risk Factors Present on Admission        # Hypokalemia: K = 2.7 mmol/L (Ref range: 3.4 - 5.3 mmol/L) on admission, will replace as needed        # Platelet Defect: home medication list includes an antiplatelet medication

## 2022-05-29 ENCOUNTER — APPOINTMENT (OUTPATIENT)
Dept: OCCUPATIONAL THERAPY | Facility: CLINIC | Age: 62
End: 2022-05-29
Attending: NEUROLOGICAL SURGERY
Payer: COMMERCIAL

## 2022-05-29 LAB
ANION GAP SERPL CALCULATED.3IONS-SCNC: 9 MMOL/L (ref 3–14)
ANNOTATION COMMENT IMP: ABNORMAL
B BURGDOR DNA SPEC QL NAA+PROBE: NOT DETECTED
BUN SERPL-MCNC: 23 MG/DL (ref 7–30)
CALCIUM SERPL-MCNC: 8.8 MG/DL (ref 8.5–10.1)
CHLORIDE BLD-SCNC: 112 MMOL/L (ref 94–109)
CO2 SERPL-SCNC: 20 MMOL/L (ref 20–32)
CREAT SERPL-MCNC: 0.87 MG/DL (ref 0.52–1.04)
ERYTHROCYTE [DISTWIDTH] IN BLOOD BY AUTOMATED COUNT: 12.3 % (ref 10–15)
GFR SERPL CREATININE-BSD FRML MDRD: 76 ML/MIN/1.73M2
GLUCOSE BLD-MCNC: 128 MG/DL (ref 70–99)
HCT VFR BLD AUTO: 40 % (ref 35–47)
HGB BLD-MCNC: 13.2 G/DL (ref 11.7–15.7)
MAGNESIUM SERPL-MCNC: 2.3 MG/DL (ref 1.6–2.3)
MCH RBC QN AUTO: 29.9 PG (ref 26.5–33)
MCHC RBC AUTO-ENTMCNC: 33 G/DL (ref 31.5–36.5)
MCV RBC AUTO: 91 FL (ref 78–100)
PHOSPHATE SERPL-MCNC: 2.5 MG/DL (ref 2.5–4.5)
PHOSPHATE SERPL-MCNC: 3.1 MG/DL (ref 2.5–4.5)
PLATELET # BLD AUTO: 424 10E3/UL (ref 150–450)
POTASSIUM BLD-SCNC: 3.2 MMOL/L (ref 3.4–5.3)
POTASSIUM BLD-SCNC: 3.8 MMOL/L (ref 3.4–5.3)
RBC # BLD AUTO: 4.41 10E6/UL (ref 3.8–5.2)
RETINYL PALMITATE SERPL-MCNC: <0.02 MG/L
SODIUM SERPL-SCNC: 141 MMOL/L (ref 133–144)
T PALLIDUM AB SER QL: NONREACTIVE
VIT A SERPL-MCNC: 0.25 MG/L
WBC # BLD AUTO: 9.1 10E3/UL (ref 4–11)

## 2022-05-29 PROCEDURE — 84100 ASSAY OF PHOSPHORUS: CPT | Performed by: STUDENT IN AN ORGANIZED HEALTH CARE EDUCATION/TRAINING PROGRAM

## 2022-05-29 PROCEDURE — 82947 ASSAY GLUCOSE BLOOD QUANT: CPT | Performed by: STUDENT IN AN ORGANIZED HEALTH CARE EDUCATION/TRAINING PROGRAM

## 2022-05-29 PROCEDURE — 36415 COLL VENOUS BLD VENIPUNCTURE: CPT | Performed by: STUDENT IN AN ORGANIZED HEALTH CARE EDUCATION/TRAINING PROGRAM

## 2022-05-29 PROCEDURE — 97165 OT EVAL LOW COMPLEX 30 MIN: CPT | Mod: GO

## 2022-05-29 PROCEDURE — 250N000013 HC RX MED GY IP 250 OP 250 PS 637: Performed by: STUDENT IN AN ORGANIZED HEALTH CARE EDUCATION/TRAINING PROGRAM

## 2022-05-29 PROCEDURE — 84132 ASSAY OF SERUM POTASSIUM: CPT | Performed by: NEUROLOGICAL SURGERY

## 2022-05-29 PROCEDURE — 83735 ASSAY OF MAGNESIUM: CPT | Performed by: STUDENT IN AN ORGANIZED HEALTH CARE EDUCATION/TRAINING PROGRAM

## 2022-05-29 PROCEDURE — 250N000013 HC RX MED GY IP 250 OP 250 PS 637: Performed by: NEUROLOGICAL SURGERY

## 2022-05-29 PROCEDURE — 250N000013 HC RX MED GY IP 250 OP 250 PS 637

## 2022-05-29 PROCEDURE — 85027 COMPLETE CBC AUTOMATED: CPT | Performed by: STUDENT IN AN ORGANIZED HEALTH CARE EDUCATION/TRAINING PROGRAM

## 2022-05-29 PROCEDURE — 36415 COLL VENOUS BLD VENIPUNCTURE: CPT | Performed by: NEUROLOGICAL SURGERY

## 2022-05-29 PROCEDURE — 120N000002 HC R&B MED SURG/OB UMMC

## 2022-05-29 RX ORDER — POTASSIUM CHLORIDE 20MEQ/15ML
20 LIQUID (ML) ORAL ONCE
Status: DISCONTINUED | OUTPATIENT
Start: 2022-05-29 | End: 2022-05-29 | Stop reason: CLARIF

## 2022-05-29 RX ORDER — ACETAZOLAMIDE 500 MG/1
500 CAPSULE, EXTENDED RELEASE ORAL EVERY 12 HOURS
Qty: 60 CAPSULE | Refills: 1 | Status: SHIPPED | OUTPATIENT
Start: 2022-05-29 | End: 2022-05-30

## 2022-05-29 RX ORDER — ACETAMINOPHEN 325 MG/1
650 TABLET ORAL EVERY 4 HOURS PRN
Qty: 60 TABLET | Refills: 0 | Status: SHIPPED | OUTPATIENT
Start: 2022-05-30 | End: 2022-10-19

## 2022-05-29 RX ORDER — POTASSIUM CHLORIDE 750 MG/1
40 TABLET, EXTENDED RELEASE ORAL ONCE
Status: COMPLETED | OUTPATIENT
Start: 2022-05-29 | End: 2022-05-29

## 2022-05-29 RX ORDER — POTASSIUM CHLORIDE 750 MG/1
20 TABLET, EXTENDED RELEASE ORAL ONCE
Status: COMPLETED | OUTPATIENT
Start: 2022-05-29 | End: 2022-05-29

## 2022-05-29 RX ORDER — CETIRIZINE HYDROCHLORIDE 10 MG/1
10 TABLET ORAL DAILY
Status: DISCONTINUED | OUTPATIENT
Start: 2022-05-29 | End: 2022-05-30 | Stop reason: HOSPADM

## 2022-05-29 RX ORDER — ASPIRIN 81 MG/1
81 TABLET ORAL DAILY
Qty: 30 TABLET | Refills: 0 | Status: SHIPPED | OUTPATIENT
Start: 2022-06-02 | End: 2022-07-02

## 2022-05-29 RX ADMIN — ACETAZOLAMIDE 500 MG: 500 CAPSULE, EXTENDED RELEASE ORAL at 10:42

## 2022-05-29 RX ADMIN — VALSARTAN 80 MG: 80 TABLET, FILM COATED ORAL at 08:39

## 2022-05-29 RX ADMIN — POTASSIUM CHLORIDE 40 MEQ: 750 TABLET, EXTENDED RELEASE ORAL at 10:42

## 2022-05-29 RX ADMIN — CETIRIZINE HYDROCHLORIDE 10 MG: 10 TABLET, FILM COATED ORAL at 10:41

## 2022-05-29 RX ADMIN — SENNOSIDES AND DOCUSATE SODIUM 1 TABLET: 8.6; 5 TABLET ORAL at 20:26

## 2022-05-29 RX ADMIN — POTASSIUM CHLORIDE 20 MEQ: 750 TABLET, EXTENDED RELEASE ORAL at 17:19

## 2022-05-29 RX ADMIN — HYDROCHLOROTHIAZIDE 25 MG: 25 TABLET ORAL at 08:38

## 2022-05-29 RX ADMIN — ACETAZOLAMIDE 500 MG: 500 CAPSULE, EXTENDED RELEASE ORAL at 22:12

## 2022-05-29 ASSESSMENT — ACTIVITIES OF DAILY LIVING (ADL)
ADLS_ACUITY_SCORE: 20
PREVIOUS_RESPONSIBILITIES: MEAL PREP;HOUSEKEEPING;LAUNDRY;SHOPPING;YARDWORK;FINANCES;MEDICATION MANAGEMENT;DRIVING;WORK
ADLS_ACUITY_SCORE: 20
ADLS_ACUITY_SCORE: 21
ADLS_ACUITY_SCORE: 21
ADLS_ACUITY_SCORE: 20
ADLS_ACUITY_SCORE: 21
ADLS_ACUITY_SCORE: 20
ADLS_ACUITY_SCORE: 21

## 2022-05-29 ASSESSMENT — VISUAL ACUITY
OS: OTHER (SEE COMMENTS)
OD: OTHER (SEE COMMENTS)
OD: OTHER (SEE COMMENTS)
OS: OTHER (SEE COMMENTS)

## 2022-05-29 NOTE — PROGRESS NOTES
CLINICAL NUTRITION SERVICES - ASSESSMENT NOTE     Nutrition Prescription    RECOMMENDATIONS FOR MDs/PROVIDERS TO ORDER:  Pt would benefit from and would like outpatient weight management follow up with RD    Malnutrition Status:    Patient does not meet two of the established criteria necessary for diagnosing malnutrition    Recommendations already ordered by Registered Dietitian (RD):  Nutrition education - weight loss strategies    Future/Additional Recommendations:  Monitor labs, intakes, and weight trends.     REASON FOR ASSESSMENT  Parul Veliz is a/an 61 year old female assessed by the dietitian for Provider Order - Idiopathic intracranial hypertension- need for weight loss    NUTRITION/MEDICAL HISTORY  History of HTN, asthma, and chronic headache.   presented to OSH after 1 week of headaches that progressed to blurry vision starting on 5/24.  Given the persistence of blurry vision, presented to OSH on 5/25. Underwent LP at OSH with opening pressure 36 and then lumbar drain placement for IIH.  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  Also started on antibiotics given one-time fever, and CSF was obtained, transferred to [facility] for Ophthalmology evaluation on 5/27, after blurry vision did not improve.  Since arriving, lumbar drain drainage has continued at 10 mL/hr.  She has remained neurologically intact aside from her central scotoma.  Per Ophthalmology evaluation, does not demonstrate papilledema that would explain blurry vision.  Tentatively plan for Neuro IR involvement for stenting of the transverse sinuses.    FINDINGS  Patient believed she was at a good weight given that she had been more or less weight stable for many years. Now is aware that she should lose 20-30 lb per MD. Pt agreeable to losing weight and would like to know how to best do so. Discussed multiple strategies for weight loss regarding intake such as portion control, mindful eating, calorie goals/meal plan  "ideas etc. Patient interested in weight management follow up.     CURRENT NUTRITION ORDERS  Diet: Regular  Intake/Tolerance: 100% of documented meals    LABS  Labs reviewed: Na 141 (WNL), K 3.2 (L), BUN 23 (WNL), Creatinine 0.87 (WNL), Mag 2.3 (WNL), Phos 3.1 (WNL, updtrending), Glucose 128     MEDICATIONS  Medications reviewed:  hydrochlorothiazide, potassium chloride,     ANTHROPOMETRICS  Height:  170.2 cm (5' 7\")   Most Recent Weight: 125.5 kg (276 lb 10.8 oz)    IBW: 61.4 kg  BMI: Obesity Grade III BMI >40  Weight History: Pt with limited weight history. Assume scale differences between 5/27-5/28. Pt states she has been within the same 10 lb range for many years with some fluctuation.  Wt Readings from Last Encounters:   05/28/22 125.5 kg (276 lb 10.8 oz)   05/27/22 127.9 kg (281 lb 14.4 oz)   08/25/21 119.1 kg (262 lb 9.6 oz)   09/05/19 121.8 kg (268 lb 8 oz)     Dosing Weight: 77 kg - ABW using current weight and IBW of 61.4 kg    ASSESSED NUTRITION NEEDS  Estimated Energy Needs: 2043-4194 kcals/day (20 - 25 kcals/kg)  Justification: Obese, weight loss wanted  Estimated Protein Needs:  grams protein/day (1.2 - 1.5 grams of pro/kg)  Justification: Obesity guidelines  Estimated Fluid Needs: 1 mL/kcal or per provider pending fluid status    PHYSICAL FINDINGS  See malnutrition section below.  Lumbar drain    MALNUTRITION  % Intake: No decreased intake noted  % Weight Loss: None noted  Subcutaneous Fat Loss: None observed  Muscle Loss: None observed  Fluid Accumulation/Edema: None noted  Malnutrition Diagnosis: Patient does not meet two of the established criteria necessary for diagnosing malnutrition    NUTRITION DIAGNOSIS  Food- and nutrition- related knowledge deficit related to therapeutic diet recommendations as evidenced by pt report of no/limited formal nutrition education.    INTERVENTIONS  Implementation  Nutrition education for nutrition relationship to health/disease   Nutrition education for " recommended modifications     Goals  Patient to consume % of nutritionally adequate meal trays TID, or the equivalent with supplements/snacks.     Monitoring/Evaluation  Progress toward goals will be monitored and evaluated per protocol.    Chelsi Kang MS, RDN, LDN  Weekend RD pager [phone]

## 2022-05-29 NOTE — PLAN OF CARE
Neuro: A&Ox4. 5/5 strength in all extremities. PERRLA. Pupils 2-3 mm with L pupil still being 1 mm smaller than R pupil. Central blurred vision spots remain unchanged.   Cardiac: SBP between 100's-120's, SBP goal below 140 maintained w/o intervention. Afebrile. Pulse between 50s-70's.   Respiratory: SpO2 maintained above 92% on RA. LS clear and equal bilaterally  GI: Normoactive bowel sounds.   : Voiding spontaneously on own  LDAs: PIV x2, lumbar drain  Integ: Lumbar drain incision site  Pain: pt reports no pain since draining 10 mL/hr from lumbar drain. No scheduled tylenol or PRNs needed.       For vital signs and complete assessments, please see documentation flowsheets    Plan: Continue to drain 10 mL/hr from lumbar drain, tx to 6A when bed becomes available.    Problem: Pain Acute  Goal: Acceptable Pain Control and Functional Ability  Outcome: Ongoing, Progressing  Intervention: Develop Pain Management Plan  Flowsheets (Taken 5/29/2022 0043)  Pain Management Interventions:    declines    quiet environment facilitated    emotional support    care clustered  Intervention: Prevent or Manage Pain  Recent Flowsheet Documentation  Taken 5/29/2022 0000 by Francoise Knapp, RN  Sensory Stimulation Regulation:    care clustered    lighting decreased    quiet environment promoted  Sleep/Rest Enhancement:    awakenings minimized    noise level reduced    room darkened    regular sleep/rest pattern promoted  Bowel Elimination Promotion:    adequate fluid intake promoted    ambulation promoted    privacy promoted  Medication Review/Management: medications reviewed  Taken 5/28/2022 2000 by Francoise Knapp, RN  Sensory Stimulation Regulation:    care clustered    lighting decreased    quiet environment promoted  Sleep/Rest Enhancement:    awakenings minimized    noise level reduced    room darkened    regular sleep/rest pattern promoted  Bowel Elimination Promotion:    adequate fluid intake promoted    ambulation  promoted    privacy promoted  Medication Review/Management: medications reviewed  Intervention: Optimize Psychosocial Wellbeing  Recent Flowsheet Documentation  Taken 5/29/2022 0000 by Francoise Knapp, RN  Supportive Measures:    active listening utilized    self-care encouraged    verbalization of feelings encouraged  Taken 5/28/2022 2000 by Francoise Knapp, RN  Supportive Measures:    active listening utilized    self-care encouraged    verbalization of feelings encouraged   Goal Outcome Evaluation: Reviewed with patient

## 2022-05-29 NOTE — PLAN OF CARE
Arrived from:  (at 0200)  Belongings/meds: belongings in room   2 RN Skin Assessment Completed by: Murray JOHNSTON RN and Patt JOHNSTON RN     Non-intact findings documented (yes/no/NA): N/A - skin intact     Status: Transfer from [facility] to  on 5/27 for blurry vision and Lumbar drain management. Ophthalmology consulted. History of breast CA, bilateral mastectomy. Working up for idiopathic intracranial HTN or need for stenting  Vitals: VSS  Neuros: A/Ox4, neuro intact except blurry vision and R pupil (3) mildly greater than L pupil (2). Previous headaches resolved with opening of LD  IV: PIV SL  Resp/trach: RA  Diet: Regular  Bowel status: No BM after transfer   : Voiding adequately   Skin: no deficits, LD site CDI and dressing was reinforced   Pain: Denies  Activity: Up independently or SBA, calls appropriately   Plan: Continue to monitor, draining LD q1hr for 10 mL   Updates this shift:   -Potentially clamping LD today

## 2022-05-29 NOTE — PROGRESS NOTES
Transferred to: 6A (room 13) from 4A at 0153  Belongings: cell phone, , clothes, shoes  Rodriguez removed? Not applicable  Central line removed? Not applicable  Chart and medications sent with patient: Yes  Family notified: No, patient stated she will inform her mom and dad in the morning when they are awake

## 2022-05-29 NOTE — PROGRESS NOTES
05/29/22 1100   Quick Adds   Type of Visit Initial Occupational Therapy Evaluation   Living Environment   People in Home parent(s)   Current Living Arrangements house   Home Accessibility stairs within home   Number of Stairs, Within Home, Primary ten   Stair Railings, Within Home, Primary railings safe and in good condition   Transportation Anticipated car, drives self;family or friend will provide   Living Environment Comments Pt resides in home with her father, reports that most of her living is main level, laundry in basement   Self-Care   Usual Activity Tolerance excellent   Current Activity Tolerance excellent   Regular Exercise No   Equipment Currently Used at Home none   Fall history within last six months no   Activity/Exercise/Self-Care Comment IND ADLS and IADLS, working full time at computer   Instrumental Activities of Daily Living (IADL)   Previous Responsibilities meal prep;housekeeping;laundry;shopping;yardwork;finances;medication management;driving;work   General Information   Onset of Illness/Injury or Date of Surgery 05/27/22   Referring Physician Chad Dooley MD   Patient/Family Therapy Goal Statement (OT) pt hopeful for vision return   Additional Occupational Profile Info/Pertinent History of Current Problem Idiopathic intracranial hypertension with vision impairment   Performance Patterns (Routines, Roles, Habits) typically IND with all activities   Existing Precautions/Restrictions   (lumbar drain)   Cognitive Status Examination   Orientation Status orientation to person, place and time   Visual Perception   Visual Impairment/Limitations blurry vision;near/reading vision impaired   Visual Field Deficit central vision impaired   Impact of Vision Impairment on Function (Vision) pt has hard time reading up close, and reports blurry vision for most items in room and at distance, but functionally is able to navigate room, read article on her phone, identify items when asked   Sensory    Sensory Quick Adds No deficits were identified   Pain Assessment   Patient Currently in Pain No   Integumentary/Edema   Integumentary/Edema no deficits were identifed   Range of Motion Comprehensive   General Range of Motion no range of motion deficits identified   Coordination   Upper Extremity Coordination No deficits were identified   Transfers   Transfers No deficits identified   Balance   Balance Assessment no deficits were identified   Activities of Daily Living   BADL Assessment/Intervention no deficits identified   Clinical Impression   Criteria for Skilled Therapeutic Interventions Met (OT) Evaluation only;Other (see comments)  (recommend OUTPATIENT OT FOR VISION)   OT Discharge Planning   OT Discharge Recommendation (DC Rec) home with outpatient occupational therapy  (OUTPATIENT VISION OT)   OT Rationale for DC Rec patient demonstrates safety in room and hallway ambulation, IND with ADLS, mobility and has family support and assist at home for IADLS as needed   OT Brief overview of current status IND ADLS and mobility   Total Evaluation Time (Minutes)   Total Evaluation Time (Minutes) 25

## 2022-05-29 NOTE — PLAN OF CARE
Status: Transfer from OSH to  on 5/27 for blurry vision and Lumbar drain management. Ophthalmology consulted. History of breast CA, bilateral mastectomy. From 4A to 6A overnight.  Vitals: VSS  Neuros: A/Ox4, neuro intact except blurry vision and R pupil (3) mildly greater than L pupil (2). No salty or metallic taste or drainage from nose or ears. Pt has had LD clamped since 0800 this am per orders and has tolerated well.  IV: PIV SL  Labs: K+ replaced x2 today, electrolytes ordered for am.  Resp/trach: RA  Diet: Regular  Bowel status: No BM   : Voiding   Skin: no deficits, LD site CDI and dressing was reinforced overnight  Pain: Slight headache resolved with Zyrtec.  Activity: Up independently or SBA, calls appropriately. Walked forbes and sat in chair today.  Plan: Continue to monitor, per team LD will likely be removed in am.  Per OT, pt will need OT Vision Therapy upon discharge.

## 2022-05-30 VITALS
BODY MASS INDEX: 43.33 KG/M2 | WEIGHT: 276.68 LBS | OXYGEN SATURATION: 98 % | HEART RATE: 74 BPM | RESPIRATION RATE: 16 BRPM | DIASTOLIC BLOOD PRESSURE: 82 MMHG | TEMPERATURE: 97.7 F | SYSTOLIC BLOOD PRESSURE: 130 MMHG

## 2022-05-30 LAB
A PHAGOCYTOPH DNA BLD QL NAA+PROBE: NOT DETECTED
ANION GAP SERPL CALCULATED.3IONS-SCNC: 8 MMOL/L (ref 3–14)
BUN SERPL-MCNC: 26 MG/DL (ref 7–30)
CALCIUM SERPL-MCNC: 8.9 MG/DL (ref 8.5–10.1)
CHLORIDE BLD-SCNC: 113 MMOL/L (ref 94–109)
CO2 SERPL-SCNC: 20 MMOL/L (ref 20–32)
CREAT SERPL-MCNC: 0.87 MG/DL (ref 0.52–1.04)
E CHAFFEENSIS DNA BLD QL NAA+PROBE: NOT DETECTED
E EWINGII DNA SPEC QL NAA+PROBE: NOT DETECTED
EHRLICHIA DNA SPEC QL NAA+PROBE: NOT DETECTED
ERYTHROCYTE [DISTWIDTH] IN BLOOD BY AUTOMATED COUNT: 12.4 % (ref 10–15)
GFR SERPL CREATININE-BSD FRML MDRD: 76 ML/MIN/1.73M2
GLUCOSE BLD-MCNC: 126 MG/DL (ref 70–99)
HCT VFR BLD AUTO: 42.1 % (ref 35–47)
HGB BLD-MCNC: 13.8 G/DL (ref 11.7–15.7)
MAGNESIUM SERPL-MCNC: 2.6 MG/DL (ref 1.6–2.3)
MCH RBC QN AUTO: 29.8 PG (ref 26.5–33)
MCHC RBC AUTO-ENTMCNC: 32.8 G/DL (ref 31.5–36.5)
MCV RBC AUTO: 91 FL (ref 78–100)
PHOSPHATE SERPL-MCNC: 3.6 MG/DL (ref 2.5–4.5)
PLATELET # BLD AUTO: 456 10E3/UL (ref 150–450)
POTASSIUM BLD-SCNC: 3.5 MMOL/L (ref 3.4–5.3)
RBC # BLD AUTO: 4.63 10E6/UL (ref 3.8–5.2)
SODIUM SERPL-SCNC: 141 MMOL/L (ref 133–144)
WBC # BLD AUTO: 9.5 10E3/UL (ref 4–11)

## 2022-05-30 PROCEDURE — 84100 ASSAY OF PHOSPHORUS: CPT | Performed by: STUDENT IN AN ORGANIZED HEALTH CARE EDUCATION/TRAINING PROGRAM

## 2022-05-30 PROCEDURE — 83735 ASSAY OF MAGNESIUM: CPT | Performed by: STUDENT IN AN ORGANIZED HEALTH CARE EDUCATION/TRAINING PROGRAM

## 2022-05-30 PROCEDURE — 250N000013 HC RX MED GY IP 250 OP 250 PS 637

## 2022-05-30 PROCEDURE — 250N000013 HC RX MED GY IP 250 OP 250 PS 637: Performed by: STUDENT IN AN ORGANIZED HEALTH CARE EDUCATION/TRAINING PROGRAM

## 2022-05-30 PROCEDURE — 85027 COMPLETE CBC AUTOMATED: CPT | Performed by: STUDENT IN AN ORGANIZED HEALTH CARE EDUCATION/TRAINING PROGRAM

## 2022-05-30 PROCEDURE — 80048 BASIC METABOLIC PNL TOTAL CA: CPT | Performed by: STUDENT IN AN ORGANIZED HEALTH CARE EDUCATION/TRAINING PROGRAM

## 2022-05-30 PROCEDURE — 250N000009 HC RX 250

## 2022-05-30 PROCEDURE — 36415 COLL VENOUS BLD VENIPUNCTURE: CPT | Performed by: STUDENT IN AN ORGANIZED HEALTH CARE EDUCATION/TRAINING PROGRAM

## 2022-05-30 RX ORDER — LIDOCAINE HYDROCHLORIDE 10 MG/ML
5 INJECTION, SOLUTION EPIDURAL; INFILTRATION; INTRACAUDAL; PERINEURAL ONCE
Status: COMPLETED | OUTPATIENT
Start: 2022-05-30 | End: 2022-05-30

## 2022-05-30 RX ORDER — ACETAZOLAMIDE 500 MG/1
500 CAPSULE, EXTENDED RELEASE ORAL EVERY 12 HOURS
Qty: 60 CAPSULE | Refills: 2 | Status: SHIPPED | OUTPATIENT
Start: 2022-05-30 | End: 2022-07-27

## 2022-05-30 RX ADMIN — LIDOCAINE HYDROCHLORIDE 5 ML: 10 INJECTION, SOLUTION EPIDURAL; INFILTRATION; INTRACAUDAL; PERINEURAL at 11:00

## 2022-05-30 RX ADMIN — VALSARTAN 80 MG: 80 TABLET, FILM COATED ORAL at 08:18

## 2022-05-30 RX ADMIN — SENNOSIDES AND DOCUSATE SODIUM 1 TABLET: 8.6; 5 TABLET ORAL at 08:18

## 2022-05-30 RX ADMIN — HYDROCHLOROTHIAZIDE 25 MG: 25 TABLET ORAL at 08:18

## 2022-05-30 RX ADMIN — CETIRIZINE HYDROCHLORIDE 10 MG: 10 TABLET, FILM COATED ORAL at 08:18

## 2022-05-30 ASSESSMENT — ACTIVITIES OF DAILY LIVING (ADL)
ADLS_ACUITY_SCORE: 20

## 2022-05-30 NOTE — PROGRESS NOTES
Discharge time/date: 1300 5/30/2022  Walked or Wheelchair: Walked  PIV removed: PIV removed   Reviewed AVS with patient: yes  Medication due times added to AVS in EPIC: yes  Verbalized understanding of discharge with teachback: yes  Medications retrieved from pharmacy: yes  Supplies sent home: none  Belongings from security with patient: none

## 2022-05-30 NOTE — PROGRESS NOTES
[facility], [place]   Neurosurgery Progress Note:    Assessment: Parul Veliz is a 61 year old female on PTA ASA 81 with PMH HTN, asthma, and chronic headache who presented to OSH after 1 week of headaches that progressed to blurry vision starting on 5/24.  Given the persistence of blurry vision, she presented to OSH on 5/25.  She underwent LP at OSH with opening pressure 36 and then lumbar drain placement for IIH.  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  She was also started on antibiotics given one-time fever, and CSF was obtained.  She was transferred to [facility] for Ophthalmology evaluation on 5/27, after her blurry vision did not improve.  Since arriving, lumbar drain drainage has continued at 10 mL/hr.  She has remained neurologically intact aside from her central scotoma.  Per Ophthalmology evaluation, she does not demonstrate papilledema that would explain her blurry vision and vision changes are likely unrelated to intracranial hypertension.  Lumbar drain was clamped on 5/29 with no increase in headache or blurry vision.    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  Neurosurgery Resident, PGY-3    Please contact neurosurgery resident on call with questions.    Dial * * [phone], enter [phone] when prompted.    -----------------------------------    Interval History/Subjective: Lumbar drain clamped yesterday.  Denies headache.  Ongoing blurry vision.    Objective:   Temp:  [96.5  F (35.8  C)-99.8  F (37.7  C)] 97.4  F (36.3  C)  Pulse:  [59-95] 95  Resp:  [16-20] 16  BP:  (105-149)/(60-87) 127/79  SpO2:  [94 %-99 %] 94 %  I/O last 3 completed shifts:  In: -   Out: 90 [Drains:90]    Gen: Appears comfortable, NAD  Wound: Lumbar drain dressing intact  Neurologic:  - Alert & Oriented to person, place, time, and situation  - Follows commands briskly  - Speech fluent, spontaneous. No aphasia or dysarthria.  - No gaze preference. No apparent hemineglect.  Bilateral central scotoma.  - PERRL, EOMI  - Strong brow raise, eye closure, and smile  - Face symmetric with sensation intact to light touch  - Trapezii and sternocleidomastoid muscles 5/5 bilaterally  - No pronator drift     Del Tr Bi WE WF Gr   R 5 5 5 5 5 5   L 5 5 5 5 5 5    HF KE KF DF PF EHL   R 5 5 5 5 5 5   L 5 5 5 5 5 5     Sensation intact and symmetric to light touch throughout    LABS  Recent Labs   Lab Test 05/29/22  0759 05/28/22  0458 05/26/22  0832   WBC 9.1 17.8* 5.6   HGB 13.2 12.4 12.5   MCV 91 90 88    462* 397       Recent Labs   Lab Test 05/29/22  1452 05/29/22  0759 05/28/22  1009 05/28/22  0458 05/27/22 2032 05/26/22  0832   NA  --  141  --  141  --  138   POTASSIUM 3.8 3.2* 3.5 3.3*   < > 3.7   CHLORIDE  --  112*  --  115*  --  106   CO2  --  20  --  18*  --  24   BUN  --  23  --  19  --  11   CR  --  0.87  --  0.67  --  0.69   ANIONGAP  --  9  --  8  --  8   JOE  --  8.8  --  9.1  --  8.9   GLC  --  128*  --  123*  --  140*    < > = values in this interval not displayed.       IMAGING  No results found for this or any previous visit (from the past 24 hour(s)).

## 2022-05-30 NOTE — PROGRESS NOTES
Care Management Discharge Note    Discharge Date: 05/30/2022       Discharge Disposition:  Home    Discharge Services:  Home care - skilled nurse (ordered by MD without RN CC consultation)    Discharge DME:  Unknown    Discharge Transportation: Father provided    Private pay costs discussed: No    PAS Confirmation Code:  Not applicable   Patient/family educated on Medicare website which has current facility and service quality ratings:  No - home care ordered without RNCC consultation.    Education Provided on the Discharge Plan:  No  Persons Notified of Discharge Plans: NA  Patient/Family in Agreement with the Plan: NA      Handoff Referral Completed: Yes    Additional Information:  Received message from 6A RN that patient was accepted by [facility] for home care. I was unaware of home care need or orders prior to discharge. Reviewed chart and noted home care RN order in Epic, no reason noted. OT recommended outpatient rehab on discharge.     No further intervention required at this time.     Shannon Saravia, RN, PHN  RN Care Coordinator   [address] [address]   [email or web address]   Casual Employee - Weekend Coverage only  To contact weekend RNCC, dial * * [phone] and enter pager number [phone] at prompt OR use [employer] to text page.  This pager can not be contacted by outside line.          Shannon Saravia, KEVIN

## 2022-05-30 NOTE — DISCHARGE SUMMARY
[facility] Discharge Summary and Instructions    Parul Veliz MRN# [id number]   Age: 61 year old YOB: 1960     Date of Admission:  5/27/2022  Date of Discharge::  5/30/2022  Admitting Physician:  Pancho Beckford MD  Discharge Physician:  Pancho Beckford MD          Admission Diagnoses:   Idiopathic intracranial hypertension [G93.2]          Discharge Diagnosis:     Idiopathic intracranial hypertension [G93.2]            Procedures:   1. Monitoring lumbar drain           Brief History of Illness:   Parul Veliz is a 61 year old female on PTA ASA 81 with PMH HTN, asthma, and chronic headache who presented to OSH after 1 week of headaches that progressed to blurry vision starting on 5/24.  Given the persistence of blurry vision, she presented to OSH on 5/25.  She underwent LP at OSH with opening pressure 36 and then lumbar drain placement for IIH.  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  She was also started on antibiotics given one-time fever, and CSF was obtained.  She was transferred to [facility] for Ophthalmology evaluation on 5/27, after her blurry vision did not improve.              Hospital Course:   Patient underwent above-mentioned procedure at OSH prior to transfer.   Since arriving 5/27, lumbar drain drainage continued at 10 mL/hr.  She remained neurologically intact aside from her central scotoma. She was restarted on Diamox 500mg BID.    Per Ophthalmology evaluation, she does not demonstrate papilledema that would explain her blurry vision and vision changes are likely unrelated to intracranial hypertension. Lumbar drain was clamped on 5/29 with no increase in headache or blurry vision.    On 5/30 lumbar drain was removed with no complications. Patient was flat for 2 hours. She was ambulating, voiding without a smith, eating a regular diet, pain was well controlled and therefore she was discharged home.     Exam on day of discharge:  Gen:  Appears comfortable, NAD  Wound: Lumbar drain site dry and clean  Neurologic:  - Alert & Oriented to person, place, time, and situation  - Follows commands briskly  - Speech fluent, spontaneous. No aphasia or dysarthria.  - No gaze preference. No apparent hemineglect.  Bilateral central scotoma.  - PERRL, EOMI  - Strong brow raise, eye closure, and smile  - Face symmetric with sensation intact to light touch  - Trapezii and sternocleidomastoid muscles 5/5 bilaterally  - No pronator drift       Del Tr Bi WE WF Gr   R 5 5 5 5 5 5   L 5 5 5 5 5 5     HF KE KF DF PF EHL   R 5 5 5 5 5 5   L 5 5 5 5 5 5      Sensation intact and symmetric to light touch throughout            Discharge Medications:     Current Discharge Medication List      START taking these medications    Details   acetaminophen (TYLENOL) 325 MG tablet Take 2 tablets (650 mg) by mouth every 4 hours as needed for pain (For optimal non-opioid multimodal pain management to improve pain control.)  Qty: 60 tablet, Refills: 0    Associated Diagnoses: Idiopathic intracranial hypertension         CONTINUE these medications which have CHANGED    Details   acetaZOLAMIDE (DIAMOX SEQUEL) 500 MG 12 hr capsule Take 1 capsule (500 mg) by mouth every 12 hours  Qty: 60 capsule, Refills: 2    Associated Diagnoses: Benign intracranial hypertension      aspirin 81 MG EC tablet Take 1 tablet (81 mg) by mouth daily  Qty: 30 tablet, Refills: 0    Associated Diagnoses: Idiopathic intracranial hypertension         CONTINUE these medications which have NOT CHANGED    Details   albuterol (PROAIR HFA/PROVENTIL HFA/VENTOLIN HFA) 108 (90 Base) MCG/ACT inhaler Inhale 2 puffs into the lungs every 6 hours as needed for shortness of breath / dyspnea or wheezing      cetirizine (ZYRTEC) 10 MG tablet Take 10 mg by mouth daily      hydrochlorothiazide (HYDRODIURIL) 25 MG tablet Take 25 mg by mouth daily      valsartan (DIOVAN) 80 MG tablet Take 1 tablet by mouth daily.  Qty: 90 tablet,  Refills: 3    Associated Diagnoses: HTN (hypertension)                     Discharge Instructions and Follow-Up:     Discharge diet: Regular   Discharge activity: You may advance activity as tolerated.   Discharge follow-up: Follow-up with Neurosurgery clinic as needed.    Follow-up with primary care within 1-2 weeks.   Wound care: Ok to shower, however no scrubbing of the wound and no soaking of the wound, meaning no bathtubs or swimming pools. Pat dry only. Leave wound open to air.  Patient to have wound checked 2 weeks following surgery.    Wound location: lower back  Closure technique: absorbable sutures  Dressing needs: none  Post-op care at follow-up: Keep dry and clean     Please call if you have:  1. increased pain, redness, drainage, swelling at your incision  2. fevers > 101.5 F degrees  3. with any questions or concerns.  You may reach the Neurosurgery clinic at [phone] during regular work hours. ER at [phone].    and ask for the Neurosurgery Resident on call at [phone], during off hours or weekends.         Discharge Disposition:     Discharged to home        Arthur Yuen MD, PhD  Department of Neurosurgery

## 2022-05-30 NOTE — PLAN OF CARE
Status: Transfer from OSH to  on 5/27 for blurry vision and Lumbar drain management. Ophthalmology consulted. History of breast CA, bilateral mastectomy.   Vitals: VSS on RA. BPs on L forearm.   Neuros: A/Ox4, blurry vision-unchanged and R pupil mildly greater than L pupil. No salty or metallic taste or drainage from nose or ears. Pt has had LD clamped since 0800 5/29, tolerated well.  IV: 2 PIV SL  Labs: K+ replaced x2 yesterday, AM redraws. Lab draws ok from foot.  Resp/trach: LS clear  Diet: Regular  Bowel status: LBM 5/28 took bowel meds last evening.  : Voiding   Skin: no deficits, LD site CDI   Pain: No HA reported. Sinus pressure 5/29 improved with zyrtec.   Activity: Up independently or SBA, calls appropriately.  Plan: Likely LD removal this morning. Possible discharge today.   Per OT, pt will need OT Vision Therapy upon discharge.

## 2022-05-31 ENCOUNTER — TELEPHONE (OUTPATIENT)
Dept: OPHTHALMOLOGY | Facility: CLINIC | Age: 62
End: 2022-05-31
Payer: COMMERCIAL

## 2022-05-31 ENCOUNTER — PATIENT OUTREACH (OUTPATIENT)
Dept: CARE COORDINATION | Facility: CLINIC | Age: 62
End: 2022-05-31
Payer: COMMERCIAL

## 2022-05-31 DIAGNOSIS — Z71.89 OTHER SPECIFIED COUNSELING: ICD-10-CM

## 2022-05-31 LAB
BACTERIA BLD CULT: NO GROWTH
BACTERIA CSF CULT: NO GROWTH
GRAM STAIN RESULT: NORMAL
GRAM STAIN RESULT: NORMAL
QUANTIFERON MITOGEN: 10 IU/ML
QUANTIFERON NIL TUBE: 0.06 IU/ML
QUANTIFERON TB1 TUBE: 0.03 IU/ML
QUANTIFERON TB2 TUBE: 0.16

## 2022-05-31 NOTE — TELEPHONE ENCOUNTER
Called and spoke to Parul     Made her an appointment for 6/2 @ 910 am with Dr. Beatty - Pt is aware of location, date and time     Jody Mccarty Communication Facilitator on 5/31/2022 at 9:00 AM

## 2022-05-31 NOTE — PROGRESS NOTES
Clinic Care Coordination Contact  [facility]: Post-Discharge Note  SITUATION                                                      Admission:    Admission Date: 05/27/22   Reason for Admission: Idiopathic intracranial hypertension  Discharge:   Discharge Date: 05/30/22  Discharge Diagnosis: Idiopathic intracranial hypertension    BACKGROUND                                                      Per hospital discharge summary and inpatient provider notes:    Parul Veliz is a 61 year old female on PTA ASA 81 with PMH HTN, asthma, and chronic headache who presented to OSH after 1 week of headaches that progressed to blurry vision starting on 5/24.  Given the persistence of blurry vision, she presented to OSH on 5/25.  She underwent LP at OSH with opening pressure 36 and then lumbar drain placement for IIH.  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  She was also started on antibiotics given one-time fever, and CSF was obtained.  She was transferred to [facility] for Ophthalmology evaluation on 5/27, after her blurry vision did not improve.             ASSESSMENT      Enrollment  Primary Care Care Coordination Status: Not a Candidate    Discharge Assessment  How are you doing now that you are home?: Patient reports she is doing well, no questions  How are your symptoms? (Red Flag symptoms escalate to triage hotline per guidelines): Improved  Do you feel your condition is stable enough to be safe at home until your provider visit?: Yes  Does the patient have their discharge instructions? : Yes  Does the patient have questions regarding their discharge instructions? : No  Were you started on any new medications or were there changes to any of your previous medications? : No  Discharge follow-up appointment scheduled within 14 calendar days? : Yes  Discharge Follow Up Appointment Date: 06/02/22  Discharge Follow Up Appointment Scheduled with?: Specialty Care Provider                  PLAN                                                       Outpatient Plan:    - You can follow up with Neurosurgery clinic as needed. Our clinic can be reached at [phone] to  schedule an appointment with the Neurosurgery teams, specifically Dr. Beckford.  - You should follow up with your PCP within 1-2 weeks.      Future Appointments   Date Time Provider Department Center   6/2/2022  9:10 AM Amaya Beatty MD [facility]         For any urgent concerns, please contact our 24 hour nurse triage line: [phone] ([phone])         CHRISTINE Iglesias  [phone]  [facility]

## 2022-06-01 LAB
GAMMA INTERFERON BACKGROUND BLD IA-ACNC: 0.06 IU/ML
M TB IFN-G BLD-IMP: NEGATIVE
M TB IFN-G CD4+ BCKGRND COR BLD-ACNC: 9.94 IU/ML
MITOGEN IGNF BCKGRD COR BLD-ACNC: -0.03 IU/ML
MITOGEN IGNF BCKGRD COR BLD-ACNC: 0.1 IU/ML

## 2022-06-02 ENCOUNTER — OFFICE VISIT (OUTPATIENT)
Dept: OPHTHALMOLOGY | Facility: CLINIC | Age: 62
End: 2022-06-02
Payer: COMMERCIAL

## 2022-06-02 DIAGNOSIS — H30.143 AMPIGINOUS CHOROIDITIS OF BOTH EYES: ICD-10-CM

## 2022-06-02 DIAGNOSIS — H43.813 PVD (POSTERIOR VITREOUS DETACHMENT), BOTH EYES: Primary | ICD-10-CM

## 2022-06-02 PROCEDURE — 99204 OFFICE O/P NEW MOD 45 MIN: CPT | Performed by: OPHTHALMOLOGY

## 2022-06-02 PROCEDURE — G0463 HOSPITAL OUTPT CLINIC VISIT: HCPCS

## 2022-06-02 PROCEDURE — 92134 CPTRZ OPH DX IMG PST SGM RTA: CPT | Performed by: OPHTHALMOLOGY

## 2022-06-02 PROCEDURE — 92242 FLUORESCEIN&ICG ANGIOGRAPHY: CPT | Performed by: OPHTHALMOLOGY

## 2022-06-02 ASSESSMENT — SLIT LAMP EXAM - LIDS
COMMENTS: NORMAL
COMMENTS: NORMAL

## 2022-06-02 ASSESSMENT — CONF VISUAL FIELD
OS_NORMAL: 1
METHOD: COUNTING FINGERS
OD_NORMAL: 1

## 2022-06-02 ASSESSMENT — REFRACTION_WEARINGRX
OS_SPHERE: PLANO
OS_CYLINDER: +1.50
OD_AXIS: 012
OD_CYLINDER: +0.75
SPECS_TYPE: PAL
OD_SPHERE: -1.00
OS_ADD: +2.50
OS_AXIS: 020
OD_ADD: +2.50

## 2022-06-02 ASSESSMENT — EXTERNAL EXAM - LEFT EYE: OS_EXAM: NORMAL

## 2022-06-02 ASSESSMENT — VISUAL ACUITY
OS_CC: 20/125
OD_CC: 20/50
METHOD: SNELLEN - LINEAR

## 2022-06-02 ASSESSMENT — TONOMETRY
OS_IOP_MMHG: 11
OD_IOP_MMHG: 14
IOP_METHOD: TONOPEN

## 2022-06-02 ASSESSMENT — CUP TO DISC RATIO
OD_RATIO: 0.2
OS_RATIO: 0.2

## 2022-06-02 ASSESSMENT — EXTERNAL EXAM - RIGHT EYE: OD_EXAM: NORMAL

## 2022-06-02 NOTE — Clinical Note
Mika Snowden, this is the patient I text you about. Thank you for agreeing seeing her next week. I cc rodríguez and celina to help putting her on your scheduled early next week. Very nice patient.  Btw she received some orally steroids and I did not restart them. Waiting to hear from you Take a look at my note Thanks jose

## 2022-06-02 NOTE — PROGRESS NOTES
CC: retina consult  First viki with me  Interval: VA still blurry both eyes; no flashes and floaters ; no new headaches  HPI: Parul Veliz is a 61 year old female who was transferred to the hospital for ophthalmology evaluation of decreased vision in both eyes. On Tuesday May 24 she had difficulty reading her computer screen and she thought she was just tired. Then on Wednesday she woke up with it and it was blurry everywhere in her central vision. She closed her right eye and noticed she has an oval shaped blind spot in her superior central vision. In her right eye she has a gray blind spot dead in the center. She is able to see everything perfectly surrounding these areas though. She says her peripheral vision is fine, its just the central area that is obscured. She says her vision is not distorted but just blurred, especially up close and says she has an easier time reading farther away.   She denies history of diabetes.   She denies floaters, pain, transient visual obscurations, changes in color vision, or whooshing noises in her ears. She says she has had headaches all her life that are normally a dull pain in the frontal lobes.   The headaches she have been having lately are more constant pain in the back of her head that have gone away with tylenol. She has had visual auras with migraines before which usually look like half rainbows that only last for a short time. She says her current symptoms do not look anything like these. She has been seeing what look like red and gray webbed things that look like nerves that move around in her vision when she closes her eyes.       She  presented to [facility] and had an lumbar puncture (May 26, 2022)  completed which showed and elevated opening pressure of 31 cm H2O along with starting diamox 500 mg PO BID and methylprednisolone 250 mg PO QID for a 5 day taper. She had a lumbar drain placed and has been having fluid taken off throughout the day with some  improvement in her headache. She said when it was initially placed she had a significant benefit and now it has somewhat plateaued.     Past ocular history: wears glasses and has had a PVD in her left eye 1.5 years ago but did not have any complications requiring laser. She was born 8 weeks prematurely but never had any eye exam when she was a baby.    PMH:  history of cervical cancer s/p radical hysterectomy and breast cancer s/p double mastectomy in 2014. She says she is cancer free at this time and is done with her monitoring.      Review of systems were otherwise negative except for that which has been stated above.  Denies pets, no history of STD, no traveling outside [place], no recent viral infection, no tick bites  Had covid vaccines late last year and patient wonders if any association- doubt given that vaccines were administered late last yr     Retinal Imaging: 2022  OCT 22  RE: foveal contour present with prominent sub-foveal Retinal pigment epithelium disruption and Ellipsoid Zone  loss, outer retinal atrophy superior to fovea with drusen or possible thin PED, no IRF, choroid appears relatively normal with normal thickness,   LE: foveal contour distorted by sub-foveal EZ loss with underlying RPE thinning, focal areas of outer retinal loss throughout macula with thin PED inferiorly    FA & IC22  right eye: multifocal macular lesions block early on both FA and ICG with late staining/leakage at the edges of the larger lesions and through the middle of the smaller lesions. On ICG the lesions block through late frames. No disc hyperfluoresence, no vascular leakage. No vasculitis. Peripheral blotchy hyperfluorescence without leakage on fluorescein angiography consistent with window's defects/ staining in both eyes.appear inactive. indocyanine green showed hypofluorescence of those peripheral lesions.        HEAD MRV:  1.  Severe stenosis of the distal transverse sinuses bilaterally, which  is nonspecific but can be seen in setting of idiopathic intracranial hypertension.  2.  No additional cervical stenosis or occlusion. No thrombosis.     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   Vascular occlusion is not present on FA today, and HIV/TB/Syphilis/lyme ruled out by recent labs.     PLAN  Could consider Systemic or periocular corticosteroids. Patient had received methylprednisolone 250 mg PO QID for a 5 day taper.   Might consider IMT in the future based on activity of the retina lesions.  Patient will follow up with Dr. Shipman early next week  Consider additional labs consider ANCA and Anti-nuclear antibody, PPD, CMV, lisozyme  Discussed with patient possibility of choroidal neovascular membrane formation in the future    # Idiopathic Intracranial hypertension  History of chronic headaches with worsening in the past few weeks   lumbar puncture (May 26, 2022)  With elevated opening pressure of 31 cm H2O   Head MRV:  Severe stenosis of the distal transverse sinuses bilaterally, which is nonspecific but can be seen in setting of idiopathic intracranial hypertension.  - She does not  have disc edema in either eye, so monitoring for intracranial hypertension treatment/ recurrence can not be done based on optic nerve appearance.   Could consider baseline neurological visual fields.   AMPPE could be associated with CNS vasculitis. Given elevated inflammatory markers, could consider ANCA and QI to rule out the small possibility of CNS vasculitis in relation with increased ICP.  However it is possible that her new eye condition and IIH are not related    PLAN:  Continue current medications   Patient was started on Diamox 500 twice a day   Weight loss recommended  Patient will follow up with internal medicine on Monday     ~~~~~~~~~~~~~~~~~~~~~~~~~~~~~~~~~~   Complete documentation of historical and exam elements from today's encounter can be found in the full encounter summary report (not reduplicated in this progress note).  I personally obtained the chief complaint(s) and history of present illness.  I confirmed and edited as necessary the review of systems, past medical/surgical history, family history, social history, and examination findings as documented by others; and I examined the patient myself.  I personally reviewed the relevant tests, images, and reports as documented above.  I formulated and edited as necessary the assessment and plan and discussed the findings and management plan with the patient and family    Amaya Beatty MD   of Ophthalmology.  Retina Service   Department of Ophthalmology and Visual Neurosciences   [facility]  Phone: [phone]   Fax: [phone]       45 minutes of face-to-face time was spent with the patient, with her entire evaluation spent obtaining a medical history and reviewing her electronic medical record, imaging and counceling.

## 2022-06-02 NOTE — NURSING NOTE
Chief Complaints and History of Present Illnesses   Patient presents with     Er F/u     Pt here today for assessment of missing vision spots in both eyes.  Spots look like grey or dim images in vision of both eyes.  Had sudden onset last Wednesday - has been constant since.  Was seen in ER on last Friday and feels vision has gotten worse since.  Pt denies any pain or discomfort with the exception of during a HA.    Ocular meds = none    Anju ROSS RACHID 9:41 AM 06/02/2022            Chief Complaint(s) and History of Present Illness(es)     Er F/u     Laterality: both eyes    Onset: sudden    Quality: missing vision and spots in vision    Frequency: constantly    Associated symptoms: headache, flashes and floaters (historic).  Negative for eye pain, nausea and vomiting    Treatments tried: no treatments    Comments: Pt here today for assessment of missing vision spots in both eyes.  Spots look like grey or dim images in vision of both eyes.  Had sudden onset last Wednesday - has been constant since.  Was seen in ER on last Friday and feels vision has gotten worse since.  Pt denies any pain or discomfort with the exception of during a HA.    Ocular meds = none    Anju ROSS, RACHID 9:41 AM 06/02/2022

## 2022-06-09 LAB — BACTERIA CSF CULT: NORMAL

## 2022-06-15 ENCOUNTER — OFFICE VISIT (OUTPATIENT)
Dept: OPHTHALMOLOGY | Facility: CLINIC | Age: 62
End: 2022-06-15
Attending: OPHTHALMOLOGY
Payer: COMMERCIAL

## 2022-06-15 DIAGNOSIS — H30.143 AMPIGINOUS CHOROIDITIS OF BOTH EYES: Primary | ICD-10-CM

## 2022-06-15 DIAGNOSIS — Z79.899 HIGH RISK MEDICATION USE: ICD-10-CM

## 2022-06-15 DIAGNOSIS — G93.2 IIH (IDIOPATHIC INTRACRANIAL HYPERTENSION): ICD-10-CM

## 2022-06-15 PROCEDURE — G0463 HOSPITAL OUTPT CLINIC VISIT: HCPCS | Mod: 25

## 2022-06-15 PROCEDURE — 92134 CPTRZ OPH DX IMG PST SGM RTA: CPT | Performed by: OPHTHALMOLOGY

## 2022-06-15 PROCEDURE — 92134 CPTRZ OPH DX IMG PST SGM RTA: CPT | Mod: 26

## 2022-06-15 PROCEDURE — 92133 CPTRZD OPH DX IMG PST SGM ON: CPT | Performed by: OPHTHALMOLOGY

## 2022-06-15 PROCEDURE — 92250 FUNDUS PHOTOGRAPHY W/I&R: CPT | Mod: 59 | Performed by: OPHTHALMOLOGY

## 2022-06-15 PROCEDURE — 99207 FUNDUS PHOTOS OU (BOTH EYES): CPT | Mod: 26

## 2022-06-15 PROCEDURE — 99214 OFFICE O/P EST MOD 30 MIN: CPT | Mod: GC

## 2022-06-15 RX ORDER — PREDNISONE 20 MG/1
TABLET ORAL
Qty: 90 TABLET | Refills: 1 | Status: SHIPPED | OUTPATIENT
Start: 2022-06-15 | End: 2022-07-27

## 2022-06-15 ASSESSMENT — CONF VISUAL FIELD
OS_NORMAL: 1
OD_NORMAL: 1
METHOD: COUNTING FINGERS

## 2022-06-15 ASSESSMENT — SLIT LAMP EXAM - LIDS
COMMENTS: NORMAL
COMMENTS: NORMAL

## 2022-06-15 ASSESSMENT — EXTERNAL EXAM - RIGHT EYE: OD_EXAM: NORMAL

## 2022-06-15 ASSESSMENT — TONOMETRY
OS_IOP_MMHG: 13
IOP_METHOD: TONOPEN
OD_IOP_MMHG: 12

## 2022-06-15 ASSESSMENT — REFRACTION_WEARINGRX
OD_CYLINDER: +0.75
OS_ADD: +2.50
OD_SPHERE: -1.00
SPECS_TYPE: PAL
OS_AXIS: 020
OS_SPHERE: PLANO
OD_AXIS: 012
OS_CYLINDER: +1.50
OD_ADD: +2.50

## 2022-06-15 ASSESSMENT — VISUAL ACUITY
OD_CC: 20/100
OS_CC: 20/250
METHOD: SNELLEN - LINEAR
CORRECTION_TYPE: GLASSES

## 2022-06-15 ASSESSMENT — CUP TO DISC RATIO
OD_RATIO: 0.3
OS_RATIO: 0.3

## 2022-06-15 ASSESSMENT — EXTERNAL EXAM - LEFT EYE: OS_EXAM: NORMAL

## 2022-06-15 NOTE — PROGRESS NOTES
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 Beatty.    The relevant history began when Ms. Rodríguez first noticed her symptoms May 24th, 2022 when she felt that she had blurred central vision in left eye. At that time, as she checked one eye at a time, she noted that she had a central blurry spot in the left eye but also a blind spot in the right eye. She did not recall any associated floaters, flickering light sensation at that time.    She had presented to the [facility] May 25th for the blurred vision and headache. She has chronic headaches but reported worsening headaches in the weeks prior to presentation.  She had an MRI and MRV which showed nonspecific transverse sinus stenosis.  Lumbar puncture showed elevated opening pressure of 31 cm of water. Ms. Veliz was given the diagnosis of elevated intracranial hypertension and started on oral acetazolamide 500 mg twice daily which has continued since that stay.  She also received a few days of dexamethasone in the hospital.    Ms. Veliz reports did not recall recent tick bites nor recent viral infections.  She was evaluated by my partner Dr. Octavio Beatty on June 2, 2022 who identified posterior uveitis with some concern for ampiginous choroiditis.  There was no significant disc edema noted by Dr. Beatty.  She recommended continued use of oral acetazolamide and to follow-up for this visit to discuss possible prednisone or steroid eye injections.    In the interim, Ms. Veliz reports the vision seems to be worse in both eyes.    Additional Ocular History:   1. Idiopathic intracranial hypertension.Chronic headaches that were worsening for weeks prior to admission in May. LP done May 26th, 2022 with elevated opening pressure to 31cm H2O. MRV showing severe stenosis of the distal transverse sinuses bilaterally No  disc edema. No pulsatile tinnitus    2. Migraine with visual auras--Presents as halos of colored lights that last for a few minutes     3. Posterior vitreous detachment left eye.  Previously evaluated at [facility]     Relevant Past Medical/Family/Social History:  1.  History of cervical cancer s/p radical hysterectomy  2.  Treated for  breast cancer s/p double mastectomy in 2014. Screening no longer indicated  3.  Takes inhaler as needed for exercise induced asthma  4.  Hypertension.   5.  Has been vaccinated against COVID-19    Here with her Mother. 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 Recently returned from [place], [place] before this started, but no recent international travel    Relevant Review of Systems: Has chronic headaches but no pulsatile tinnitus. Knee pain stable for some time. No cough/cold symptoms. No trouble breathing, no unintended weight loss,     Laboratory Testing  Abnormal: ESR elevated to 62, CRP elevated to 7.1 (normal <0.8). Platelets mildly elevated to 262.   Normal/negative: Syphilis, Quant Gold, CBC (except platelets), CMP, CSF HSV PCR, CSF VCV PCR, Lyme IgG/IgM, AST/ALT, ACE      Current eye related medications: Diamox 500 mg twice daily    Retina/Uveitis Imaging:  RNFL June 16, 2022  right eye: Average thickness 119  m, minimal thickening.  left eye: Average thickness 124  m, minimal thickening.    OCT Spectralis Macula Concepcion 15, 2022  right eye: areas of attenuation of outer plexiform layer with some improved hyper reflective areas.  No CNV  left eye: Increased reflectivity of ellipsoid zone layers.  Some improved outer retinal hyper reflectivity    Optos Fundus Autofluorescence Concepcion 15, 2022  right eye: Increased area of hyper autofluorescence in the macula   left eye: Increased area of hyper autofluorescence in the macula     Optos Fundus Photos OU (both eyes) Concepcion 15, 2022  right eye: Placoid macular lesions more confluent   left eye: Placoid  macular lesions more confluent    OCT -Angiography Concepcion 15, 2022  right eye: No significant flow-voids in the choroiocapillaris  left eye: No significant flow-voids in the choriocapillaris    Optos FA  June 2nd, 2022--reviewed  right eye: Transit right eye. Choroidal flush shows blotchy hypoperfusion of macular lesions. Early hypoF of macular lesions with late staining around boarder of larger lesions. Faint leakage from smaller macular lesions.   left eye: Early hyopF of macular lesions with late staining around border of larger lesions. Faint expansion of fluorescence around smaller lesions consistent with leakage on very late frames.     Optos ICG June 2nd, 2022--reviewed  right eye: HypoF spots (much more numerous than lesions on FAF) which are near confluent with a predilection for central macula.   left eye: HypoF spots  (much more numerous than lesions on FAF) which are near confluent with a predilection for central macula.     Assessment:    1. Ampiginous choroiditis of both eyes  With mild vitreous haze in both eyes.  OCT shows improving outer retinal hyper reflectivity but autofluorescence shows increased activity in the macula of both eyes.  No evidence of pericapillary nor macular choroidal neovascularization    2. IIH (idiopathic intracranial hypertension)  Primarily based on diagnosis of headaches with slight elevation of intracranial pressure by lumbar puncture.  No clinical disc edema today    3. High risk medication use  Oral acetazolamide 500 mg twice daily    Plan/Recommendations:    Discussed findings with patient and her Mother.  Agree the lesions in the macula with a coalescing appearance do seem to be on the spectrum of Ampiginous choroiditis.  We discussed that this is a condition with manifestations similar to APMPPE as well as serpiginous choroidopathy.  MRA without evidence of cerebral vasculitis which can be seen in APMPPE    Laboratory evaluation has been unrevealing for systemic  infections such as tuberculosis or syphilis which can cause similar changes.  Recommend aggressive oral prednisone as well as laboratory testing in preparation for long-term steroid sparing therapy    We discussed steroid eye injections but given the bilateral central involving inflammation, recommend medical therapy first    We did not obtain visual field testing but there is no clinical optic disc edema.  Given that there are likely to be central visual field defects related to the uveitis affecting the macula, we will hold off on visual field testing at this time particularly given that the RNFL scan also shows minimal thickening above normal range.  It is most likely that the disc edema is coincident with the macular uveitis and not an independent process.  We will begin tapering oral acetazolamide but can consider increasing the dose or referral to neuro-ophthalmology if there is recurrence of the disc edema and spite of improved control of the uveitis    Eye pressure is normal, so no drops needed for eye pressure lowering.    Reduce Acetazolamide (Diamox) to one pill daily    No eye drops needed for inflammation as we will start oral prednisone    Start a new pill called prednisone for inflammation. Take 3 pills (60 mg total) Each AM with Food until next visit.  We discussed potential benefits and risks    The course of the uveitis is unknown at this time but given the absence of flow-voids in the choriocapillaris and the early improvement in the outer retinal hyper reflectivity on OCT, the aggressive oral prednisone may help to treat the choroidal inflammation which can hopefully improve the overlying retinal changes.  We did not discuss in detail but there are areas of retinal atrophy which are unlikely to improve significantly with oral prednisone nor with long-term steroid sparing therapy.  Our primary goal is stability of the current level of vision and reduction of further vision loss.  We will fill out  forms as requested for accommodations based on visual needs at work    Please get these labs when you see your primary care physician: ANCA, Coxsackie A, Coxsackie B, Hepatitis B, Hepatitis C, TPMT Enzyme.  We may ultimately consider medication such as mycophenolate or azathioprine to help reduce the likelihood of long-term complications and further vision loss    Celso Frank M.D.  Retina Fellow    RTC Return for 1 week tonopen, dilate, OCT, Photos (ordered).     Attending Physician Attestation:  Complete documentation of historical and exam elements from today's encounter can be found in the full encounter summary report (not reduplicated in this progress note). I reviewed the chief complaint(s) and history of present illness, and  confirmed and edited as necessary the review of systems, past medical/surgical history, family history, social history, and examination findings as documented by others and the treating Resident or Fellow Physician.    I examined the patient myself, discussed the findings, reviewed all ancillary testing data and modified these results and reports along with the assessment and plan with the Treating Resident or Fellow Physician. I agree with the note as detailed above.   Sp Shipman M.D.  Uveitis and Medical Retina  June 16, 2022

## 2022-06-15 NOTE — LETTER
6/15/2022       RE: Parul Veliz  [address] [facility] [address]     Dear Colleague,    Thank you for referring your patient, Parul Veliz, to the [facility] [place] at [facility]. Please see a copy of my visit note below.    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 Beatty.    The relevant history began when Ms. Rodríguez first noticed her symptoms May 24th, 2022 when she felt that she had blurred central vision in left eye. At that time, as she checked one eye at a time, she noted that she had a central blurry spot in the left eye but also a blind spot in the right eye. She did not recall any associated floaters, flickering light sensation at that time.    She had presented to the [facility] May 25th for the blurred vision and headache. She has chronic headaches but reported worsening headaches in the weeks prior to presentation.  She had an MRI and MRV which showed nonspecific transverse sinus stenosis.  Lumbar puncture showed elevated opening pressure of 31 cm of water. Ms. Veliz was given the diagnosis of elevated intracranial hypertension and started on oral acetazolamide 500 mg twice daily which has continued since that stay.  She also received a few days of dexamethasone in the hospital.    Ms. Veliz reports did not recall recent tick bites nor recent viral infections.  She was evaluated by my partner Dr. Octavio Beatty on June 2, 2022 who identified posterior uveitis with some concern for ampiginous choroiditis.  There was no significant disc edema noted by Dr. Beatty.  She recommended continued use of oral acetazolamide and to follow-up for this visit to discuss possible prednisone or steroid eye injections.    In the interim, Ms. Veliz reports the vision seems to  be worse in both eyes.    Additional Ocular History:   1. Idiopathic intracranial hypertension.Chronic headaches that were worsening for weeks prior to admission in May. LP done May 26th, 2022 with elevated opening pressure to 31cm H2O. MRV showing severe stenosis of the distal transverse sinuses bilaterally No disc edema. No pulsatile tinnitus    2. Migraine with visual auras--Presents as halos of colored lights that last for a few minutes     3. Posterior vitreous detachment left eye.  Previously evaluated at Vitreoretinal Surgery     Relevant Past Medical/Family/Social History:  1.  History of cervical cancer s/p radical hysterectomy  2.  Treated for  breast cancer s/p double mastectomy in 2014. Screening no longer indicated  3.  Takes inhaler as needed for exercise induced asthma  4.  Hypertension.   5.  Has been vaccinated against COVID-19    Here with her Mother. 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 Recently returned from [place], [place] before this started, but no recent international travel    Relevant Review of Systems: Has chronic headaches but no pulsatile tinnitus. Knee pain stable for some time. No cough/cold symptoms. No trouble breathing, no unintended weight loss,     Laboratory Testing  Abnormal: ESR elevated to 62, CRP elevated to 7.1 (normal <0.8). Platelets mildly elevated to 262.   Normal/negative: Syphilis, Quant Gold, CBC (except platelets), CMP, CSF HSV PCR, CSF VCV PCR, Lyme IgG/IgM, AST/ALT, ACE      Current eye related medications: Diamox 500 mg twice daily    Retina/Uveitis Imaging:  RNFL June 16, 2022  right eye: Average thickness 119  m, minimal thickening.  left eye: Average thickness 124  m, minimal thickening.    OCT Spectralis Macula Concepcion 15, 2022  right eye: areas of attenuation of outer plexiform layer with some improved hyper reflective areas.  No CNV  left eye: Increased reflectivity of ellipsoid zone layers.  Some improved outer retinal hyper  reflectivity    Optos Fundus Autofluorescence Concepcion 15, 2022  right eye: Increased area of hyper autofluorescence in the macula   left eye: Increased area of hyper autofluorescence in the macula     Optos Fundus Photos OU (both eyes) Concepcion 15, 2022  right eye: Placoid macular lesions more confluent   left eye: Placoid macular lesions more confluent    OCT -Angiography Concepcion 15, 2022  right eye: No significant flow-voids in the choroiocapillaris  left eye: No significant flow-voids in the choriocapillaris      Optos FA  June 2nd, 2022--reviewed  right eye: Transit right eye. Choroidal flush shows blotchy hypoperfusion of macular lesions. Early hypoF of macular lesions with late staining around boarder of larger lesions. Faint leakage from smaller macular lesions.   left eye: Early hyopF of macular lesions with late staining around border of larger lesions. Faint expansion of fluorescence around smaller lesions consistent with leakage on very late frames.     Optos ICG June 2nd, 2022--reviewed  right eye: HypoF spots (much more numerous than lesions on FAF) which are near confluent with a predilection for central macula.   left eye: HypoF spots  (much more numerous than lesions on FAF) which are near confluent with a predilection for central macula.     Assessment:    1. Ampiginous choroiditis of both eyes  With mild vitreous haze in both eyes.  OCT shows improving outer retinal hyper reflectivity but autofluorescence shows increased activity in the macula of both eyes.  No evidence of pericapillary nor macular choroidal neovascularization    2. IIH (idiopathic intracranial hypertension)  Primarily based on diagnosis of headaches with slight elevation of intracranial pressure by lumbar puncture.  No clinical disc edema today    3. High risk medication use  Oral acetazolamide 500 mg twice daily    Plan/Recommendations:    Discussed findings with patient and her Mother.  Agree the lesions in the macula with a coalescing  appearance do seem to be on the spectrum of Ampiginous choroiditis.  We discussed that this is a condition with manifestations similar to APMPPE as well as serpiginous choroidopathy.  MRA without evidence of cerebral vasculitis which can be seen in APMPPE    Laboratory evaluation has been unrevealing for systemic infections such as tuberculosis or syphilis which can cause similar changes.  Recommend aggressive oral prednisone as well as laboratory testing in preparation for long-term steroid sparing therapy    We discussed steroid eye injections but given the bilateral central involving inflammation, recommend medical therapy first    We did not obtain visual field testing but there is no clinical optic disc edema.  Given that there are likely to be central visual field defects related to the uveitis affecting the macula, we will hold off on visual field testing at this time particularly given that the RNFL scan also shows minimal thickening above normal range.  It is most likely that the disc edema is coincident with the macular uveitis and not an independent process.  We will begin tapering oral acetazolamide but can consider increasing the dose or referral to neuro-ophthalmology if there is recurrence of the disc edema and spite of improved control of the uveitis    Eye pressure is normal, so no drops needed for eye pressure lowering.    Reduce Acetazolamide (Diamox) to one pill daily    No eye drops needed for inflammation as we will start oral prednisone    Start a new pill called prednisone for inflammation. Take 3 pills (60 mg total) Each AM with Food until next visit.  We discussed potential benefits and risks        The course of the uveitis is unknown at this time but given the absence of flow-voids in the choriocapillaris and the early improvement in the outer retinal hyper reflectivity on OCT, the aggressive oral prednisone may help to treat the choroidal inflammation which can hopefully improve the  overlying retinal changes.  We did not discuss in detail but there are areas of retinal atrophy which are unlikely to improve significantly with oral prednisone nor with long-term steroid sparing therapy.  Our primary goal is stability of the current level of vision and reduction of further vision loss.  We will fill out forms as requested for accommodations based on visual needs at work    Please get these labs when you see your primary care physician: ANCA, Coxsackie A, Coxsackie B, Hepatitis B, Hepatitis C, TPMT Enzyme.  We may ultimately consider medication such as mycophenolate or azathioprine to help reduce the likelihood of long-term complications and further vision loss    Celso Frank M.D.  Retina Fellow    RTC Return for 1 week tonopen, dilate, OCT, Photos (ordered).     Attending Physician Attestation:  Complete documentation of historical and exam elements from today's encounter can be found in the full encounter summary report (not reduplicated in this progress note). I personally obtained the chief complaint(s) and history of present illness. I confirmed and edited as necessary the review of systems, past medical/surgical history, family history, social history, and examination findings as documented by others; and I examined the patient myself. I personally reviewed the relevant tests, images, and reports as documented above. I formulated and edited as necessary the assessment and plan and discussed the findings and management plan with the patient and family.  Sp Shipman MD.      Sincerely,    Sp Shipman MD  [facility] Dept of Ophthalmology  Uveitis and Medical Retina

## 2022-06-15 NOTE — NURSING NOTE
Chief Complaints and History of Present Illnesses   Patient presents with     Uveitis Evaluation     New consult. Being sent over by Dr. Beatty.     Chief Complaint(s) and History of Present Illness(es)     Uveitis Evaluation     Laterality: both eyes    Comments: New consult. Being sent over by Dr. Beatty.              Comments     Patient states that her vision Is blurry in both eyes. She states that when she reads she has to make the font big in order to see it. She states that when she saw Dr. Beatty she could read at near with some difficulty, but now she needs to magnify things in order to see them. Needs light to read things. She states that when she was sitting outside, it looked like swirls of bust in her vision. She states that she can not have computer screens to bright.   No flashes of light. No floaters.     Ocular Meds: none       Blu WEAVER, Concepcion 15, 2022 12:52 PM

## 2022-06-15 NOTE — PATIENT INSTRUCTIONS
Reduce Acetazolamide (Diamox) to one pill daily  No eye drops needed  Start a new pill called prednisone for inflammation. Take 3 pills (60 mg total) Each AM with Food until next visit  Please get these labs when you see your primary care physician: ANCA, Coxsackie A, Coxsackie B, Hepatitis B, Hepatitis C, TPMT Enzyme

## 2022-06-22 ENCOUNTER — OFFICE VISIT (OUTPATIENT)
Dept: OPHTHALMOLOGY | Facility: CLINIC | Age: 62
End: 2022-06-22
Attending: OPHTHALMOLOGY
Payer: COMMERCIAL

## 2022-06-22 DIAGNOSIS — Z79.899 HIGH RISK MEDICATION USE: ICD-10-CM

## 2022-06-22 DIAGNOSIS — H30.143 AMPIGINOUS CHOROIDITIS OF BOTH EYES: Primary | ICD-10-CM

## 2022-06-22 DIAGNOSIS — G93.2 IIH (IDIOPATHIC INTRACRANIAL HYPERTENSION): ICD-10-CM

## 2022-06-22 PROCEDURE — 92250 FUNDUS PHOTOGRAPHY W/I&R: CPT | Performed by: OPHTHALMOLOGY

## 2022-06-22 PROCEDURE — 99207 FUNDUS PHOTOS OU (BOTH EYES): CPT | Mod: 26 | Performed by: OPHTHALMOLOGY

## 2022-06-22 PROCEDURE — 92134 CPTRZ OPH DX IMG PST SGM RTA: CPT | Performed by: OPHTHALMOLOGY

## 2022-06-22 PROCEDURE — 92134 CPTRZ OPH DX IMG PST SGM RTA: CPT | Mod: 26 | Performed by: OPHTHALMOLOGY

## 2022-06-22 PROCEDURE — G0463 HOSPITAL OUTPT CLINIC VISIT: HCPCS | Mod: 25

## 2022-06-22 PROCEDURE — 99213 OFFICE O/P EST LOW 20 MIN: CPT | Mod: GC | Performed by: OPHTHALMOLOGY

## 2022-06-22 ASSESSMENT — SLIT LAMP EXAM - LIDS
COMMENTS: NORMAL
COMMENTS: NORMAL

## 2022-06-22 ASSESSMENT — REFRACTION_WEARINGRX
SPECS_TYPE: PAL
OD_AXIS: 012
OS_SPHERE: PLANO
OS_CYLINDER: +1.50
OD_SPHERE: -1.00
OD_ADD: +2.50
OS_AXIS: 020
OS_ADD: +2.50
OD_CYLINDER: +0.75

## 2022-06-22 ASSESSMENT — CONF VISUAL FIELD
OD_NORMAL: 1
METHOD: COUNTING FINGERS
OS_NORMAL: 1

## 2022-06-22 ASSESSMENT — VISUAL ACUITY
OS_CC: 20/500
METHOD: SNELLEN - LINEAR
CORRECTION_TYPE: GLASSES
OS_PH_CC: 20/300
OD_CC: 20/150

## 2022-06-22 ASSESSMENT — CUP TO DISC RATIO
OS_RATIO: 0.3
OD_RATIO: 0.3

## 2022-06-22 ASSESSMENT — EXTERNAL EXAM - RIGHT EYE: OD_EXAM: NORMAL

## 2022-06-22 ASSESSMENT — TONOMETRY
OD_IOP_MMHG: 17
IOP_METHOD: TONOPEN
OS_IOP_MMHG: 18

## 2022-06-22 ASSESSMENT — EXTERNAL EXAM - LEFT EYE: OS_EXAM: NORMAL

## 2022-06-22 NOTE — PROGRESS NOTES
Chief Complaint/Presenting Concern:  Uveitis follow up    Interval History of Present Ocular Illness:  Parul Veliz is a 62 year old patient who returns for follow up of her posterior uveitis of both eyes. Following her last visit last week on 6/15/2022, she was started on 60 mg prednisone. Subjectively she feels that she sees sees better details and color at distance since last week. She still has scotomas, more evident to her at near. She continues to not have any associated floaters or flicker of light sensation. Sunlight is more visibly detectable. Overall, having more light helps.     Interval Updates to Medical/Family/Social History:    Her air conditioning went out yesterday but will be serviced soon. Got forms for work, still working on accomodations    Relevant Review of Systems Updates: Mild nausea and constipation with steroid pills. No vomiting. She is having trouble sleeping and will be up for a few hours each night. No headaches.     Laboratory Testing June 2022  Abnormal: Coxsackie A9 IgG (1:200 titer, nl <1:100), A16 IgG  (1:200 titer, nl <1:100), A24IgG  (1:200 titer, nl <1:100),  Normal/negative: Coxsakie A7 IgG, A7 IgM, A9 IgM, A16IgM, A 24 IgM, Hep C Antibody, ANCA, Anti HBS Quant     Current eye related medications: Prednisone 60 mg daily,Diamox 500 mg once daily.     Retina/Uveitis Imaging:   RNFL Concepcion 15, 2022  right eye: Avg thickness 119 microns  Left eye: Avg thickness 124 microns    OCT Spectralis Macula June 22, 2022  right eye: Focal areas of improved outer retinal hyper reflectivity.  Irregular outer segment bands/PEDs with some interval atrophy.  No macular nor peripapillary fluid  left eye: Some increased height of pigment epithelial detachment, stable area of atrophy, but no obvious fluid.  No macular nor peripapillary fluid.     Optos Fundus Autofluorescence June 22, 2022  right eye: Macular area of stable hyper autofluorescence, some interval increase in areas of  hypoautofluorescence  left eye: Macular area of stable hyper autofluorescence, some interval increase in areas of hypoautofluorescence    Optos Fundus Photos OU (both eyes) June 22, 2022  right eye:Nearly confluent cream-colored spots in the macula with some interval increase in pigment  left eye: Nearly confluent cream-colored spots in the macula with some interval increase in pigment    Assessment:     1. Ampiginous choroiditis of both eyes  Interval increase in macular pigment, some interval atrophy, but NO CNVM    2. IIH (idiopathic intracranial hypertension)   Despite having elevated ICP noted on lumbar puncture, no disc edema, no pulsatile tinnitus    3. High risk medication use  Prednisone 60 mg daily, Oral Diamox 500 mg once daily.     Plan/Recommendations:      Discussed findings with patient and her Mother. She has been tolerating the high dose oral prednisone well. Subjectively, she feels there has been an improvement in the quality of her vision.    Exam shows increased more pigmentation within the placoid lesions. OCT and autofluorescence pattern have remained stable since last visit. It is our hope to stabilize the progression and see improvement in the lesions and her vision. We discussed the unknown prognosis at this time but will continue the current course. It is too early to determine if long term steroid sparing therapy will be needed, but we should be able to reassess at next visit.    Eye pressure is normal at 17/18, so no drops needed for eye pressure lowering    Recommend additional testing: None at this time.     Continue Prednisolone 60 mg daily    Continue Acetazolamide 500 mg daily without reduction given need for ongoing prednisone    RTC Return for 2 weeks tonopen, dilate, OCT, RNFL, Photos (ordered).     Celso Frank MD  Vitreoretinal Surgery Fellow  [facility]     Attending Physician Attestation:  Complete documentation of historical and exam elements from today's  encounter can be found in the full encounter summary report (not reduplicated in this progress note). I reviewed the chief complaint(s) and history of present illness, and  confirmed and edited as necessary the review of systems, past medical/surgical history, family history, social history, and examination findings as documented by others and the treating Resident or Fellow Physician.    I examined the patient myself, discussed the findings, reviewed all ancillary testing data and modified these results and reports along with the assessment and plan with the Treating Resident or Fellow Physician. I agree with the note as detailed above.   Sp Shipman M.D.  Uveitis and Medical Retina  June 22, 2022

## 2022-06-22 NOTE — NURSING NOTE
Chief Complaints and History of Present Illnesses   Patient presents with     Retinitis/choroiditis Follow Up     Chief Complaint(s) and History of Present Illness(es)     Retinitis/choroiditis Follow Up     Laterality: both eyes    Onset: gradual    Onset: months ago    Quality: Feels the va has a slight improvement     Severity: moderate    Frequency: intermittently    Associated symptoms: photophobia (not new).  Negative for flashes and floaters    Treatments tried: no treatments    Pain scale: 0/10              Comments     Here for Ampiginous choroiditis of both eyes   Changes in color for the better  Diamox every day   Prednisone 60 mg   Janina Webster COT 8:48 AM June 22, 2022

## 2022-06-22 NOTE — LETTER
6/22/2022       RE: Parul Veliz  [address] [facility] [address] [place] [address]     Dear Colleague,    Thank you for referring your patient, Parul Veliz, to the [facility] [place] at [facility]. Please see a copy of my visit note below.    Chief Complaint/Presenting Concern:  Uveitis follow up    Interval History of Present Ocular Illness:  Parul Veliz is a 62 year old patient who returns for follow up of her posterior uveitis of both eyes. Following her last visit last week on 6/15/2022, she was started on 60 mg prednisone. Subjectively she feels that she sees sees better details and color at distance since last week. She still has scotomas, more evident to her at near. She continues to not have any associated floaters or flicker of light sensation. Sunlight is more visibly detectable. Overall, having more light helps.     Interval Updates to Medical/Family/Social History:    Her air conditioning went out yesterday but will be serviced soon. Got forms for work, still working on accomodations    Relevant Review of Systems Updates: Mild nausea and constipation with steroid pills. No vomiting. She is having trouble sleeping and will be up for a few hours each night. No headaches.     Laboratory Testing June 2022  Abnormal: Coxsackie A9 IgG (1:200 titer, nl <1:100), A16 IgG  (1:200 titer, nl <1:100), A24IgG  (1:200 titer, nl <1:100),  Normal/negative: Coxsakie A7 IgG, A7 IgM, A9 IgM, A16IgM, A 24 IgM, Hep C Antibody, ANCA, Anti HBS Quant     Current eye related medications: Prednisone 60 mg daily,Diamox 500 mg once daily.         Retina/Uveitis Imaging:   RNFL Concepcion 15, 2022  right eye: Avg thickness 119 microns  Left eye: Avg thickness 124 microns    OCT Spectralis Macula June 22, 2022  right eye: Focal areas of improved outer retinal hyper reflectivity.  Irregular outer segment bands/PEDs with some interval atrophy.  No macular nor  peripapillary fluid  left eye: Some increased height of pigment epithelial detachment, stable area of atrophy, but no obvious fluid.  No macular nor peripapillary fluid.     Optos Fundus Autofluorescence June 22, 2022  right eye: Macular area of stable hyper autofluorescence, some interval increase in areas of hypoautofluorescence  left eye: Macular area of stable hyper autofluorescence, some interval increase in areas of hypoautofluorescence    Optos Fundus Photos OU (both eyes) June 22, 2022  right eye:Nearly confluent cream-colored spots in the macula with some interval increase in pigment  left eye: Nearly confluent cream-colored spots in the macula with some interval increase in pigment    Assessment:     1. Ampiginous choroiditis of both eyes  Interval increase in macular pigment, some interval atrophy, but NO CNVM    2. IIH (idiopathic intracranial hypertension)   Despite having elevated ICP noted on lumbar puncture, no disc edema, no pulsatile tinnitus    3. High risk medication use  Prednisone 60 mg daily, Oral Diamox 500 mg once daily.     Plan/Recommendations:      Discussed findings with patient and her Mother. She has been tolerating the high dose oral prednisone well. Subjectively, she feels there has been an improvement in the quality of her vision.    Exam shows increased more pigmentation within the placoid lesions. OCT and autofluorescence pattern have remained stable since last visit. It is our hope to stabilize the progression and see improvement in the lesions and her vision. We discussed the unknown prognosis at this time but will continue the current course. It is too early to determine if long term steroid sparing therapy will be needed, but we should be able to reassess at next visit.    Eye pressure is normal at 17/18, so no drops needed for eye pressure lowering    Recommend additional testing: None at this time.     Continue Prednisolone 60 mg daily    Continue Acetazolamide 500 mg daily  without reduction given need for ongoing prednisone    RTC Return for 2 weeks tonopen, dilate, OCT, RNFL, Photos (ordered).     Celso Frank MD  Vitreoretinal Surgery Fellow  [facility]     Attending Physician Attestation:  Complete documentation of historical and exam elements from today's encounter can be found in the full encounter summary report (not reduplicated in this progress note). I personally obtained the chief complaint(s) and history of present illness. I confirmed and edited as necessary the review of systems, past medical/surgical history, family history, social history, and examination findings as documented by others; and I examined the patient myself. I personally reviewed the relevant tests, images, and reports as documented above. I formulated and edited as necessary the assessment and plan and discussed the findings and management plan with the patient and family.  Sp Shipman MD.      Sincerely,    Sp Shipman MD  [facility] Dept of Ophthalmology  Uveitis and Medical Retina

## 2022-07-05 ENCOUNTER — OFFICE VISIT (OUTPATIENT)
Dept: OPHTHALMOLOGY | Facility: CLINIC | Age: 62
End: 2022-07-05
Attending: OPHTHALMOLOGY
Payer: COMMERCIAL

## 2022-07-05 DIAGNOSIS — H30.143 AMPIGINOUS CHOROIDITIS OF BOTH EYES: Primary | ICD-10-CM

## 2022-07-05 DIAGNOSIS — Z79.899 HIGH RISK MEDICATION USE: ICD-10-CM

## 2022-07-05 DIAGNOSIS — G93.2 IIH (IDIOPATHIC INTRACRANIAL HYPERTENSION): ICD-10-CM

## 2022-07-05 DIAGNOSIS — H35.89 RETINAL MACULAR ATROPHY: ICD-10-CM

## 2022-07-05 PROCEDURE — G0463 HOSPITAL OUTPT CLINIC VISIT: HCPCS | Mod: 25

## 2022-07-05 PROCEDURE — 92133 CPTRZD OPH DX IMG PST SGM ON: CPT | Performed by: OPHTHALMOLOGY

## 2022-07-05 PROCEDURE — 92134 CPTRZ OPH DX IMG PST SGM RTA: CPT | Performed by: OPHTHALMOLOGY

## 2022-07-05 PROCEDURE — 99207 FUNDUS PHOTOS OU (BOTH EYES): CPT | Mod: 26 | Performed by: OPHTHALMOLOGY

## 2022-07-05 PROCEDURE — 99214 OFFICE O/P EST MOD 30 MIN: CPT | Performed by: OPHTHALMOLOGY

## 2022-07-05 PROCEDURE — 92250 FUNDUS PHOTOGRAPHY W/I&R: CPT | Performed by: OPHTHALMOLOGY

## 2022-07-05 PROCEDURE — 99207 OCT OPTIC NERVE RNFL SPECTRALIS OU (BOTH EYES): CPT | Mod: 26 | Performed by: OPHTHALMOLOGY

## 2022-07-05 RX ORDER — AZATHIOPRINE 50 MG/1
TABLET ORAL
Qty: 60 TABLET | Refills: 1 | Status: SHIPPED | OUTPATIENT
Start: 2022-07-05 | End: 2022-07-27

## 2022-07-05 ASSESSMENT — REFRACTION_WEARINGRX
OS_CYLINDER: +1.50
OD_ADD: +2.50
OS_ADD: +2.50
OD_CYLINDER: +0.75
OS_AXIS: 020
SPECS_TYPE: PAL
OS_SPHERE: PLANO
OD_SPHERE: -1.00
OD_AXIS: 012

## 2022-07-05 ASSESSMENT — CONF VISUAL FIELD
OS_SUPERIOR_NASAL_RESTRICTION: 2
METHOD: COUNTING FINGERS
OD_SUPERIOR_NASAL_RESTRICTION: 2
OD_INFERIOR_NASAL_RESTRICTION: 2
OD_INFERIOR_TEMPORAL_RESTRICTION: 2
OD_SUPERIOR_TEMPORAL_RESTRICTION: 2

## 2022-07-05 ASSESSMENT — EXTERNAL EXAM - RIGHT EYE: OD_EXAM: NORMAL

## 2022-07-05 ASSESSMENT — VISUAL ACUITY
OD_CC: 20/250
OS_CC: 20/400
METHOD: SNELLEN - LINEAR
OS_PH_CC: 20/200
CORRECTION_TYPE: GLASSES

## 2022-07-05 ASSESSMENT — EXTERNAL EXAM - LEFT EYE: OS_EXAM: NORMAL

## 2022-07-05 ASSESSMENT — CUP TO DISC RATIO
OD_RATIO: 0.3
OS_RATIO: 0.3

## 2022-07-05 ASSESSMENT — SLIT LAMP EXAM - LIDS
COMMENTS: NORMAL
COMMENTS: NORMAL

## 2022-07-05 ASSESSMENT — TONOMETRY
IOP_METHOD: TONOPEN
OS_IOP_MMHG: 12
OD_IOP_MMHG: 12

## 2022-07-05 NOTE — LETTER
7/5/2022       RE: Parul Veliz  [address] [id number]     Dear Colleague,    Thank you for referring your patient, Parul Veliz, to the [facility] [place] at [facility]. Please see a copy of my visit note below.    Chief Complaint/Presenting Concern:  Uveitis follow up    Interval History of Present Ocular Illness:  Parul Veliz is a 62 year old patient who returns for follow up of her choroiditis of each eye. At las visit, we saw some interval improvement, so we elected to continue prednisone 60 mg daily.     Since then, Ms. Veliz reports the vision is about the same, although near vision/computer vision seems to be more challenging even with breaks. She is also having light sensitivity with computer and far vision.  Even with magnifying glass, the vision centrally is getting more challenging.     Interval Updates to Medical/Family/Social History:   Still having sleep issues, although stomach issues are better during the day.     Relevant Review of Systems Updates:  As above.    Laboratory Testing: None others     Current eye related medications: Diamox 500 mg daily, Prednisone 60 mg daily.     Retina/Uveitis Imaging:   OCT Spectralis Macula July 5, 2022  right eye: Focal PEDs, interval atrophy, no CNV, no fluid. No PP CNV  left eye: Focal PEDs, atrophy stable, no CNV, no fluid. No PP CNV        OCT Optic Nerve RNFL Spectralis July 5, 2022  right eye: Avg thickness 119 microns, no change  left eye: Avg thickness 117 microns, improved thickening    Optos Fundus Autofluorescence July 5, 2022  right eye: Stable mottled FAF in macula  left eye:  Stable mottled FAF in macula    Optos Fundus Photos OU (both eyes) July 5, 2022  right eye: Central hypopigmented spots  left eye:  Central hypopigmented spots    Assessment:     1. Ampiginous choroiditis of both eyes  Persistent vitreous haze, no new spots, no CNV    2. Retinal  macular atrophy  Subtle right eye and some persistent areas of atrophy left eye     3. IIH (idiopathic intracranial hypertension)  No increased disc edema right eye, improving edema left eye     4. High risk medication use  Prednisone 60 mg daily.     Plan/Recommendations:      Discussed findings with patient and her Father. There is persistent mild inflammation in the vitreous, but no new spots in the retina of either eye. We discussed that there may be some limitation based on retinal atrophy, but no signs of secondary CNV in the macula or around the optic nerve. As such, no injections are needed in either eye and we can start tapering prednisone plans.     We discussed difficult prognosis given limitations of retinal atrophy, limited central vision primarily affecting work. We will place a consult to Low Vision Optometry today for the best possible refraction (eyeglass prescription) to optimize reading and computer work.  We also placed a consultation to low-vision occupational therapy to help with day-to-day life skills    We further discussed that we are all interested in treatment to preserve intact vision and reduce the likelihood of worsening with reduction of oral prednisone dose during the future.  We agreed to start immunosuppressive therapy while on prednisone which should help us safely taper off prednisone.  We discussed that there may be a need for long-term use of this medication for potentially years, but that this is a very reasonable approach given the uncertain prognosis and significant visual reduction    Eye pressure is normal in each eye. The optic nerves are stable right eye and improving in the left eye. We discussed potentially tapering Diamox given we are reducing prednisone    Recommend additional testing: None at this time    Continue Diamox 500 mg but reduce to every other day    For the prednisone, let's start reducing the dose to 50 mg daily until next visit    Start a new medication  called Azathioprine. Take one pill 50 mg daily with food, then increase to twice daily.          RTC  1. Low Vision Consultation with Dr. Menjivar and Low Vision Consult with Luanne Altamirano    2. Y[place] 2-3 weeks tonopen, dilate, OCT, RNFL, OCT, Photos (ordered)     Physician Attestation     Attending Physician Attestation:  Complete documentation of historical and exam elements from today's encounter can be found in the full encounter summary report (not reduplicated in this progress note). I personally obtained the chief complaint(s) and history of present illness. I confirmed and edited as necessary the review of systems, past medical/surgical history, family history, social history, and examination findings as documented by others; and I examined the patient myself. I personally reviewed the relevant tests, images, and reports as documented above. I formulated and edited as necessary the assessment and plan and discussed the findings and management plan with the patient and family members present at the time of this visit.  Sp Shipman M.D., Uveitis and Medical Retina, July 5, 2022     Sincerely,    Sp Shipman MD  [facility] Dept of Ophthalmology  Uveitis and Medical Retina

## 2022-07-05 NOTE — NURSING NOTE
Chief Complaints and History of Present Illnesses   Patient presents with     Retinitis/choroiditis Follow Up     Chief Complaint(s) and History of Present Illness(es)     Retinitis/choroiditis Follow Up     Laterality: both eyes    Onset: gradual    Onset: months ago    Quality: Feels the va has decreased    Severity: moderate    Frequency: intermittently    Associated symptoms: photophobia (has increased).  Negative for flashes and floaters    Pain scale: 0/10              Comments     Here for Ampiginous choroiditis of both eyes   States the lights do not look normal and light sensitivity has increased over the past 4 days.  Prednisolone 60 mg daily  Diamox every day  Janina Webster COT 8:22 AM July 5, 2022

## 2022-07-05 NOTE — Clinical Note
Mika Stroud: I think we are able to schedule a visit with you. but I'll order a forma consult just in case. Hope all is well!

## 2022-07-05 NOTE — PROGRESS NOTES
Chief Complaint/Presenting Concern:  Uveitis follow up    Interval History of Present Ocular Illness:  Parul Veliz is a 62 year old patient who returns for follow up of her choroiditis of each eye. At las visit, we saw some interval improvement, so we elected to continue prednisone 60 mg daily.     Since then, Ms. Veliz reports the vision is about the same, although near vision/computer vision seems to be more challenging even with breaks. She is also having light sensitivity with computer and far vision.  Even with magnifying glass, the vision centrally is getting more challenging.     Interval Updates to Medical/Family/Social History:   Still having sleep issues, although stomach issues are better during the day.     Relevant Review of Systems Updates:  As above.    Laboratory Testing: None others     Current eye related medications: Diamox 500 mg daily, Prednisone 60 mg daily.     Retina/Uveitis Imaging:   OCT Spectralis Macula July 5, 2022  right eye: Focal PEDs, interval atrophy, no CNV, no fluid. No PP CNV  left eye: Focal PEDs, atrophy stable, no CNV, no fluid. No PP CNV    OCT Optic Nerve RNFL Spectralis July 5, 2022  right eye: Avg thickness 119 microns, no change  left eye: Avg thickness 117 microns, improved thickening    Optos Fundus Autofluorescence July 5, 2022  right eye: Stable mottled FAF in macula  left eye:  Stable mottled FAF in macula    Optos Fundus Photos OU (both eyes) July 5, 2022  right eye: Central hypopigmented spots  left eye:  Central hypopigmented spots    Assessment:     1. Ampiginous choroiditis of both eyes  Persistent vitreous haze, no new spots, no CNV    2. Retinal macular atrophy  Subtle right eye and some persistent areas of atrophy left eye     3. IIH (idiopathic intracranial hypertension)  No increased disc edema right eye, improving edema left eye     4. High risk medication use  Prednisone 60 mg daily.     Plan/Recommendations:      Discussed findings with patient and  her Father. There is persistent mild inflammation in the vitreous, but no new spots in the retina of either eye. We discussed that there may be some limitation based on retinal atrophy, but no signs of secondary CNV in the macula or around the optic nerve. As such, no injections are needed in either eye and we can start tapering prednisone plans.     We discussed difficult prognosis given limitations of retinal atrophy, limited central vision primarily affecting work. We will place a consult to Low Vision Optometry today for the best possible refraction (eyeglass prescription) to optimize reading and computer work.  We also placed a consultation to low-vision occupational therapy to help with day-to-day life skills    We further discussed that we are all interested in treatment to preserve intact vision and reduce the likelihood of worsening with reduction of oral prednisone dose during the future.  We agreed to start immunosuppressive therapy while on prednisone which should help us safely taper off prednisone.  We discussed that there may be a need for long-term use of this medication for potentially years, but that this is a very reasonable approach given the uncertain prognosis and significant visual reduction    Eye pressure is normal in each eye. The optic nerves are stable right eye and improving in the left eye. We discussed potentially tapering Diamox given we are reducing prednisone    Recommend additional testing: None at this time    Continue Diamox 500 mg but reduce to every other day    For the prednisone, let's start reducing the dose to 50 mg daily until next visit    Start a new medication called Azathioprine. Take one pill 50 mg daily with food, then increase to twice daily.    RTC  1. Low Vision Consultation with Dr. Menjivar and Low Vision Consult with Luanne Altamirano    2. Y[place]uha 2-3 weeks tonopen, dilate, OCT, RNFL, OCT, Photos (ordered)     Physician Attestation     Attending Physician Attestation:   Complete documentation of historical and exam elements from today's encounter can be found in the full encounter summary report (not reduplicated in this progress note). I personally obtained the chief complaint(s) and history of present illness. I confirmed and edited as necessary the review of systems, past medical/surgical history, family history, social history, and examination findings as documented by others; and I examined the patient myself. I personally reviewed the relevant tests, images, and reports as documented above. I formulated and edited as necessary the assessment and plan and discussed the findings and management plan with the patient and family members present at the time of this visit.  Sp Shipman M.D., Uveitis and Medical Retina, July 5, 2022

## 2022-07-05 NOTE — PATIENT INSTRUCTIONS
Recommend additional testing: None at this time  Continue Diamox 500 mg but reduce to every other day  For the prednisone, let's start reducing the dose to 50 mg daily.   Start a new medication called Azathioprine. Take one pill 50 mg daily with food, then increase to twice daily.

## 2022-07-27 ENCOUNTER — OFFICE VISIT (OUTPATIENT)
Dept: OPHTHALMOLOGY | Facility: CLINIC | Age: 62
End: 2022-07-27
Attending: OPHTHALMOLOGY
Payer: COMMERCIAL

## 2022-07-27 DIAGNOSIS — H30.143 AMPIGINOUS CHOROIDITIS OF BOTH EYES: Primary | ICD-10-CM

## 2022-07-27 DIAGNOSIS — H35.89 RETINAL MACULAR ATROPHY: ICD-10-CM

## 2022-07-27 DIAGNOSIS — Z79.899 HIGH RISK MEDICATION USE: ICD-10-CM

## 2022-07-27 DIAGNOSIS — G93.2 IIH (IDIOPATHIC INTRACRANIAL HYPERTENSION): ICD-10-CM

## 2022-07-27 PROCEDURE — 92134 CPTRZ OPH DX IMG PST SGM RTA: CPT | Performed by: OPHTHALMOLOGY

## 2022-07-27 PROCEDURE — 92250 FUNDUS PHOTOGRAPHY W/I&R: CPT | Performed by: OPHTHALMOLOGY

## 2022-07-27 PROCEDURE — 99214 OFFICE O/P EST MOD 30 MIN: CPT | Performed by: OPHTHALMOLOGY

## 2022-07-27 PROCEDURE — 99207 FUNDUS PHOTOS OU (BOTH EYES): CPT | Mod: 26 | Performed by: OPHTHALMOLOGY

## 2022-07-27 PROCEDURE — 92133 CPTRZD OPH DX IMG PST SGM ON: CPT | Performed by: OPHTHALMOLOGY

## 2022-07-27 PROCEDURE — G0463 HOSPITAL OUTPT CLINIC VISIT: HCPCS | Mod: 25

## 2022-07-27 RX ORDER — AZATHIOPRINE 50 MG/1
50 TABLET ORAL 2 TIMES DAILY
Qty: 180 TABLET | Refills: 1 | Status: SHIPPED | OUTPATIENT
Start: 2022-07-27 | End: 2022-08-31

## 2022-07-27 RX ORDER — PREDNISONE 20 MG/1
TABLET ORAL
Qty: 100 TABLET | Refills: 1 | Status: SHIPPED | OUTPATIENT
Start: 2022-07-27 | End: 2022-08-31

## 2022-07-27 ASSESSMENT — VISUAL ACUITY
OD_CC: 20/100
METHOD: SNELLEN - LINEAR
CORRECTION_TYPE: GLASSES
OS_CC: 20/150

## 2022-07-27 ASSESSMENT — REFRACTION_WEARINGRX
OS_AXIS: 020
OD_CYLINDER: +0.75
OS_ADD: +2.50
SPECS_TYPE: PAL
OD_AXIS: 012
OD_SPHERE: -1.00
OS_SPHERE: PLANO
OD_ADD: +2.50
OS_CYLINDER: +1.50

## 2022-07-27 ASSESSMENT — TONOMETRY
IOP_METHOD: TONOPEN
OD_IOP_MMHG: 18
OS_IOP_MMHG: 19

## 2022-07-27 ASSESSMENT — EXTERNAL EXAM - LEFT EYE: OS_EXAM: NORMAL

## 2022-07-27 ASSESSMENT — CUP TO DISC RATIO
OS_RATIO: 0.3
OD_RATIO: 0.3

## 2022-07-27 ASSESSMENT — CONF VISUAL FIELD
METHOD: COUNTING FINGERS
OD_NORMAL: 1
OS_NORMAL: 1

## 2022-07-27 ASSESSMENT — SLIT LAMP EXAM - LIDS
COMMENTS: NORMAL
COMMENTS: NORMAL

## 2022-07-27 ASSESSMENT — EXTERNAL EXAM - RIGHT EYE: OD_EXAM: NORMAL

## 2022-07-27 NOTE — LETTER
7/27/2022       RE: Parul Veliz  [address] [address]     Dear Colleague,    Thank you for referring your patient, Parul Veliz, to the [facility] [place] at [facility]. Please see a copy of my visit note below.    Chief Complaint/Presenting Concern:  Uveitis follow up    Interval History of Present Ocular Illness:  Parul Veliz is a 62 year old patient who returns for follow up of her idiopathic intracranial hypertension and ampiginous choroiditis. At last visit, we discussed starting Azathioprine and reducing oral prednisone.     Since then, Ms. Veliz reports she is managing with magnifying tools on the computer. There is some glare without necessarily worsening light sensitivity.     Interval Updates to Medical/Family/Social History:    Travelled to and from [place] safely.    Relevant Review of Systems Updates:  No nausea on Azathioprine. No particular sleep issues with the new medication.     Laboratory Testing: None since last visit     Current eye related medications: Azathioprine 50 mg twice daily, Prednisone 50 mg daily, Diamox 500 mg every other day    Retina/Uveitis Imaging:   OCT Spectralis Macula July 27, 2022  right eye: Improving PEDs, no fluid. Stable atrophy, no fluid. No NFL Thickening, no PP fluid  left eye: Improving PEDs, no fluid. Stable atrophy, no fluid. No NFL Thickening, no PP fluid    OCT Optic Nerve RNFL Spectralis July 27, 2022  right eye: Avg thickness 115 microns, stable without increased thickening.   left eye: Avg thickness 120 microns, generally stable    Optos Fundus Autofluorescence July 27, 2022  right eye: Improving hyper-AF in macula, stable hypo-FAF   left eye: Stable hyper FAF in macula, stable hypo-FAF dots    Optos Fundus Photos OU (both eyes) July 27, 2022  right eye: Light patches in macula stable  left eye: Pigment mottling in macula    Assessment:     1. Ampiginous  choroiditis of both eyes  There are improving pigment epithelial detachments and no choroidal neovascular membranes.    2. Retinal macular atrophy  Stable although improving vision    3. IIH (idiopathic intracranial hypertension)  No disc edema and optic nerve scans generally stable    4. High risk medication use  Azathioprine 50 mg twice daily, Prednisone 50 mg daily, Diamox 500 mg every other day    Plan/Recommendations:      Discussed findings with patient and her Father. There is interval improvement in vision, OCT changes, vitreous haze. There are stable areas of atrophy and no CNV. Overall, Azathioprine has been well tolerated so we can continue this medication and taper the prednisone gradually    Eye pressure is normal in each eye     Recommend additional testing: None at this time    Continue Azathioprine 50 mg twice daily    For the prednisone, continue 50 mg daily until Wed, 8/3/22. Then take 40 mg daily until 8/17/22, then take 30 mg daily until next visit    Regarding the idiopathic intracranial hypertension, we can stop the Acetazolamide (Diamox) as optic nerves unchanged in thickness. IF things change, we can always resume.    No new medications needed.    We will kindly request earlier Low Vision appointment if available before late September    Regarding the COVID booster, probably best to hold off for a few months    RTC  1. Dr. Menjivar Low Vision Refraction 8/25    2.Umberto later August-early Sept, tonopen, dilate, OCT, RNFL, Photos (Ordered)    3. Luanne Altamirano Low vision 9/21/22 or sooner if possible          Physician Attestation     Attending Physician Attestation:  Complete documentation of historical and exam elements from today's encounter can be found in the full encounter summary report (not reduplicated in this progress note). I personally obtained the chief complaint(s) and history of present illness. I confirmed and edited as necessary the review of systems, past medical/surgical  history, family history, social history, and examination findings as documented by others; and I examined the patient myself. I personally reviewed the relevant tests, images, and reports as documented above. I formulated and edited as necessary the assessment and plan and discussed the findings and management plan with the patient and family members present at the time of this visit.  Sp Shipman M.D., Uveitis and Medical Retina, July 27, 2022     Sincerely,    Sp Shipman MD  [facility] Dept of Ophthalmology  Uveitis and Medical Retina

## 2022-07-27 NOTE — PROGRESS NOTES
Chief Complaint/Presenting Concern:  Uveitis follow up    Interval History of Present Ocular Illness:  Parul Veliz is a 62 year old patient who returns for follow up of her idiopathic intracranial hypertension and ampiginous choroiditis. At last visit, we discussed starting Azathioprine and reducing oral prednisone.     Since then, Ms. Veliz reports she is managing with magnifying tools on the computer. There is some glare without necessarily worsening light sensitivity.     Interval Updates to Medical/Family/Social History:    Travelled to and from [place] safely.    Relevant Review of Systems Updates:  No nausea on Azathioprine. No particular sleep issues with the new medication.     Laboratory Testing: None since last visit     Current eye related medications: Azathioprine 50 mg twice daily, Prednisone 50 mg daily, Diamox 500 mg every other day    Retina/Uveitis Imaging:   OCT Spectralis Macula July 27, 2022  right eye: Improving PEDs, no fluid. Stable atrophy, no fluid. No NFL Thickening, no PP fluid  left eye: Improving PEDs, no fluid. Stable atrophy, no fluid. No NFL Thickening, no PP fluid    OCT Optic Nerve RNFL Spectralis July 27, 2022  right eye: Avg thickness 115 microns, stable without increased thickening.   left eye: Avg thickness 120 microns, generally stable    Optos Fundus Autofluorescence July 27, 2022  right eye: Improving hyper-AF in macula, stable hypo-FAF   left eye: Stable hyper FAF in macula, stable hypo-FAF dots    Optos Fundus Photos OU (both eyes) July 27, 2022  right eye: Light patches in macula stable  left eye: Pigment mottling in macula    Assessment:     1. Ampiginous choroiditis of both eyes  There are improving pigment epithelial detachments and no choroidal neovascular membranes.    2. Retinal macular atrophy  Stable although improving vision    3. IIH (idiopathic intracranial hypertension)  No disc edema and optic nerve scans generally stable    4. High risk medication  use  Azathioprine 50 mg twice daily, Prednisone 50 mg daily, Diamox 500 mg every other day    Plan/Recommendations:      Discussed findings with patient and her Father. There is interval improvement in vision, OCT changes, vitreous haze. There are stable areas of atrophy and no CNV. Overall, Azathioprine has been well tolerated so we can continue this medication and taper the prednisone gradually    Eye pressure is normal in each eye     Recommend additional testing: None at this time    Continue Azathioprine 50 mg twice daily    For the prednisone, continue 50 mg daily until Wed, 8/3/22. Then take 40 mg daily until 8/17/22, then take 30 mg daily until next visit    Regarding the idiopathic intracranial hypertension, we can stop the Acetazolamide (Diamox) as optic nerves unchanged in thickness. IF things change, we can always resume.    No new medications needed.    We will kindly request earlier Low Vision appointment if available before late September    Regarding the COVID booster, probably best to hold off for a few months    RTC  1. Dr. Menjivar Low Vision Refraction 8/25    2.Umberto later August-early Sept, tonopen, dilate, OCT, RNFL, Photos (Ordered)    3. Luanne Altamirano Low vision 9/21/22 or sooner if possible    Physician Attestation     Attending Physician Attestation:  Complete documentation of historical and exam elements from today's encounter can be found in the full encounter summary report (not reduplicated in this progress note). I personally obtained the chief complaint(s) and history of present illness. I confirmed and edited as necessary the review of systems, past medical/surgical history, family history, social history, and examination findings as documented by others; and I examined the patient myself. I personally reviewed the relevant tests, images, and reports as documented above. I formulated and edited as necessary the assessment and plan and discussed the findings and management plan with  the patient and family members present at the time of this visit.  Sp Shipman M.D., Uveitis and Medical Retina, July 27, 2022

## 2022-07-27 NOTE — NURSING NOTE
Chief Complaints and History of Present Illnesses   Patient presents with     Retinitis/choroiditis Follow Up     Chief Complaint(s) and History of Present Illness(es)     Retinitis/choroiditis Follow Up     Laterality: both eyes    Onset: gradual    Onset: months ago    Quality: Feels the dist has improved     Severity: moderate    Frequency: intermittently    Associated symptoms: glare (has increased), photophobia and flashes (more at night).  Negative for floaters    Treatments tried: no treatments    Pain scale: 0/10              Comments     Here for Ampiginous choroiditis of both eyes   Diamox every other day  Prednisone 50 mg  Azathioprine  Janina Webster COT 8:19 AM July 27, 2022

## 2022-07-27 NOTE — PATIENT INSTRUCTIONS
Continue Azathioprine 50 mg twice daily  For the prednisone, continue 50 mg daily until Wed, 8/3/22. Then take 40 mg daily until 8/17/22, then take 30 mg daily until next visit  Regarding the idiopathic intracranial hypertension, we can stop the Acetazolamide (Diamox) as optic nerves unchanged in thickness. IF things change, we can always resume.  No new medications needed.  We will kindly request earlier Low Vision appointment if available before late September  Regarding the COVID booster, probably best to hold off for a few months

## 2022-08-08 ENCOUNTER — HOSPITAL ENCOUNTER (OUTPATIENT)
Dept: OCCUPATIONAL THERAPY | Facility: CLINIC | Age: 62
Setting detail: THERAPIES SERIES
Discharge: HOME | End: 2022-08-08
Attending: OPHTHALMOLOGY
Payer: COMMERCIAL

## 2022-08-08 PROCEDURE — 97535 SELF CARE MNGMENT TRAINING: CPT | Mod: GO | Performed by: OCCUPATIONAL THERAPIST

## 2022-08-08 PROCEDURE — 97165 OT EVAL LOW COMPLEX 30 MIN: CPT | Mod: GO | Performed by: OCCUPATIONAL THERAPIST

## 2022-08-08 ASSESSMENT — ACTIVITIES OF DAILY LIVING (ADL): PRIOR_ADL/IADL_STATUS: INDEPENDENT PRIOR TO ONSET

## 2022-08-08 ASSESSMENT — VISUAL ACUITY
OD: 20/100
OS: 20/150

## 2022-08-08 NOTE — PROGRESS NOTES
08/08/22 1200   Visit Type   Type of Visit Initial   General Information   Start Of Care Date 08/08/22   Referring Physician Sp Shipman MD   Orders Evaluate And Treat As Indicated   Orders Comment Low Vision   Date of Order 07/05/22   Medical Diagnosis Retinal macular atrophy (H35.89)   Onset Of Illness/injury Or Date Of Surgery 7/5/22 - date of order   Surgical/Medical history reviewed Yes   Precautions/Limitations falls risk   Additional Occupational Profile Info/Pertinent History of Current Problem idiopathic intracranial hypertension and ampiginous choroiditis. There is some glare without necessarily worsening light sensitivity. Ampiginous choroiditis of both eyes: There are improving pigment epithelial detachments and no choroidal neovascular membranes. Retinal macular atrophy: Stable although improving vision. IIH (idiopathic intracranial hypertension): No disc edema and optic nerve scans generally stable. Past ocular history: wears glasses and has had a PVD in her left eye 1.5 years ago but did not have any complications requiring laser. She was born 8 weeks prematurely but never had any eye exam when she was a baby. PMH:  history of cervical cancer s/p radical hysterectomy and breast cancer s/p double mastectomy in 2014. She says she is cancer free at this time and is done with her monitoring.   Prior ADL/IADL status Independent prior to onset   Others present at visit Parent(s)   Patient/family Goals Statement To help with work tasks, be able to see better to cook, do laundry, etc.; be able to read and see her phone better, etc.   General information comments father Marco, present for session   Social History/Home Environment   Living Environment [place]/[facility]  (rambler with dad - patient on main level)   Current Community Support Family/friend caregiver  (dad)   Patient Role/employment History  Employed  ([employer])   Avocational was off work for 30 days - now 8 hours max/day (was  "working up to 12-15 hours/day pre-dx); working from home for now: larger monitors (2 computer screens); office in [place]; job duties: 100% on computer - taking breaks every 60 minutes; leisure: boating - has dock space, pontoon, watching TV, read - all are a challenge now with decreased vision   Social/Environment Comment hasn't been able to drive since dx   Pain Assessment   Pain Reported No   Fall Risk Screen   Have you fallen 2 or more times in the past year? No   Have you fallen and had an injury in the past year? No   Is patient a fall risk? Yes  (vision)   Abuse Screen (yes response referral indicated)   Feels Unsafe at Home or Work/School   (didn't address - family present)   Physical Signs of Abuse Present no   Cognitive/Behavioral   Communication Intact   Cognitive Status Intact for evaluation process   Behavior Appropriate   Physical Status/Equipment   Physical Status/Equipment comments \"2 bad knees, bad back\" - not affecting vision   Visual Report   Functional Complaints Reading;Writing;Work related tasks;Homemaking;Safety in mobility   Visual Complaints Light sensitivity;Difficulty maintaining focus;Scotoma Hindrance right eye;Scotoma Hindrance left eye;Visual fatigue   Complaints comments floaters - had before dx   [place] Symptoms? No   Magnifier (strength and type) new since dx: purchased 3x rectangular, 5x rectangular, 10x, 15x, 30x magnifiers on [employer] (though unfortunately don't look to be these higher magnification); has yellow filters   Reading glasses Bifocal  (no line bifocal likely; separate pair of computer lenses)   Technology Computer   Equipment comments android cell phone; computer: has enlarged things (control plus) but limited and using magnifiers for Excel)   Lighting and Glare   Is your lighting adequate? Yes/ at home   Is glare a problem? Yes/ indoors;Yes/ outdoors   Are you satisfied with your sunglasses?   (not sure - has Solar Shield - medium gray fitovers and they " seem okay; doesn't like karan)   Sunglass filter color Gray   Visual Acuity   Acuity right eye 20/100   Acuity left eye 20/150   Acuity both eyes together above per recent eye MD visit   Contrast Sensitivity   Contrast sensitivity (score/25) 17/25 - moving head around   Preferred Retinal Locus   Right eye   (clock face: missing upper middle)   Right eye eccentric viewing position Superior  (although #1 less visable)   Left eye   (clock face: missing upper middle)   Left eye eccentric viewing position Superior   MN Read   Smallest print size read 1.6M (task light - 4000K preferred only improved to a few words on 1.3M size)   Critical print size 2.5M   Words per minute at critical print size 107wpm; preferred print size: 2.5M   Clinical Impression, OT Eval   Criteria for Skilled Therapeutic Interventions Met yes;treatment indicated   Therapy  Diagnosis: Impaired ADL/IADL with deficits in Reading based ADL;Written communication;Home management;Financial management;Dressing;Grooming;Eating;Work performance  (safety with mobility)   Impairment comments decreased ADL/IADL independence   Assessment of Occupational Performance 5 or more Performance Deficits   Identified Performance Deficits Reading based ADL;Written communication;Home management;Financial management;Dressing;Grooming;Eating;Work performance;safety with mobility   Clinical Decision Making (Complexity) Low complexity   OT Visual Rehabilitation Evaluation Plan   Therapy Plan Occupational therapy intervention   Planned Interventions Scotoma awareness;Visual field loss awareness;Visual skills training for near tasks;Visual skills training for safety in mobility;Low vision compensatory training for reading;Low vision compensatory training for written communication;Low vision compensatory trainging for financial management;Low vision compensatory training for object identification;Instruction in environmental adaptations for glare;Instruction in environmental  adaptations for contrast;Instruction in environmental adaptations for lighting;Optical device/ADL device instruction and training;Computer modifications;Instruction in community resources   Frequency / Duration 1x/week, visit as indicated x5 months (extended date due to potential scheduling conflicts)   Risks and Benefits of Treatment have been explained. Yes   Patient, Family in agreement with plan of care Yes   GOALS   Goals Near Vision;Environmental Modification;Resource Education;Distance Viewing   Goal 1 - Near Vision   Near Vision Goal Comment Patient will demonstrate 3 pieces of adaptive equipment and/or adaptive techniques in regards to magnification, lighting, contrast and glare for increased ADL/IADL independence with near vision tasks (reading, meal prep, writing).   Target Date 01/08/23   Goal 3 - Environmental modification   Environmental Modification Goal Comment Patient will demonstrate and/or verbalize 3 environmentally-based ADL modifications to improve visual-based ADL/IADL activities.   Target Date 01/08/23   Goal 4 - Resource education   Goal Description: Resource education Patient will verbalize awareness of community resources for the following:;Access to large print materials;Audio access to print materials;Access to low vision devices;Transportation alternatives;Support in vocational goals   Target Date 01/08/23   Goal 5 - Distance viewing   Distance Viewing Goal Comment Pt will demonstrate increased independence in distance viewing for safety and leisure during ADL/IADLs through independent use of at least one adaptive technique or AE.   Target Date 01/08/23   Total Evaluation Time   OT Eval, Low Complexity Minutes ([id number]) 36

## 2022-08-31 ENCOUNTER — OFFICE VISIT (OUTPATIENT)
Dept: OPHTHALMOLOGY | Facility: CLINIC | Age: 62
End: 2022-08-31
Attending: OPHTHALMOLOGY
Payer: COMMERCIAL

## 2022-08-31 DIAGNOSIS — R76.8 ANA POSITIVE: ICD-10-CM

## 2022-08-31 DIAGNOSIS — H30.143 AMPIGINOUS CHOROIDITIS OF BOTH EYES: Primary | ICD-10-CM

## 2022-08-31 DIAGNOSIS — Z79.899 HIGH RISK MEDICATION USE: ICD-10-CM

## 2022-08-31 DIAGNOSIS — H35.89 RETINAL MACULAR ATROPHY: ICD-10-CM

## 2022-08-31 DIAGNOSIS — G93.2 IIH (IDIOPATHIC INTRACRANIAL HYPERTENSION): ICD-10-CM

## 2022-08-31 PROCEDURE — 99214 OFFICE O/P EST MOD 30 MIN: CPT | Mod: GC | Performed by: STUDENT IN AN ORGANIZED HEALTH CARE EDUCATION/TRAINING PROGRAM

## 2022-08-31 PROCEDURE — 99207 PR BUNDLED PROCEDURE IN GLOBAL PKG: CPT | Mod: 26 | Performed by: STUDENT IN AN ORGANIZED HEALTH CARE EDUCATION/TRAINING PROGRAM

## 2022-08-31 PROCEDURE — G0463 HOSPITAL OUTPT CLINIC VISIT: HCPCS | Mod: 25

## 2022-08-31 PROCEDURE — 92250 FUNDUS PHOTOGRAPHY W/I&R: CPT | Performed by: OPHTHALMOLOGY

## 2022-08-31 PROCEDURE — 92133 CPTRZD OPH DX IMG PST SGM ON: CPT | Performed by: OPHTHALMOLOGY

## 2022-08-31 PROCEDURE — 92134 CPTRZ OPH DX IMG PST SGM RTA: CPT | Mod: 26 | Performed by: STUDENT IN AN ORGANIZED HEALTH CARE EDUCATION/TRAINING PROGRAM

## 2022-08-31 PROCEDURE — 92134 CPTRZ OPH DX IMG PST SGM RTA: CPT | Performed by: OPHTHALMOLOGY

## 2022-08-31 RX ORDER — PREDNISONE 10 MG/1
TABLET ORAL
Qty: 100 TABLET | Refills: 1 | Status: SHIPPED | OUTPATIENT
Start: 2022-08-31 | End: 2022-12-13

## 2022-08-31 RX ORDER — AZATHIOPRINE 50 MG/1
50 TABLET ORAL 2 TIMES DAILY
Qty: 180 TABLET | Refills: 1 | Status: SHIPPED | OUTPATIENT
Start: 2022-08-31 | End: 2022-12-13

## 2022-08-31 RX ORDER — ASPIRIN 325 MG
TABLET ORAL
COMMUNITY
Start: 2022-08-26 | End: 2022-10-19

## 2022-08-31 RX ORDER — ATORVASTATIN CALCIUM 40 MG/1
TABLET, FILM COATED ORAL
COMMUNITY
Start: 2022-08-26

## 2022-08-31 ASSESSMENT — CONF VISUAL FIELD
OS_NORMAL: 1
OD_NORMAL: 1
METHOD: COUNTING FINGERS

## 2022-08-31 ASSESSMENT — CUP TO DISC RATIO
OD_RATIO: 0.3
OS_RATIO: 0.3

## 2022-08-31 ASSESSMENT — SLIT LAMP EXAM - LIDS
COMMENTS: NORMAL
COMMENTS: NORMAL

## 2022-08-31 ASSESSMENT — REFRACTION_WEARINGRX
OS_CYLINDER: +1.50
OS_AXIS: 020
SPECS_TYPE: PAL
OS_ADD: +2.50
OD_AXIS: 012
OS_SPHERE: PLANO
OD_SPHERE: -1.00
OD_ADD: +2.50
OD_CYLINDER: +0.75

## 2022-08-31 ASSESSMENT — TONOMETRY
OS_IOP_MMHG: 19
IOP_METHOD: TONOPEN
OD_IOP_MMHG: 15

## 2022-08-31 ASSESSMENT — VISUAL ACUITY
OD_CC: 20/50
CORRECTION_TYPE: GLASSES
OS_CC: 20/80
METHOD: SNELLEN - LINEAR

## 2022-08-31 ASSESSMENT — EXTERNAL EXAM - LEFT EYE: OS_EXAM: NORMAL

## 2022-08-31 ASSESSMENT — EXTERNAL EXAM - RIGHT EYE: OD_EXAM: NORMAL

## 2022-08-31 NOTE — LETTER
8/31/2022       RE: Parul Veliz  [address] [facility] [id number]     Dear Colleague,    Thank you for referring your patient, Parul Veliz, to the [facility] at [facility]. Please see a copy of my visit note below.    Chief Complaint/Presenting Concern: Uveitis follow-up    Interval History of Present Ocular Illness:  Parul Veliz is a 62 year old patient who returns for follow up of her ampiginous choroiditis and idiopathic intracranial hypertension.  We last saw each other on July 27, 2022 at which time there was interval improvement inflammation vision and OCT changes.  There was no evidence of secondary choroidal neovascularization and no increase in optic disc edema on less frequent Diamox.  We elected to stop the Diamox, continue azathioprine, and taper prednisone.    Since then, she has noticed more pronounced light sensitivity in both eyes. However, she reports her vision is improving in both eyes.  She is perhaps more light sensitive, white light seems more bothersome.    Parul saw Luanne Altamirano in Low Vision who made some initial recommendations and will have follow up soon. She got stronger readers and finds that she is able to help find clear spots to find areas where things are clearer. Will see Dr. Menjivar for low vision refraction soon. This got delayed due to hospital stay.    Interval Updates to Medical/Family/Social History:    Ms. Veliz was admitted to the hospital on August 25, 2022 for transient ischemic attack.  She was evaluated by neurology who did not feel she had a stroke.  Additional medications including clopidogrel high dose aspirin, atorvastatin were added to the medication regimen.     Relevant Review of Systems Updates:  There is some imbalance when walking sometimes related to being tired. Parul reports she is tired in the morning but things get better as the day goes on. Sleep has  been more interrupted recently.       Laboratory Testing: Reviewed from August 25, 2022: Negative: ANCA, antidouble-stranded DNA.  QI positive 1:160 dilution.  CBC within normal limits    Reviewed from August 15, 2022: CMP within normal limits other than elevated glucose at 137     Current eye related medications:  Azathioprine 50 mg twice daily, Prednisone 30 mg daily (last two weeks) , Aspirin 325 mg daily, no Diamox     Retina/Uveitis Imaging:   OCT Spectralis Macula August 31, 2022  right eye: Few PEDs, some increased atrophy superior to fovea, one small cyst over atrophy inferior, no NFL Thickening nor PP fluid. Normal choroidal thickness  left eye: Stable PEDs, no fluid. Stable atrophy, no fluid. Normal choroidal thickness. No NFL Thickening nor PP fluid    OCT Optic Nerve RNFL Spectralis August 31, 2022  right eye: Avg thickness 117 microns, no thinning, minimal change  left eye: Avg thickness 117 microns, no thinning, minimal change    Optos Fundus Autofluorescence August 31, 2022  right eye: Improving hyper-AF in macula, slight increase in hypo-FAF   left eye: Improving hyper FAF in macula, slight increase hypo-FAF dots    Optos Fundus Photos OU (both eyes) August 31, 2022  right eye: Light patches in macula stable  left eye: Pigment mottling in macula-stable    Assessment:     1. Ampiginous choroiditis of both eyes  No increase in vitreous inflammation no CNV.    2. Retinal macular atrophy  Slight increase in outer retinal atrophy right eye, stable left eye     3. IIH (idiopathic intracranial hypertension)  No recurrence off Diamox for the last month    4. QI positive  1:160 identified on recent hospital stay without clear systemic association.    5. High risk medication use  Azathioprine 50 mg twice daily, Prednisone 30 mg daily (last two weeks) , no Diamox     Plan/Recommendations:      Discussed findings with patient and her Father. Now on continued Azathioprine 50 mg twice daily and less prednisone,  there is no increase in inflammation and no new outer retinal spots on OCT. There is slight increase in atrophy by OCT in the right eye, but stable changes left eye. Fortunately, there are no macular nor peripapillary CNV in either eye. We will continue Azathioprine and prednisone taper    There is no recurrence of optic nerve edema now off Diamox for the last month. We can continue to monitor off Diamox     Regarding the improved visual acuity, it may be that this is due to less inflammation of the outer retinal layers. We will check the retinal and choroidal vessels again next time with dye-less angiography    Eye pressure is normal in each eye, so we can monitor without drops    Recommend additional testing: None at this time. Given recent QI positivity reasonable to proceed with Rheumatology consultation of recommended after recent hospital stay.    For the prednisone, Continue equivalent of 30 mg each AM until 9/4/22, then reduce to 20 mg each AM until 9/19/22, then take 10 mg each AM until next visit. If you notice the vision gets worse or if light sensitivity increases, let me know    Continue Azathioprine 50 mg twice a day    No diamox pills needed.    Regarding the vision, it might be reasonable to consider day driving based on Dr. Menjivar's assessment. We will defer to him    Regarding the flu shot, it might be best to wait until we can get to a dose of 10 mg of prednisone daily. This way, your body is more likely to do well rather than you getting sick after the vaccine. Tentatively, in October    Seen with Dr Akil Rodriguez, Retina Fellow    RTC Return for 1st week of October , Tonopen, Dilate, OCT, RNFL, Optovue OCT-A, Optos Photos (ordered).     Attending Physician Attestation:  Complete documentation of historical and exam elements from today's encounter can be found in the full encounter summary report (not reduplicated in this progress note). I personally obtained the chief complaint(s) and history  of present illness. I confirmed and edited as necessary the review of systems, past medical/surgical history, family history, social history, and examination findings as documented by others; and I examined the patient myself. I personally reviewed the relevant tests, images, and reports as documented above. I formulated and edited as necessary the assessment and plan and discussed the findings and management plan with the patient and family.  Sp Shipman MD.      Sincerely,    Sp Shipman MD  [facility] Dept of Ophthalmology  Uveitis and Medical Retina

## 2022-08-31 NOTE — PATIENT INSTRUCTIONS
For the prednisone, Continue equivalent of 30 mg each AM until 9/4/22, then reduce to 20 mg each AM until 9/19/22, then take 10 mg each AM until next visit.   Continue Azathioprine 50 mg twice a day  No diamox pills  If you notice the vision gets worse or if light sensitivity increases, let me know  Regarding the vision, it might be reasonable to consider day driving based on Dr. Menjivar's assessment.   Regarding the flu shot, it might be best to wait until we can get to a dose of 10 mg of prednisone daily. This way, your body is more likely to do well rather than you getting sick after the vaccine. Tentatively, in October

## 2022-08-31 NOTE — NURSING NOTE
Chief Complaints and History of Present Illnesses   Patient presents with     Retinitis/choroiditis Follow Up     Chief Complaint(s) and History of Present Illness(es)     Retinitis/choroiditis Follow Up     Laterality: both eyes    Onset: gradual    Onset: months ago    Quality: States the va is better    Severity: moderate    Frequency: intermittently    Associated symptoms: photophobia (has increased).  Negative for flashes and floaters    Pain scale: 0/10              Comments     Here for Ampiginous choroiditis of both eyes   Did have a TIA on 8/25  Azathioprine   Prednisone 30 mg  Janina Webster COT 8:26 AM August 31, 2022

## 2022-08-31 NOTE — PROGRESS NOTES
Chief Complaint/Presenting Concern: Uveitis follow-up    Interval History of Present Ocular Illness:  Parul Veliz is a 62 year old patient who returns for follow up of her ampiginous choroiditis and idiopathic intracranial hypertension.  We last saw each other on July 27, 2022 at which time there was interval improvement inflammation vision and OCT changes.  There was no evidence of secondary choroidal neovascularization and no increase in optic disc edema on less frequent Diamox.  We elected to stop the Diamox, continue azathioprine, and taper prednisone.    Since then, she has noticed more pronounced light sensitivity in both eyes. However, she reports her vision is improving in both eyes.  She is perhaps more light sensitive, white light seems more bothersome.     Parul saw Luanne Altamirano in Low Vision who made some initial recommendations and will have follow up soon. She got stronger readers and finds that she is able to help find clear spots to find areas where things are clearer. Will see Dr. Menjivar for low vision refraction soon. This got delayed due to hospital stay.    Interval Updates to Medical/Family/Social History:    Ms. Veliz was admitted to the hospital on August 25, 2022 for transient ischemic attack.  She was evaluated by neurology who did not feel she had a stroke.  Additional medications including clopidogrel high dose aspirin, atorvastatin were added to the medication regimen.     Relevant Review of Systems Updates:  There is some imbalance when walking sometimes related to being tired. Parul reports she is tired in the morning but things get better as the day goes on. Sleep has been more interrupted recently.     Laboratory Testing: Reviewed from August 25, 2022: Negative: ANCA, antidouble-stranded DNA.  QI positive 1:160 dilution.  CBC within normal limits    Reviewed from August 15, 2022: CMP within normal limits other than elevated glucose at 137     Current eye related medications:   Azathioprine 50 mg twice daily, Prednisone 30 mg daily (last two weeks) , Aspirin 325 mg daily, no Diamox     Retina/Uveitis Imaging:   OCT Spectralis Macula August 31, 2022  right eye: Few PEDs, some increased atrophy superior to fovea, one small cyst over atrophy inferior, no NFL Thickening nor PP fluid. Normal choroidal thickness  left eye: Stable PEDs, no fluid. Stable atrophy, no fluid. Normal choroidal thickness. No NFL Thickening nor PP fluid    OCT Optic Nerve RNFL Spectralis August 31, 2022  right eye: Avg thickness 117 microns, no thinning, minimal change  left eye: Avg thickness 117 microns, no thinning, minimal change    Optos Fundus Autofluorescence August 31, 2022  right eye: Improving hyper-AF in macula, slight increase in hypo-FAF   left eye: Improving hyper FAF in macula, slight increase hypo-FAF dots    Optos Fundus Photos OU (both eyes) August 31, 2022  right eye: Light patches in macula stable  left eye: Pigment mottling in macula-stable    Assessment:     1. Ampiginous choroiditis of both eyes  No increase in vitreous inflammation no CNV.    2. Retinal macular atrophy  Slight increase in outer retinal atrophy right eye, stable left eye     3. IIH (idiopathic intracranial hypertension)  No recurrence off Diamox for the last month    4. QI positive  1:160 identified on recent hospital stay without clear systemic association.    5. High risk medication use  Azathioprine 50 mg twice daily, Prednisone 30 mg daily (last two weeks) , no Diamox     Plan/Recommendations:      Discussed findings with patient and her Father. Now on continued Azathioprine 50 mg twice daily and less prednisone, there is no increase in inflammation and no new outer retinal spots on OCT. There is slight increase in atrophy by OCT in the right eye, but stable changes left eye. Fortunately, there are no macular nor peripapillary CNV in either eye. We will continue Azathioprine and prednisone taper    There is no recurrence of  optic nerve edema now off Diamox for the last month. We can continue to monitor off Diamox     Regarding the improved visual acuity, it may be that this is due to less inflammation of the outer retinal layers. We will check the retinal and choroidal vessels again next time with dye-less angiography    Eye pressure is normal in each eye, so we can monitor without drops    Recommend additional testing: None at this time. Given recent QI positivity reasonable to proceed with Rheumatology consultation of recommended after recent hospital stay.    For the prednisone, Continue equivalent of 30 mg each AM until 9/4/22, then reduce to 20 mg each AM until 9/19/22, then take 10 mg each AM until next visit. If you notice the vision gets worse or if light sensitivity increases, let me know    Continue Azathioprine 50 mg twice a day    No diamox pills needed.    Regarding the vision, it might be reasonable to consider day driving based on Dr. Menjivar's assessment. We will defer to him    Regarding the flu shot, it might be best to wait until we can get to a dose of 10 mg of prednisone daily. This way, your body is more likely to do well rather than you getting sick after the vaccine. Tentatively, in October    Seen with Dr Akil Rodriguez, Retina Fellow    RTC Return for 1st week of October , Tonopen, Dilate, OCT, RNFL, Optovue OCT-A, Optos Photos (ordered).     Attending Physician Attestation:  Complete documentation of historical and exam elements from today's encounter can be found in the full encounter summary report (not reduplicated in this progress note). I reviewed the chief complaint(s) and history of present illness, and  confirmed and edited as necessary the review of systems, past medical/surgical history, family history, social history, and examination findings as documented by others and the treating Resident or Fellow Physician.    I examined the patient myself, discussed the findings, reviewed all ancillary testing  data and modified these results and reports along with the assessment and plan with the Treating Resident or Fellow Physician. I agree with the note as detailed above.   Sp Shipman M.D.  Uveitis and Medical Retina  August 31, 2022

## 2022-09-08 ENCOUNTER — OFFICE VISIT (OUTPATIENT)
Dept: OPTOMETRY | Facility: CLINIC | Age: 62
End: 2022-09-08
Payer: COMMERCIAL

## 2022-09-08 DIAGNOSIS — H52.4 PRESBYOPIA: ICD-10-CM

## 2022-09-08 DIAGNOSIS — H30.143 AMPIGINOUS CHOROIDITIS OF BOTH EYES: Primary | ICD-10-CM

## 2022-09-08 ASSESSMENT — VISUAL ACUITY
OS_CC+: -2
METHOD: SNELLEN - LINEAR
OD_CC: 20/50
OS_CC: 20/60
OD_CC+: -2

## 2022-09-08 ASSESSMENT — CONF VISUAL FIELD
OS_NORMAL: 1
OD_NORMAL: 1

## 2022-09-08 ASSESSMENT — REFRACTION_MANIFEST
OS_SPHERE: PLANO
OD_ADD: +3.00
OD_CYLINDER: +0.50
OD_AXIS: 060
OS_ADD: +3.00
OD_SPHERE: -1.00
OS_AXIS: 180
OS_CYLINDER: +0.50

## 2022-09-08 NOTE — PROGRESS NOTES
Assessment/Plan  (H30.143) Ampiginous choroiditis of both eyes  (primary encounter diagnosis)  Comment: Patient follows with retina clinic.   Plan: Continue care with Dr. Shpiman. Patient demonstrated a good understanding that ocular health rather than refractive error remains the most important factor limiting her vision. Some improvement in acuity was noted today with an update, but contrast will also play an important role in determining her vision quality. Recommended patient avoid driving until she is comfortable as a passenger with her vision, and then would recommend avoiding nighttime driving and inclement weather.     (H52.4) Presbyopia  Plan: REFRACTION [[id number]]        Discussed findings with patient. New spectacle prescription dispensed to patient. Patient is welcome to return to clinic with prolonged adaptation difficulties. Return to clinic with changes to vision for recheck if needed.           45 minutes were spent on the date of the encounter doing chart review, history and exam, documentation, and further activities as noted above.    Complete documentation of historical and exam elements from today's encounter can  be found in the full encounter summary report (not reduplicated in this progress  note). I personally obtained the chief complaint(s) and history of present illness. I  confirmed and edited as necessary the review of systems, past medical/surgical  history, family history, social history, and examination findings as documented by  others; and I examined the patient myself. I personally reviewed the relevant tests,  images, and reports as documented above. I formulated and edited as necessary the  assessment and plan and discussed the findings and management plan with the  patient and family.    Jeffrey Menjivar, BILL

## 2022-09-09 ASSESSMENT — SLIT LAMP EXAM - LIDS
COMMENTS: NORMAL
COMMENTS: NORMAL

## 2022-09-09 ASSESSMENT — CUP TO DISC RATIO
OD_RATIO: 0.3
OS_RATIO: 0.3

## 2022-09-09 ASSESSMENT — EXTERNAL EXAM - RIGHT EYE: OD_EXAM: NORMAL

## 2022-09-09 ASSESSMENT — EXTERNAL EXAM - LEFT EYE: OS_EXAM: NORMAL

## 2022-10-17 ENCOUNTER — HOSPITAL ENCOUNTER (OUTPATIENT)
Dept: OCCUPATIONAL THERAPY | Facility: CLINIC | Age: 62
Setting detail: THERAPIES SERIES
Discharge: HOME | End: 2022-10-17
Attending: OPHTHALMOLOGY
Payer: COMMERCIAL

## 2022-10-17 PROCEDURE — 97535 SELF CARE MNGMENT TRAINING: CPT | Mod: GO | Performed by: OCCUPATIONAL THERAPIST

## 2022-10-19 ENCOUNTER — OFFICE VISIT (OUTPATIENT)
Dept: OPHTHALMOLOGY | Facility: CLINIC | Age: 62
End: 2022-10-19
Attending: OPHTHALMOLOGY
Payer: COMMERCIAL

## 2022-10-19 DIAGNOSIS — H30.143 AMPIGINOUS CHOROIDITIS OF BOTH EYES: Primary | ICD-10-CM

## 2022-10-19 DIAGNOSIS — H35.89 RETINAL MACULAR ATROPHY: ICD-10-CM

## 2022-10-19 DIAGNOSIS — Z79.899 HIGH RISK MEDICATION USE: ICD-10-CM

## 2022-10-19 DIAGNOSIS — G93.2 IIH (IDIOPATHIC INTRACRANIAL HYPERTENSION): ICD-10-CM

## 2022-10-19 PROCEDURE — 99207 FUNDUS PHOTOS OU (BOTH EYES): CPT | Mod: 26 | Performed by: OPHTHALMOLOGY

## 2022-10-19 PROCEDURE — 92133 CPTRZD OPH DX IMG PST SGM ON: CPT | Performed by: OPHTHALMOLOGY

## 2022-10-19 PROCEDURE — 92250 FUNDUS PHOTOGRAPHY W/I&R: CPT | Performed by: OPHTHALMOLOGY

## 2022-10-19 PROCEDURE — 92134 CPTRZ OPH DX IMG PST SGM RTA: CPT | Performed by: OPHTHALMOLOGY

## 2022-10-19 PROCEDURE — 99214 OFFICE O/P EST MOD 30 MIN: CPT | Mod: GC | Performed by: OPHTHALMOLOGY

## 2022-10-19 PROCEDURE — G0463 HOSPITAL OUTPT CLINIC VISIT: HCPCS | Mod: 25

## 2022-10-19 PROCEDURE — 99207 OCT OPTIC NERVE RNFL SPECTRALIS OU (BOTH EYES): CPT | Mod: 26 | Performed by: OPHTHALMOLOGY

## 2022-10-19 RX ORDER — CLOPIDOGREL BISULFATE 75 MG/1
TABLET ORAL
COMMUNITY
Start: 2022-09-29

## 2022-10-19 ASSESSMENT — REFRACTION_WEARINGRX
OD_SPHERE: -1.00
OD_ADD: +2.50
OS_AXIS: 020
OD_CYLINDER: +0.75
OS_SPHERE: PLANO
OS_ADD: +2.50
OS_CYLINDER: +1.50
OD_AXIS: 012
SPECS_TYPE: PAL

## 2022-10-19 ASSESSMENT — VISUAL ACUITY
METHOD: SNELLEN - LINEAR
OS_CC: 20/60
CORRECTION_TYPE: GLASSES
OD_CC: 20/60

## 2022-10-19 ASSESSMENT — TONOMETRY
OS_IOP_MMHG: 16
OD_IOP_MMHG: 18
IOP_METHOD: TONOPEN

## 2022-10-19 ASSESSMENT — CONF VISUAL FIELD
OS_INFERIOR_NASAL_RESTRICTION: 0
METHOD: COUNTING FINGERS
OS_SUPERIOR_TEMPORAL_RESTRICTION: 0
OD_SUPERIOR_NASAL_RESTRICTION: 0
OS_NORMAL: 1
OD_INFERIOR_TEMPORAL_RESTRICTION: 0
OS_INFERIOR_TEMPORAL_RESTRICTION: 0
OD_INFERIOR_NASAL_RESTRICTION: 0
OS_SUPERIOR_NASAL_RESTRICTION: 0
OD_NORMAL: 1
OD_SUPERIOR_TEMPORAL_RESTRICTION: 0

## 2022-10-19 ASSESSMENT — SLIT LAMP EXAM - LIDS
COMMENTS: NORMAL
COMMENTS: NORMAL

## 2022-10-19 ASSESSMENT — CUP TO DISC RATIO
OD_RATIO: 0.3
OS_RATIO: 0.3

## 2022-10-19 ASSESSMENT — EXTERNAL EXAM - RIGHT EYE: OD_EXAM: NORMAL

## 2022-10-19 ASSESSMENT — EXTERNAL EXAM - LEFT EYE: OS_EXAM: NORMAL

## 2022-10-19 NOTE — PROGRESS NOTES
Chief Complaint/Presenting Concern:      Interval History of Present Ocular Illness:  Parul Veliz is a 62 year old patient who returns for follow up of her ampiginous choroiditis and idiopathic intracranial hypertension. We last saw each other on 8/31/2022 at which time we noted an interval increase in atrophy by OCT in the right eye, but no increase in inflammation or new outer retinal lesions on OCT in either eye otherwise. She also had no evidence of recurrence of optic nerve edema off Diamox. We decided to taper the prednisone from 30mg daily until 9/4/22 to 20mg daily until 9/19/22 then 10mg daily until this visit and continued her Azathioprine 50mg twice daily.    She saw Dr. Menjivar on 9/8/22 who noted some improvement in acuity with newer glasses refraction, but still recommended avoiding driving until she is comfortable as a passenger with her vision. If she decides to go back to driving, he would recommend she avoid driving at night or in inclement weather. The older glasses seem to be doing better than the current glasses. Ms. Veliz also Luanne Altamirano who suggested an [employer] technology magnifier to help with work on the computer.     One week ago, Ms. Veliz noticed light sensitivity and blurred vision in both eyes. Denies any increase in floaters or flashing lights.     Interval Updates to Medical/Family/Social History:    She developed acute left-sided weakness on 8/25/22. Her neurologist, Dr. Bailey, told her that she had experienced a CVA and not a TIA that had occurred around/at least 2 weeks prior. She is now back on Plavix, Lipitor with  Low-dose aspirin daily. She will see an interventional neuroradiologist on 10/31/22 who will review her MRIs and discuss potential stent placement.There is some some interval weakness and spasming of the left lower limb when standing or moving that occurs transiently.     Relevant Review of Systems Updates:  Denies changes in baseline shortness of breath, or  any new joint pains or rashes.    Laboratory Testing  Abnormal: 9/14/\/22: Protein C elevated  Normal/negative 9/14/22: ACE, CRP, ESR, ANCA, Cardiolipin Ab, Antithrombin III Activity, Factor II mutation, Factor 5 Activity, Protein S, No informative autoantibodies were detected in recent autoimmune encephalopathy eval     Current eye related medications: Azathioprine 50 mg twice daily, Prednisone 10 mg daily (since 9/19/22) , Aspirin 81mg daily and now back on Plavix     Retina/Uveitis Imaging:   OCT Spectralis Macula October 19, 2022  right eye: Few PEDs, stable atrophy superior to fovea, interval improvement in cysts, no NFL Thickening nor PP fluid. Normal choroidal thickness.  left eye: Stable PEDs, no fluid. Stable atrophy without fluid. Normal choroidal thickness. No NFL Thickening nor PP fluid    OCT Optic Nerve RNFL Spectralis October 19, 2022  right eye: Avg thickness 120 microns, no thinning, minimal change.  left eye: Avg thickness 117 microns, no thinning, stable    Optos Fundus Autofluorescence October 19, 2022  right eye:Stable hypo-AF in macula   left eye: Stable mixed hypo/hyper FAF    Optos Fundus Photos OU (both eyes) October 19, 2022  right eye: Light patches in macula continue to be stable  left eye: Pigment mottling in macula continue to be stable    Optovue OCT-Angiography October 19, 2022  right eye: No abnormal flow signals nor flow voids  left eye:   No abnormal flow signals nor flow voids    Assessment:     1. Ampiginous choroiditis of both eyes  No increase in vitreous inflammation and no CNV even on lower dose of prednisone    2. Retinal macular atrophy  Stable in both eyes    3. IIH (idiopathic intracranial hypertension)  No recurrence off Diamox nearly 3 months    4. High risk medication use  Azathioprine 50 mg twice daily, Prednisone 10 mg daily , no Diamox     Plan/Recommendations:      Discussed findings with patient and her Father. Things are stable.  Exam is stable in both eyes and  Autofluorescence stable. We can continue tapering and then stop prednisone as we continue Azathioprine    There is no sight threatening CNV (no abnormal blood vessels) in the retina.     Parul is doing very well adjusting to the new baseline and we are grateful for all of our colleagues for their help.     Eye pressure is normal in both eyes today     Recommend additional testing: CBC and CMP before next visit.     Continue these medications: Azathioprine 50 mg twice daily without modification of dose. Aspirin 81mg daily and Plavix.     For the prednisone, reduce to 5 mg daily starting tomorrow until Wed, 11/9 ( 3 weeks), then stop.     Regarding Interventional Neurology visit, a variation of this condition called APMPPE can sometimes be associated with Cerebral vasculitis. This would be treated with medications such as Azathioprine, Prednisone, and blood thinners. This may help your Neurology and Neuro-interventional team with decision making. We are not seeing evidence of true retinal vasculitis.    RTC Mid-late December tonopen, dilate, OCT, Photos  (ordered)    Akil Rodriguez MD MPH  Vitreoretinal Fellow PGY-5  [facility]     Attending Physician Attestation:  Complete documentation of historical and exam elements from today's encounter can be found in the full encounter summary report (not reduplicated in this progress note). I reviewed the chief complaint(s) and history of present illness, and  confirmed and edited as necessary the review of systems, past medical/surgical history, family history, social history, and examination findings as documented by others and the treating Resident or Fellow Physician.    I examined the patient myself, discussed the findings, reviewed all ancillary testing data and modified these results and reports along with the assessment and plan with the Treating Resident or Fellow Physician. I agree with the note as detailed above.   Sp Shipman M.D.  Uveitis and  Medical Retina  October 19, 2022

## 2022-10-19 NOTE — NURSING NOTE
Chief Complaints and History of Present Illnesses   Patient presents with     Retinitis/choroiditis Follow Up     Chief Complaint(s) and History of Present Illness(es)     Retinitis/choroiditis Follow Up            Laterality: both eyes    Onset: gradual    Onset: months ago    Quality: For the past 1.5 week the va has decreased    Severity: moderate    Frequency: intermittently    Associated symptoms: photophobia (has increased) and floaters (only at near in the past week in the OS).  Negative for flashes    Treatments tried: no treatments    Pain scale: 0/10          Comments    Here for Ampiginous choroiditis of both eyes   Prednisone 10 mg   Azathioprine  Has has an MRI that confirmed a stroke about a month ago  Janina Webster COT 9:21 AM October 19, 2022

## 2022-10-19 NOTE — PATIENT INSTRUCTIONS
Continue these medications: Azathioprine 50 mg twice daily without modification of dose. Aspirin 81mg daily and Plavix.   For the prednisone, reduce to 5 mg daily starting tomorrow until Wed, 11/9 ( 3 weeks), then stop.   Regarding Interventional Neurology visit, a variation of this condition called APMPPE can sometimes be associated with Cerebral vasculitis. This would be treated with medications such as Azathioprine, Prednisone, and blood thinners. This may help your Neurology and Neuro-interventional team with decision making. We are not seeing evidence of true retinal vasculitis.  Please get these blood tests before next visit: CBC, CMP

## 2022-10-19 NOTE — LETTER
10/19/2022       RE: Parlu Veliz  [address] [address]     Dear Colleague,    Thank you for referring your patient, Parul Veliz, to the [facility] [place] at [facility]. Please see a copy of my visit note below.    Chief Complaint/Presenting Concern:      Interval History of Present Ocular Illness:  Parul Veliz is a 62 year old patient who returns for follow up of her ampiginous choroiditis and idiopathic intracranial hypertension. We last saw each other on 8/31/2022 at which time we noted an interval increase in atrophy by OCT in the right eye, but no increase in inflammation or new outer retinal lesions on OCT in either eye otherwise. She also had no evidence of recurrence of optic nerve edema off Diamox. We decided to taper the prednisone from 30mg daily until 9/4/22 to 20mg daily until 9/19/22 then 10mg daily until this visit and continued her Azathioprine 50mg twice daily.    She saw Dr. Menjivar on 9/8/22 who noted some improvement in acuity with newer glasses refraction, but still recommended avoiding driving until she is comfortable as a passenger with her vision. If she decides to go back to driving, he would recommend she avoid driving at night or in inclement weather. The older glasses seem to be doing better than the current glasses. Ms. Veliz also Luanne Altamirano who suggested an [employer] technology magnifier to help with work on the computer.     One week ago, Ms. Veliz noticed light sensitivity and blurred vision in both eyes. Denies any increase in floaters or flashing lights.     Interval Updates to Medical/Family/Social History:    She developed acute left-sided weakness on 8/25/22. Her neurologist, Dr. Bailey, told her that she had experienced a CVA and not a TIA that had occurred around/at least 2 weeks prior. She is now back on Plavix, Lipitor with  Low-dose aspirin daily. She will see an  interventional neuroradiologist on 10/31/22 who will review her MRIs and discuss potential stent placement.There is some some interval weakness and spasming of the left lower limb when standing or moving that occurs transiently.     Relevant Review of Systems Updates:  Denies changes in baseline shortness of breath, or any new joint pains or rashes.    Laboratory Testing  Abnormal: 9/14/\/22: Protein C elevated  Normal/negative 9/14/22: ACE, CRP, ESR, ANCA, Cardiolipin Ab, Antithrombin III Activity, Factor II mutation, Factor 5 Activity, Protein S, No informative autoantibodies were detected in recent autoimmune encephalopathy eval     Current eye related medications: Azathioprine 50 mg twice daily, Prednisone 10 mg daily (since 9/19/22) , Aspirin 81mg daily and now back on Plavix     Retina/Uveitis Imaging:   OCT Spectralis Macula October 19, 2022  right eye: Few PEDs, stable atrophy superior to fovea, interval improvement in cysts, no NFL Thickening nor PP fluid. Normal choroidal thickness.  left eye: Stable PEDs, no fluid. Stable atrophy without fluid. Normal choroidal thickness. No NFL Thickening nor PP fluid    OCT Optic Nerve RNFL Spectralis October 19, 2022  right eye: Avg thickness 120 microns, no thinning, minimal change.  left eye: Avg thickness 117 microns, no thinning, stable    Optos Fundus Autofluorescence October 19, 2022  right eye:Stable hypo-AF in macula   left eye: Stable mixed hypo/hyper FAF    Optos Fundus Photos OU (both eyes) October 19, 2022  right eye: Light patches in macula continue to be stable  left eye: Pigment mottling in macula continue to be stable    Optovue OCT-Angiography October 19, 2022  right eye: No abnormal flow signals nor flow voids  left eye:   No abnormal flow signals nor flow voids    Assessment:     1. Ampiginous choroiditis of both eyes  No increase in vitreous inflammation and no CNV even on lower dose of prednisone    2. Retinal macular atrophy  Stable in both  eyes    3. IIH (idiopathic intracranial hypertension)  No recurrence off Diamox nearly 3 months    4. High risk medication use  Azathioprine 50 mg twice daily, Prednisone 10 mg daily , no Diamox         Plan/Recommendations:      Discussed findings with patient and her Father. Things are stable.  Exam is stable in both eyes and Autofluorescence stable. We can continue tapering and then stop prednisone as we continue Azathioprine    There is no sight threatening CNV (no abnormal blood vessels) in the retina.     Parul is doing very well adjusting to the new baseline and we are grateful for all of our colleagues for their help.     Eye pressure is normal in both eyes today     Recommend additional testing: CBC and CMP before next visit.     Continue these medications: Azathioprine 50 mg twice daily without modification of dose. Aspirin 81mg daily and Plavix.     For the prednisone, reduce to 5 mg daily starting tomorrow until Wed, 11/9 ( 3 weeks), then stop.     Regarding Interventional Neurology visit, a variation of this condition called APMPPE can sometimes be associated with Cerebral vasculitis. This would be treated with medications such as Azathioprine, Prednisone, and blood thinners. This may help your Neurology and Neuro-interventional team with decision making. We are not seeing evidence of true retinal vasculitis.    RTC Mid-late December tonopen, dilate, OCT, Photos  (ordered)    Akil Rodriguez MD MPH  Vitreoretinal Fellow PGY-5  [facility]     Attending Physician Attestation:  Complete documentation of historical and exam elements from today's encounter can be found in the full encounter summary report (not reduplicated in this progress note). I personally obtained the chief complaint(s) and history of present illness. I confirmed and edited as necessary the review of systems, past medical/surgical history, family history, social history, and examination findings as documented by others;  and I examined the patient myself. I personally reviewed the relevant tests, images, and reports as documented above. I formulated and edited as necessary the assessment and plan and discussed the findings and management plan with the patient and family.  Sp Shipman MD.      Again, thank you for allowing me to participate in the care of your patient.      Sincerely,    Sp Shipman MD  [facility] Dept of Ophthalmology  Uveitis and Medical Retina

## 2022-10-24 ENCOUNTER — HOSPITAL ENCOUNTER (OUTPATIENT)
Dept: OCCUPATIONAL THERAPY | Facility: CLINIC | Age: 62
Setting detail: THERAPIES SERIES
Discharge: HOME | End: 2022-10-24
Attending: OPHTHALMOLOGY
Payer: COMMERCIAL

## 2022-10-24 PROCEDURE — 97535 SELF CARE MNGMENT TRAINING: CPT | Mod: GO | Performed by: OCCUPATIONAL THERAPIST

## 2022-11-20 ENCOUNTER — HEALTH MAINTENANCE LETTER (OUTPATIENT)
Age: 62
End: 2022-11-20

## 2022-12-13 ENCOUNTER — OFFICE VISIT (OUTPATIENT)
Dept: OPHTHALMOLOGY | Facility: CLINIC | Age: 62
End: 2022-12-13
Attending: OPHTHALMOLOGY
Payer: COMMERCIAL

## 2022-12-13 DIAGNOSIS — Z79.899 HIGH RISK MEDICATION USE: ICD-10-CM

## 2022-12-13 DIAGNOSIS — H35.89 RETINAL MACULAR ATROPHY: ICD-10-CM

## 2022-12-13 DIAGNOSIS — H30.143 AMPIGINOUS CHOROIDITIS OF BOTH EYES: Primary | ICD-10-CM

## 2022-12-13 DIAGNOSIS — G93.2 IIH (IDIOPATHIC INTRACRANIAL HYPERTENSION): ICD-10-CM

## 2022-12-13 PROCEDURE — 92250 FUNDUS PHOTOGRAPHY W/I&R: CPT | Performed by: OPHTHALMOLOGY

## 2022-12-13 PROCEDURE — 92134 CPTRZ OPH DX IMG PST SGM RTA: CPT | Performed by: OPHTHALMOLOGY

## 2022-12-13 PROCEDURE — 99214 OFFICE O/P EST MOD 30 MIN: CPT | Performed by: OPHTHALMOLOGY

## 2022-12-13 PROCEDURE — G0463 HOSPITAL OUTPT CLINIC VISIT: HCPCS | Mod: 25

## 2022-12-13 RX ORDER — AZATHIOPRINE 50 MG/1
TABLET ORAL
Qty: 270 TABLET | Refills: 1 | Status: SHIPPED | OUTPATIENT
Start: 2022-12-13 | End: 2023-05-19

## 2022-12-13 ASSESSMENT — CONF VISUAL FIELD
OS_INFERIOR_TEMPORAL_RESTRICTION: 0
OD_INFERIOR_TEMPORAL_RESTRICTION: 0
OS_SUPERIOR_TEMPORAL_RESTRICTION: 0
OS_NORMAL: 1
OD_NORMAL: 1
OS_SUPERIOR_NASAL_RESTRICTION: 0
OS_INFERIOR_NASAL_RESTRICTION: 0
OD_SUPERIOR_NASAL_RESTRICTION: 0
METHOD: COUNTING FINGERS
OD_INFERIOR_NASAL_RESTRICTION: 0
OD_SUPERIOR_TEMPORAL_RESTRICTION: 0

## 2022-12-13 ASSESSMENT — VISUAL ACUITY
OD_SC+: -2
OS_CC: 20/40
METHOD: SNELLEN - LINEAR
OD_CC: 20/40
OD_SC: 20/30 SLOW
CORRECTION_TYPE: GLASSES

## 2022-12-13 ASSESSMENT — EXTERNAL EXAM - RIGHT EYE: OD_EXAM: NORMAL

## 2022-12-13 ASSESSMENT — TONOMETRY
OD_IOP_MMHG: 17
OS_IOP_MMHG: 19
IOP_METHOD: TONOPEN

## 2022-12-13 ASSESSMENT — CUP TO DISC RATIO
OD_RATIO: 0.3
OS_RATIO: 0.3

## 2022-12-13 ASSESSMENT — EXTERNAL EXAM - LEFT EYE: OS_EXAM: NORMAL

## 2022-12-13 ASSESSMENT — SLIT LAMP EXAM - LIDS
COMMENTS: NORMAL
COMMENTS: NORMAL

## 2022-12-13 NOTE — PATIENT INSTRUCTIONS
Continue Azathioprine but increase to 100 mg in the AM and 50 mg in the PM given changes right eye as well as joint pain. This may help provide better inflammation control in the long term.   No need for prednisone now

## 2022-12-13 NOTE — LETTER
12/13/2022       RE: Parul Veliz  [address] [facility] [address]     Dear Colleague,    Thank you for referring your patient, Parul Veliz, to the [facility] [place] at [facility]. Please see a copy of my visit note below.    Chief Complaint/Presenting Concern:  Uveitis follow up    Interval History of Present Ocular Illness:  Parul Veliz is a 62 year old patient who returns for follow up of her ampiginous choroiditis. At last visit, we recommended continued Azathioprine 50 mg twice daily and tapering off prednisone.    Since then, Ms. Veliz reports things are possibly getting more blurry up close and wondering about a recheck with Dr. Menjivar. Low vision aids work well when just looking at one screen, but are less helpful when going back and forth between monitors. Right eye seems better without glasses.     Interval Updates to Medical/Family/Social History:    Recently travelled to [place] for a few days but this was a tiring trip. Work has done well to help create a supportive environment. Still recovering from that time.     Relevant Review of Systems Updates:  Some more joint pain off prednisone, no fevers. Weakness in legs    Laboratory Testing: November 2022: Normal CBC, CMP WNL     Current eye related medications: Aspirin 81 mg daily, Plavix 75 mg daily, Azathioprine 50 mg twice daily, no prednisone    Retina/Uveitis Imaging:   OCT Spectralis Macula December 13, 2022  right eye: Few drusenoid changes, stable atrophy, some new area of hypo-reflective space just under fovea, but no adjacent changes. No NFL Thickening, no PP fluid. No choroidal thickening  left eye: Few drusenoid changes, stable atrophy, no fluid. No NFL Thickening, no PP fluid. No choroidal thickening    OCT-Angiography. December 13, 2022  right eye: No abnormal flow signal, no flow voids  left eye: No abnormal flow signal, no flow voids    Optos Photos  and FAF December 13, 2022  right eye: Drusenoid changes central and hypo-pigmented spots peripheral, Stable central hypo-FAF   left eye: Drusenoid changes central and hypo-pigmented spots peripheral, Stable central hypo-FAF, less hyper-FAF in macula    Assessment:     1. Ampiginous choroiditis of both eyes  No new spots, no retinal vessel inflammation     2. Retinal macular atrophy  Some progression of atrophy right eye more than left eye     3. IIH (idiopathic intracranial hypertension)  No recurrence of disc edema    4. High risk medication use  Azathioprine 50 mg twice daily    Plan/Recommendations:      Discussed findings with patient and her Father. There are no new inflammatory spots and atrophy generally stable. There is a small hypo-lucent area on OCT without fluid or blood on exam. OCT-A without abnormal flow signal to suggest secondary choroidal neovascularization. Overall, things are stable in each eye     Eye pressure is normal in each eye     Recommend additional testing: No labs needed    Continue Azathioprine but increase to 100 mg in the AM and 50 mg in the PM given atrophic changes right eye as well as joint pain. This may help provide better inflammation and tissue preservation control in the long term.     No need for prednisone now    RTC    1. Dr. Menjivar  February 23 to determine if updated glasses prescription may be of benefit.    2. Add on after Dr. Menjivar Feb 23 (late AM) Tonopen, dilate, OCT, OCT-A Optovue, Photos --all ordered                  Physician Attestation     Attending Physician Attestation:  Complete documentation of historical and exam elements from today's encounter can be found in the full encounter summary report (not reduplicated in this progress note). I personally obtained the chief complaint(s) and history of present illness. I confirmed and edited as necessary the review of systems, past medical/surgical history, family history, social history, and examination findings  as documented by others; and I examined the patient myself. I personally reviewed the relevant tests, images, and reports as documented above. I formulated and edited as necessary the assessment and plan and discussed the findings and management plan with the patient and family members present at the time of this visit.  Sp Shipman M.D., Uveitis and Medical Retina, December 13, 2022     Again, thank you for allowing me to participate in the care of your patient.      Sincerely,    Sp Shipman MD  [facility] Dept of Ophthalmology  Uveitis and Medical Retina

## 2022-12-13 NOTE — PROGRESS NOTES
Chief Complaint/Presenting Concern:  Uveitis follow up    Interval History of Present Ocular Illness:  Parul Veliz is a 62 year old patient who returns for follow up of her ampiginous choroiditis. At last visit, we recommended continued Azathioprine 50 mg twice daily and tapering off prednisone.    Since then, Ms. Veliz reports things are possibly getting more blurry up close and wondering about a recheck with Dr. Menjivar. Low vision aids work well when just looking at one screen, but are less helpful when going back and forth between monitors. Right eye seems better without glasses.     Interval Updates to Medical/Family/Social History:    Recently travelled to [place] for a few days but this was a tiring trip. Work has done well to help create a supportive environment. Still recovering from that time.     Relevant Review of Systems Updates:  Some more joint pain off prednisone, no fevers. Weakness in legs    Laboratory Testing: November 2022: Normal CBC, CMP WNL     Current eye related medications: Aspirin 81 mg daily, Plavix 75 mg daily, Azathioprine 50 mg twice daily, no prednisone    Retina/Uveitis Imaging:   OCT Spectralis Macula December 13, 2022  right eye: Few drusenoid changes, stable atrophy, some new area of hypo-reflective space just under fovea, but no adjacent changes. No NFL Thickening, no PP fluid. No choroidal thickening  left eye: Few drusenoid changes, stable atrophy, no fluid. No NFL Thickening, no PP fluid. No choroidal thickening    OCT-Angiography. December 13, 2022  right eye: No abnormal flow signal, no flow voids  left eye: No abnormal flow signal, no flow voids    Optos Photos and FAF December 13, 2022  right eye: Drusenoid changes central and hypo-pigmented spots peripheral, Stable central hypo-FAF   left eye: Drusenoid changes central and hypo-pigmented spots peripheral, Stable central hypo-FAF, less hyper-FAF in macula    Assessment:     1. Ampiginous choroiditis of both eyes  No  new spots, no retinal vessel inflammation     2. Retinal macular atrophy  Some progression of atrophy right eye more than left eye     3. IIH (idiopathic intracranial hypertension)  No recurrence of disc edema    4. High risk medication use  Azathioprine 50 mg twice daily    Plan/Recommendations:      Discussed findings with patient and her Father. There are no new inflammatory spots and atrophy generally stable. There is a small hypo-lucent area on OCT without fluid or blood on exam. OCT-A without abnormal flow signal to suggest secondary choroidal neovascularization. Overall, things are stable in each eye     Eye pressure is normal in each eye     Recommend additional testing: No labs needed    Continue Azathioprine but increase to 100 mg in the AM and 50 mg in the PM given atrophic changes right eye as well as joint pain. This may help provide better inflammation and tissue preservation control in the long term.     No need for prednisone now    RTC    1. Dr. Menjivar  February 23 to determine if updated glasses prescription may be of benefit.    2. Add on after Dr. Menjivar Feb 23 (late AM) Tonopen, dilate, OCT, OCT-A Optovue, Photos --all ordered    Physician Attestation     Attending Physician Attestation:  Complete documentation of historical and exam elements from today's encounter can be found in the full encounter summary report (not reduplicated in this progress note). I personally obtained the chief complaint(s) and history of present illness. I confirmed and edited as necessary the review of systems, past medical/surgical history, family history, social history, and examination findings as documented by others; and I examined the patient myself. I personally reviewed the relevant tests, images, and reports as documented above. I formulated and edited as necessary the assessment and plan and discussed the findings and management plan with the patient and family members present at the time of this  visit.  Sp Shipman M.D., Uveitis and Medical Retina, December 13, 2022

## 2023-02-21 ENCOUNTER — TELEPHONE (OUTPATIENT)
Dept: OPHTHALMOLOGY | Facility: CLINIC | Age: 63
End: 2023-02-21
Payer: COMMERCIAL

## 2023-02-21 NOTE — TELEPHONE ENCOUNTER
Spoke with patient regarding confirming current appointment with potential weather winter storm. Rescheduled patient accordingly and patient is aware of date, time and location.-Per Patient

## 2023-04-11 ENCOUNTER — OFFICE VISIT (OUTPATIENT)
Dept: OPHTHALMOLOGY | Facility: CLINIC | Age: 63
End: 2023-04-11
Attending: OPHTHALMOLOGY
Payer: COMMERCIAL

## 2023-04-11 ENCOUNTER — OFFICE VISIT (OUTPATIENT)
Dept: OPTOMETRY | Facility: CLINIC | Age: 63
End: 2023-04-11
Payer: COMMERCIAL

## 2023-04-11 DIAGNOSIS — H30.143 AMPIGINOUS CHOROIDITIS OF BOTH EYES: Primary | ICD-10-CM

## 2023-04-11 DIAGNOSIS — H30.91 CHOROIDAL NEOVASCULARIZATION OF RIGHT EYE DUE TO CHORIORETINITIS: ICD-10-CM

## 2023-04-11 DIAGNOSIS — H35.051 CHOROIDAL NEOVASCULARIZATION OF RIGHT EYE DUE TO CHORIORETINITIS: ICD-10-CM

## 2023-04-11 DIAGNOSIS — Z79.899 HIGH RISK MEDICATION USE: ICD-10-CM

## 2023-04-11 DIAGNOSIS — G93.2 IIH (IDIOPATHIC INTRACRANIAL HYPERTENSION): ICD-10-CM

## 2023-04-11 DIAGNOSIS — H52.4 PRESBYOPIA: ICD-10-CM

## 2023-04-11 DIAGNOSIS — H35.89 RETINAL MACULAR ATROPHY: ICD-10-CM

## 2023-04-11 PROCEDURE — 92250 FUNDUS PHOTOGRAPHY W/I&R: CPT | Mod: 59 | Performed by: OPHTHALMOLOGY

## 2023-04-11 PROCEDURE — 99207 FUNDUS PHOTOS OU (BOTH EYES): CPT | Mod: 26 | Performed by: OPHTHALMOLOGY

## 2023-04-11 PROCEDURE — 99212 OFFICE O/P EST SF 10 MIN: CPT | Performed by: OPHTHALMOLOGY

## 2023-04-11 PROCEDURE — 92134 CPTRZ OPH DX IMG PST SGM RTA: CPT | Performed by: OPHTHALMOLOGY

## 2023-04-11 PROCEDURE — 99214 OFFICE O/P EST MOD 30 MIN: CPT | Performed by: OPHTHALMOLOGY

## 2023-04-11 RX ORDER — PREDNISONE 10 MG/1
TABLET ORAL
Qty: 50 TABLET | Refills: 0 | Status: SHIPPED | OUTPATIENT
Start: 2023-04-11 | End: 2023-05-19

## 2023-04-11 ASSESSMENT — REFRACTION_MANIFEST
OD_AXIS: 005
OS_AXIS: 005
OD_CYLINDER: +0.75
OD_AXIS: 020
OS_CYLINDER: +0.75
OS_SPHERE: +3.00
OS_CYLINDER: +0.75
OS_AXIS: 020
OD_CYLINDER: +0.75
OD_SPHERE: +1.50
OS_SPHERE: +2.25
OD_SPHERE: +2.75

## 2023-04-11 ASSESSMENT — VISUAL ACUITY
OD_PH_CC: 20/40
CORRECTION_TYPE: GLASSES
OD_PH_CC+: -2
METHOD: SNELLEN - LINEAR
OS_CC+: +1
METHOD: SNELLEN - LINEAR
OD_PH_CC: 20/40-2
OS_CC: 20/40+1
OD_CC: 20/50+0
OD_CC: 20/50
OS_CC: 20/40

## 2023-04-11 ASSESSMENT — REFRACTION_WEARINGRX
OS_SPHERE: +1.25
OS_SPHERE: +1.25
OD_AXIS: 005
OS_CYLINDER: +1.00
OD_CYLINDER: +1.25
OD_SPHERE: +0.50
OD_AXIS: 005
OS_CYLINDER: +1.00
OS_AXIS: 020
OS_AXIS: 020
SPECS_TYPE: COMPUTER
OD_SPHERE: +0.50
OD_CYLINDER: +1.25
SPECS_TYPE: COMPUTER

## 2023-04-11 ASSESSMENT — EXTERNAL EXAM - LEFT EYE: OS_EXAM: NORMAL

## 2023-04-11 ASSESSMENT — TONOMETRY
OS_IOP_MMHG: 15
IOP_METHOD: ICARE
IOP_METHOD: ICARE
OD_IOP_MMHG: 10
OD_IOP_MMHG: 10
OS_IOP_MMHG: 15

## 2023-04-11 ASSESSMENT — CONF VISUAL FIELD
OS_SUPERIOR_NASAL_RESTRICTION: 0
OS_SUPERIOR_TEMPORAL_RESTRICTION: 0
OD_INFERIOR_TEMPORAL_RESTRICTION: 0
OD_SUPERIOR_NASAL_RESTRICTION: 0
OD_INFERIOR_NASAL_RESTRICTION: 0
OD_SUPERIOR_TEMPORAL_RESTRICTION: 0
OS_INFERIOR_TEMPORAL_RESTRICTION: 0
OD_NORMAL: 1
OS_INFERIOR_NASAL_RESTRICTION: 0
OS_NORMAL: 1

## 2023-04-11 ASSESSMENT — SLIT LAMP EXAM - LIDS
COMMENTS: NORMAL
COMMENTS: NORMAL

## 2023-04-11 ASSESSMENT — CUP TO DISC RATIO
OD_RATIO: 0.3
OS_RATIO: 0.3

## 2023-04-11 ASSESSMENT — EXTERNAL EXAM - RIGHT EYE: OD_EXAM: NORMAL

## 2023-04-11 NOTE — PROGRESS NOTES
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 Shipman  Plan: Continue care with retina clinic- patient is following up later today in [facility]. Stable best corrected vision noted today (20/40 each eye). Continued use of glare protection for photophobia recommended. Caution with driving at periods of kennedi and dusk was encouraged. Return with vision changes.     (H52.4) Presbyopia  Comment: Slight bump to near add (to +2.50 for intermediate, +3.75 for near) was preferred today.   Plan: Updated Rx for distance/near bifocal and intermediate/near bifocal. Separate pairs are preferred for patient given her profession as an .           30 minutes were spent on the date of the encounter doing chart review, history and exam, documentation, and further activities as noted above.    Complete documentation of historical and exam elements from today's encounter can  be found in the full encounter summary report (not reduplicated in this progress  note). I personally obtained the chief complaint(s) and history of present illness. I  confirmed and edited as necessary the review of systems, past medical/surgical  history, family history, social history, and examination findings as documented by  others; and I examined the patient myself. I personally reviewed the relevant tests,  images, and reports as documented above. I formulated and edited as necessary the  assessment and plan and discussed the findings and management plan with the  patient and family.    Jeffrey Menjivar OD

## 2023-04-11 NOTE — NURSING NOTE
Chief Complaints and History of Present Illnesses   Patient presents with     Retinitis/choroiditis Follow Up     Chief Complaint(s) and History of Present Illness(es)     Retinitis/choroiditis Follow Up            Laterality: both eyes    Onset: gradual    Onset: months ago    Severity: moderate    Frequency: intermittently    Pain scale: 0/10          Comments    Here for Ampiginous choroiditis of both eyes   Will see Dr. ENGEL at 2:00pm today. Wears PALs while driving only.     Reading/computer vision has changed. Is using a +5.00 to read at near. Is becoming more light sensitive.   Last edited by Seda Mitchell on 4/11/2023  1:02 PM.     Azathioprine

## 2023-04-11 NOTE — LETTER
4/11/2023       RE: Parul Veliz  [address] [facility] [address]     Dear Colleague,    Thank you for referring your patient, Parul Veliz, to the [facility] [place] at [facility]. Please see a copy of my visit note below.    Chief Complaint/Presenting Concern: Uveitis follow-up    Interval History of Present Ocular Illness:  Parul Veliz is a 62 year old patient who returns for follow up of her Ampiginous Choroiditis of each eye. We last saw each other on December 13, 2022 at which time there were no new spots in the retina and no active retinal vessel inflammation.  There was a slight progression of atrophy in the macula in the right eye and no recurrence of optic disc edema in either eye.  We increased the dose of azathioprine to 150 mg in the morning and continue 50 mg in the evening.    Ms. Veliz reports there is more light sensitivity and perhaps a need for stronger reading add. Ms. Veliz saw Dr. Menjivar today who noted an improvement in the visual acuity with best possible correction of 20/40 in both eyes.     Interval Updates to Medical/Family/Social History:    1. Awaiting Neurology Visit.   2. Things at work going well.     Relevant Review of Systems Updates:  Some joint pain, some leg and arm weakness awaiting Neurology and wondering if related to Lipitor, Azathioprine, or both. No significant worsening of stomach issues.     Labs: None since last visit    Current eye related medications: Azathioprine 100 mg in the morning and 50 mg in the evening (sometimes misses evening dose)    Retina/Uveitis Imaging:   Optovue OCT-A April 11, 2023  right eye: No flow void, small flow signal in choriocapillaris. One area superior window defect   left eye: No flow voids, no abnormal flow signal    OCT Spectralis Macula April 11, 2023  right eye: No NFL Thickening.   left eye: No NFL Thickening. Focal atrophy with fibrosis and small  cyst overlying, no new spots    Optos Fundus Autofluorescence April 11, 2023  right eye: Slight increase in hypo-FAF, mottled hyper FAF in macula, but no new areas of hyper-FAF  left eye:  Slight increase in hypo-FAF, mottled hyper FAF in macula, but no new areas of hyper-FAF    Optos Fundus Photos OU (both eyes) April 11, 2023  right eye:Focal atrophy, mottling  left eye: Focal atrophy, mottling    Assessment:     1. Ampiginous choroiditis of both eyes  No new spots with subtle inflammation     2. Retinal macular atrophy  Small change in each eye     3. 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 IIH (idiopathic intracranial hypertension)  No recurrence of disc edema    5. High risk medication use  Azathioprine 100 mg in the AM and 50 mg in the PM most days    Plan/Recommendations:    Discussed findings with patient. There is subtle inflammation, no new spots, and small change in atrophy. There is a small change in subretinal fluid in the right eye. This is likely a change related to inflammation causing abnormal blood vessel leakage. We will try a short course of prednisone but discussed injections of Avastin in the right eye if this does not help. We will monitor closely   Eye pressure is 10, 15. We can monitor eye pressure  There is no recurrence of the idiopathic idiopathic inflammation, so no Diamox needed  Recommend additional testing: CBC, CMP for routine lab monitoring while on azathioprine  Continue these medications: Azathioprine 100 mg in the AM and 50 mg in the evening. Perhaps your Doctors might consider dose reduction of Lipitor to see if this can help muscle pain as these symptoms started since adding Lipitor (although now on higher dose azathioprine)  Start a course of oral prednisone to see if this can help inflammation and abnormal blood vessels in the retina of the right eye: Take 3 pills (30 mg) each AM with  food for 1 week, then 2 pills (20 mg) each AM with food for one week, then 1 pill (10 mg) each AM with food, and then stop  You can take AREDS 2 Preservision or Ocuvite to provide extra nutrients for the ye  It is okay to take one of your medications to help with nerves ahead of next visit.   Okay to update your prescription from Dr. Menjivar  We will see you next month to consider an Avastin injection in the right eye if not better    RTC 1 month OCT, Optovue OCT-A ?Avastin right eye       Physician Attestation     Attending Physician Attestation:  Complete documentation of historical and exam elements from today's encounter can be found in the full encounter summary report (not reduplicated in this progress note). I personally obtained the chief complaint(s) and history of present illness. I confirmed and edited as necessary the review of systems, past medical/surgical history, family history, social history, and examination findings as documented by others; and I examined the patient myself. I personally reviewed the relevant tests, images, and reports as documented above. I formulated and edited as necessary the assessment and plan and discussed the findings and management plan with the patient and family members present at the time of this visit.  Sp Shipman M.D., Uveitis and Medical Retina, April 11, 2023     Again, thank you for allowing me to participate in the care of your patient.      Sincerely,    Sp Shipman MD  [facility] Dept of Ophthalmology  Uveitis and Medical Retina

## 2023-04-11 NOTE — PATIENT INSTRUCTIONS
Recommend additional testing: CBC, CMP for routine lab monitoring while on azathioprine  Continue these medications: Azathioprine 100 mg in the AM and 50 mg in the evening. Perhaps your Doctors might consider dose reduction of Lipitor to see if this can help muscle pain as these symptoms started since adding Lipitor (although now on higher dose azathioprine)  Start a course of oral prednisone to see if this can help inflammation and abnormal blood vessels in the retina of the right eye: Take 3 pills (30 mg) each AM with food for 1 week, then 2 pills (20 mg) each AM with food for one week, then 1 pill (10 mg) each AM with food, and then stop  You can take AREDS 2 Preservision or Ocuvite to provide extra nutrients for the ye  It is okay to take one of your medications to help with nerves ahead of next visit.   Okay to update your prescription from Dr. Menjivar  We will see you next month to consider an Avastin injection in the right eye if not better

## 2023-04-11 NOTE — PROGRESS NOTES
Chief Complaint/Presenting Concern: Uveitis follow-up    Interval History of Present Ocular Illness:  Parul Veliz is a 62 year old patient who returns for follow up of her Ampiginous Choroiditis of each eye. We last saw each other on December 13, 2022 at which time there were no new spots in the retina and no active retinal vessel inflammation.  There was a slight progression of atrophy in the macula in the right eye and no recurrence of optic disc edema in either eye.  We increased the dose of azathioprine to 150 mg in the morning and continue 50 mg in the evening.    Ms. Veliz reports there is more light sensitivity and perhaps a need for stronger reading add. Ms. Veliz saw Dr. Menjivar today who noted an improvement in the visual acuity with best possible correction of 20/40 in both eyes.     Interval Updates to Medical/Family/Social History:    1. Awaiting Neurology Visit.   2. Things at work going well.     Relevant Review of Systems Updates:  Some joint pain, some leg and arm weakness awaiting Neurology and wondering if related to Lipitor, Azathioprine, or both. No significant worsening of stomach issues.     Labs: None since last visit    Current eye related medications: Azathioprine 100 mg in the morning and 50 mg in the evening (sometimes misses evening dose)    Retina/Uveitis Imaging:   Optovue OCT-A April 11, 2023  right eye: No flow void, small flow signal in choriocapillaris. One area superior window defect   left eye: No flow voids, no abnormal flow signal    OCT Spectralis Macula April 11, 2023  right eye: No NFL Thickening.   left eye: No NFL Thickening. Focal atrophy with fibrosis and small cyst overlying, no new spots    Optos Fundus Autofluorescence April 11, 2023  right eye: Slight increase in hypo-FAF, mottled hyper FAF in macula, but no new areas of hyper-FAF  left eye:  Slight increase in hypo-FAF, mottled hyper FAF in macula, but no new areas of hyper-FAF    Optos Fundus Photos OU (both  eyes) April 11, 2023  right eye:Focal atrophy, mottling  left eye: Focal atrophy, mottling    Assessment:     1. Ampiginous choroiditis of both eyes  No new spots with subtle inflammation     2. Retinal macular atrophy  Small change in each eye     3. 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 IIH (idiopathic intracranial hypertension)  No recurrence of disc edema    5. High risk medication use  Azathioprine 100 mg in the AM and 50 mg in the PM most days    Plan/Recommendations:      Discussed findings with patient. There is subtle inflammation, no new spots, and small change in atrophy. There is a small change in subretinal fluid in the right eye. This is likely a change related to inflammation causing abnormal blood vessel leakage. We will try a short course of prednisone but discussed injections of Avastin in the right eye if this does not help. We will monitor closely     Eye pressure is 10, 15. We can monitor eye pressure    There is no recurrence of the idiopathic idiopathic inflammation, so no Diamox needed    Recommend additional testing: CBC, CMP for routine lab monitoring while on azathioprine    Continue these medications: Azathioprine 100 mg in the AM and 50 mg in the evening. Perhaps your Doctors might consider dose reduction of Lipitor to see if this can help muscle pain as these symptoms started since adding Lipitor (although now on higher dose azathioprine)    Start a course of oral prednisone to see if this can help inflammation and abnormal blood vessels in the retina of the right eye: Take 3 pills (30 mg) each AM with food for 1 week, then 2 pills (20 mg) each AM with food for one week, then 1 pill (10 mg) each AM with food, and then stop    You can take AREDS 2 Preservision or Ocuvite to provide extra nutrients for the ye    It is okay to take one of your medications to help with nerves ahead of next visit.      Okay to update your prescription from Dr. Menjivar    We will see you next month to consider an Avastin injection in the right eye if not better    RTC 1 month OCT, Optovue OCT-A ?Avastin right eye     Physician Attestation     Attending Physician Attestation:  Complete documentation of historical and exam elements from today's encounter can be found in the full encounter summary report (not reduplicated in this progress note). I personally obtained the chief complaint(s) and history of present illness. I confirmed and edited as necessary the review of systems, past medical/surgical history, family history, social history, and examination findings as documented by others; and I examined the patient myself. I personally reviewed the relevant tests, images, and reports as documented above. I formulated and edited as necessary the assessment and plan and discussed the findings and management plan with the patient and family members present at the time of this visit.  Sp Shipman M.D., Uveitis and Medical Retina, April 11, 2023

## 2023-05-01 NOTE — PROGRESS NOTES
[facility]      OCCUPATIONAL THERAPY VISUAL REHABILITATION DISCHARGE SUMMARY     Patient: Rene Gray  : 1960  Insurance:   Payer/Plan Subscriber Name Rel Member # Group #   BCBS - BCBS OUT OF * RENE GRAY Self [id number]       [address] [id number]       Beginning/End Dates of Reporting Period:  22 to 2023 (last seen on 10/24/22 - chart kept open this long in case patient had other things that came up)    Therapy Diagnosis:    Retinal macular atrophy (H35.89)     Client Self Report: 10/17/22: Reports she has been applying concepts for contrast after our last session- cutting board and toothbrush, etc. and it has helped.  She is still struggling to see her computer for work despite getting new glasses (saw Dr. Menjivar ).  Still has to hold a hand-held magnifier over her computer screen which gets tiring and sore for her arms.  Unfortunately patient suffered a stroke since last seen, is still being worked up for that.  Has a little difficulty with one of her legs but overall does not affect her mobility.  Driving per okay from Dr. Menjivar after feeling okay as a passenger with her new glasses.  She is so happy to be driving again, but avoids driving at night.  Has a very hard time with headlights.    GOALS     Goal 1 - Near vision        Near Vision Goal Comment: Patient will demonstrate 3 pieces of adaptive equipment and/or adaptive techniques in regards to magnification, lighting, contrast and glare for increased ADL/IADL independence with near vision tasks (reading, meal prep, writing).   Target Date: 23    GOAL PROGRESS: Goal met.  Please see adaptive equipment/optical devices recommended below for details of education completed and recommendations made.    Goal 2 - Visual field                     Goal 3 - Environmental modification        Environmental Modification Goal  Comment: Patient will demonstrate and/or verbalize 3 environmentally-based ADL modifications to improve visual-based ADL/IADL activities.   Target Date: 01/08/23    GOAL PROGRESS: Goal met.  Please see adaptive equipment/optical devices recommended below for details of education completed and recommendations made.    Goal 4 - Resource education    Goal Description: Resource education: Patient will verbalize awareness of community resources for the following:, Access to large print materials, Audio access to print materials, Access to low vision devices, Transportation alternatives, Support in vocational goals       Target Date: 01/08/23     GOAL PROGRESS: Goal met.  Please see adaptive equipment/optical devices recommended below for details of education completed and recommendations made.    Goal 5 - Distance viewing        Distance Viewing Goal Comment: Pt will demonstrate increased independence in distance viewing for safety and leisure during ADL/IADLs through independent use of at least one adaptive technique or AE.   Target Date: 01/08/23    GOAL PROGRESS: Goal met.  Please see adaptive equipment/optical devices recommended below for details of education completed and recommendations made.    Goal 6 - Cognition                     Goal 7                 Goal 8                   Progress Toward Goals  All goals met  Progress: Patient seen for 3 sessions.    Reason for Discharge  Patient has met all goals.    Adaptive Equipment/Optical Devices Recommended:  filters due to photophobia: Light gray best for cloudy days, Yellow sunglasses for computer - can get more sturdy frame on [employer]ical.[employer]; AE/strategies for writing visibility: Graphite pencil, Writing: print, bigger letters, bold pen/pencils, bigger spaces between letters and words, Check writing: large print checks from the bank or check writing template; kitchen/meal prep AE and strategies: High contrast measuring cups and spoons are helpful, Black letter  measuring cups for liquid; IrisVision Glasses results/improvements noted per acuity measurements:  For reading (16  using MNRead chart): without the Iris, she read .8M size print @ ~40 words per minute (wpm).  With the Iris, she read .5M size (20/25) at 214 wpm - much less effort required. For computer distance (~23  from her eyes): 1M using computer glasses on MNRead @ 40 wpm.  With Iris: read .6M at 75wpm using distance glasses (best) - much more efficient. For intermediate distance/distance viewing using Intermediate distance chart: without the Iris: 20/30 -1 and with the Iris: 20/20 -1 @8x magnification on the Iris with much more clarity and efficiency (no head movements needed with the Iris). Computer adaptations: Could consider and look at other monitors - go to [employer] and see higher resolution especially with large font/size, Screen readers - can do on basic word through ease of access/through the magnifier, Zoom Text - program for low vision  - check at the Low Vision Store for more options for size and color contrast and screen reader, ambient lighting while on computer, MaxDetail Eschenbach glasses 2x very successful today for computer distance/viewing, mouse pointer and cursor options (larger, different color, turn on option to push control key and it will highlight, etc.), Night light is the blue light filter; cell phone adaptations: Consider more voice features (for weather, alarms, etc.); Electronic magnification for all distances: IrisVision Goggles - worked very well today (when reading a chart calibrated for 5 feet with her prescription glasses: Patient able to read to line 4 (-2) and with iris vision goggles: Patient able to read line 6 (-1) with great clarity); To decrease eyestrain, increase visibility and independence for visual based ADLs/IADLs: 20/20/20 rule while on computer: Every 20 minutes, take a 20 sec. break looking 20 feet away; TV viewing: ideally a light on behind you is best (on  the dim side) - see [employer] resource; PRL/eccentric viewing education: Try looking above the letters or image you are looking at and see if they are more clear (look off center); Consider white cane (orientation and mobility training); [facility] resource - consider a referral; spot or continuous reading with good task light (4000K preferred today): Low Vision Store resource: 5.0 readers best, but 4.0 readers fairly successful too and can hold these away from your eyes a little farther (prismatics not successful); computer adatpations: windows key and plus key to enlarge and windows key and minus key to go smaller; marking strategies/bump dots - issued 2 for buttons on computer; Toothpaste: put in your mouth and then brush or put dark background underneath for high contrast    Discharge Plan  Patient to continue home program/techniques

## 2023-05-19 ENCOUNTER — OFFICE VISIT (OUTPATIENT)
Dept: OPHTHALMOLOGY | Facility: CLINIC | Age: 63
End: 2023-05-19
Attending: OPHTHALMOLOGY
Payer: COMMERCIAL

## 2023-05-19 DIAGNOSIS — H35.051 CHOROIDAL NEOVASCULARIZATION OF RIGHT EYE DUE TO CHORIORETINITIS: ICD-10-CM

## 2023-05-19 DIAGNOSIS — H30.143 AMPIGINOUS CHOROIDITIS OF BOTH EYES: Primary | ICD-10-CM

## 2023-05-19 DIAGNOSIS — H30.91 CHOROIDAL NEOVASCULARIZATION OF RIGHT EYE DUE TO CHORIORETINITIS: ICD-10-CM

## 2023-05-19 DIAGNOSIS — H35.89 RETINAL MACULAR ATROPHY: ICD-10-CM

## 2023-05-19 DIAGNOSIS — Z79.899 HIGH RISK MEDICATION USE: ICD-10-CM

## 2023-05-19 PROCEDURE — 67028 INJECTION EYE DRUG: CPT | Mod: RT | Performed by: OPHTHALMOLOGY

## 2023-05-19 PROCEDURE — 99214 OFFICE O/P EST MOD 30 MIN: CPT | Mod: 25 | Performed by: OPHTHALMOLOGY

## 2023-05-19 PROCEDURE — 99212 OFFICE O/P EST SF 10 MIN: CPT | Mod: 25 | Performed by: OPHTHALMOLOGY

## 2023-05-19 PROCEDURE — 92134 CPTRZ OPH DX IMG PST SGM RTA: CPT | Performed by: OPHTHALMOLOGY

## 2023-05-19 PROCEDURE — 250N000011 HC RX IP 250 OP 636: Performed by: OPHTHALMOLOGY

## 2023-05-19 RX ORDER — AZATHIOPRINE 50 MG/1
TABLET ORAL
Qty: 270 TABLET | Refills: 1 | Status: SHIPPED | OUTPATIENT
Start: 2023-05-19 | End: 2023-06-23

## 2023-05-19 RX ADMIN — Medication 1.25 MG: at 10:53

## 2023-05-19 ASSESSMENT — SLIT LAMP EXAM - LIDS
COMMENTS: NORMAL
COMMENTS: NORMAL

## 2023-05-19 ASSESSMENT — TONOMETRY
OD_IOP_MMHG: 17
OS_IOP_MMHG: 18
IOP_METHOD: TONOPEN

## 2023-05-19 ASSESSMENT — REFRACTION_WEARINGRX
OS_AXIS: 020
OD_CYLINDER: +1.25
OS_CYLINDER: +1.00
SPECS_TYPE: COMPUTER
OD_SPHERE: +0.50
OD_AXIS: 005
OS_SPHERE: +1.25

## 2023-05-19 ASSESSMENT — VISUAL ACUITY
OD_CC+: -2
OD_CC: 20/30
CORRECTION_TYPE: GLASSES
METHOD: SNELLEN - LINEAR
OS_CC: 20/30

## 2023-05-19 ASSESSMENT — CONF VISUAL FIELD
OD_INFERIOR_TEMPORAL_RESTRICTION: 0
OS_SUPERIOR_TEMPORAL_RESTRICTION: 0
OS_INFERIOR_NASAL_RESTRICTION: 0
OS_SUPERIOR_NASAL_RESTRICTION: 0
OS_INFERIOR_TEMPORAL_RESTRICTION: 0
OD_INFERIOR_NASAL_RESTRICTION: 0
OD_NORMAL: 1
OD_SUPERIOR_TEMPORAL_RESTRICTION: 0
OD_SUPERIOR_NASAL_RESTRICTION: 0
OS_NORMAL: 1
METHOD: COUNTING FINGERS

## 2023-05-19 ASSESSMENT — EXTERNAL EXAM - LEFT EYE: OS_EXAM: NORMAL

## 2023-05-19 ASSESSMENT — CUP TO DISC RATIO
OD_RATIO: 0.3
OS_RATIO: 0.3

## 2023-05-19 ASSESSMENT — EXTERNAL EXAM - RIGHT EYE: OD_EXAM: NORMAL

## 2023-05-19 NOTE — PROGRESS NOTES
Chief Complaint/Presenting Concern:  Uveitis follow up    Interval History of Present Ocular Illness:  Parul Veliz is a 62 year old patient who returns for follow up of her ampiginous choroiditis. Last visit we elected to start a course of prednisone given changes left eye, no changes.    Interval Updates to Medical/Family/Social History:    1. Still working on arm/leg issues. MRI checked out fine. Lipitor now every other day to see if this will change things, but no changes.  2. Saw Rheumatology yesterday  3. Work becoming slightly more difficult    Relevant Review of Systems Updates:  Some joint pains, legs as above    Labs: 4/20/23: CMP WNL other than elevated Alk Phos. CBC WNL other than ANC.     Current eye related medications: Azathioprine 100 mg in the morning and 50 mg in the evening, Preservision twice daily, Off prednisone for one week    Retina/Uveitis Imaging:   OCT Spectralis Macula May 19, 2023  right eye:Focal atrophy, PED with slightly increased area of subretinal fluid, stable thick choroid. No PP fluid  left eye: Subretinal fibrosis, focal atrophy few cysts over area of atrophy.No PP Fluid    Assessment:     1. Ampiginous choroiditis of both eyes  Improved inflammation features in the right eye after prednisone, no new spots.     2. 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 Retinal macular atrophy - Both Eyes  Stable     4. High risk medication use  Azathioprine 150 mg total daily dose    Plan/Recommendations:      Discussed findings with patient. There is minimal inflammation after prednisone but the fluid in the retina scan of the right eye has progressed. We discussed an Avastin injection in the right eye for secondary choroidal neovascularization associated with this inflammation. We discussed that no more prednisone is needed, we should continue Azathioprine, but that Avastin injections can help reduce risk of vision  loss. Informed consent obtained for Avastin injection in the right eye today, which was performed without any complications.     Eye pressure is normal in each eye     Recommend additional testing: None needed    Continue Azathioprine at the current dose of 100 mg in the AM and 50 mg in the PM. No specific reason to increase the dose. IF the leg issues don't get better even with Lipitor modification, we might consider reducing this dose back to 50 mg twice daily.     No more prednisone needed    Continue AREDS 2 vitamins    Okay to do indoor dining without a mask    RTC 1 month tonopen, dilate, OCT, ?Avastin right eye     Physician Attestation     Attending Physician Attestation:  Complete documentation of historical and exam elements from today's encounter can be found in the full encounter summary report (not reduplicated in this progress note). I personally obtained the chief complaint(s) and history of present illness. I confirmed and edited as necessary the review of systems, past medical/surgical history, family history, social history, and examination findings as documented by others; and I examined the patient myself. I personally reviewed the relevant tests, images, and reports as documented above. I formulated and edited as necessary the assessment and plan and discussed the findings and management plan with the patient and family members present at the time of this visit.  Sp Shipman M.D., Uveitis and Medical Retina, May 19, 2023

## 2023-05-19 NOTE — NURSING NOTE
Chief Complaints and History of Present Illnesses   Patient presents with     Retinitis/choroiditis Follow Up     Chief Complaint(s) and History of Present Illness(es)     Retinitis/choroiditis Follow Up            Laterality: both eyes    Onset: gradual    Onset: months ago    Quality: States va is the same since last visit      Severity: moderate    Frequency: intermittently    Associated symptoms: photophobia and flashes (with eyes closed).  Negative for floaters    Treatments tried: no treatments    Pain scale: 0/10          Comments    Here for Ampiginous choroiditis of both eyes   AREDS 2  Janina Webster COT 9:11 AM May 19, 2023

## 2023-05-19 NOTE — PATIENT INSTRUCTIONS
Avastin in the right eye today  Continue Azathioprine at the current dose of 100 mg in the AM and 50 mg in the PM. No specific reason to increase the dose. IF the leg issues don't get better even with Lipitor modification, we might consider reducing this dose back to 50 mg twice daily.   No more prednisone needed  Continue AREDS 2 vitamins  Okay to do indoor dining without a mask

## 2023-06-23 ENCOUNTER — OFFICE VISIT (OUTPATIENT)
Dept: OPHTHALMOLOGY | Facility: CLINIC | Age: 63
End: 2023-06-23
Attending: OPHTHALMOLOGY
Payer: COMMERCIAL

## 2023-06-23 DIAGNOSIS — H35.89 RETINAL MACULAR ATROPHY: ICD-10-CM

## 2023-06-23 DIAGNOSIS — H30.143 AMPIGINOUS CHOROIDITIS OF BOTH EYES: Primary | ICD-10-CM

## 2023-06-23 DIAGNOSIS — Z79.899 HIGH RISK MEDICATION USE: ICD-10-CM

## 2023-06-23 DIAGNOSIS — H35.051 CHOROIDAL NEOVASCULARIZATION OF RIGHT EYE DUE TO CHORIORETINITIS: ICD-10-CM

## 2023-06-23 DIAGNOSIS — H30.91 CHOROIDAL NEOVASCULARIZATION OF RIGHT EYE DUE TO CHORIORETINITIS: ICD-10-CM

## 2023-06-23 PROCEDURE — 67028 INJECTION EYE DRUG: CPT | Mod: RT | Performed by: OPHTHALMOLOGY

## 2023-06-23 PROCEDURE — 99213 OFFICE O/P EST LOW 20 MIN: CPT | Mod: 25 | Performed by: OPHTHALMOLOGY

## 2023-06-23 PROCEDURE — 250N000011 HC RX IP 250 OP 636: Performed by: OPHTHALMOLOGY

## 2023-06-23 PROCEDURE — 92134 CPTRZ OPH DX IMG PST SGM RTA: CPT | Performed by: OPHTHALMOLOGY

## 2023-06-23 RX ORDER — AZATHIOPRINE 50 MG/1
TABLET ORAL
Qty: 270 TABLET | Refills: 1 | Status: SHIPPED | OUTPATIENT
Start: 2023-06-23 | End: 2023-08-11

## 2023-06-23 RX ADMIN — Medication 1.25 MG: at 09:54

## 2023-06-23 ASSESSMENT — CONF VISUAL FIELD
OS_INFERIOR_TEMPORAL_RESTRICTION: 0
OS_SUPERIOR_NASAL_RESTRICTION: 0
OS_NORMAL: 1
OD_SUPERIOR_NASAL_RESTRICTION: 0
OS_INFERIOR_NASAL_RESTRICTION: 0
OS_SUPERIOR_TEMPORAL_RESTRICTION: 0
OD_NORMAL: 1
METHOD: COUNTING FINGERS
OD_INFERIOR_NASAL_RESTRICTION: 0
OD_SUPERIOR_TEMPORAL_RESTRICTION: 0
OD_INFERIOR_TEMPORAL_RESTRICTION: 0

## 2023-06-23 ASSESSMENT — REFRACTION_WEARINGRX
OD_AXIS: 005
OD_CYLINDER: +1.25
SPECS_TYPE: COMPUTER
OD_SPHERE: +0.50
OS_SPHERE: +1.25
OS_CYLINDER: +1.00
OS_AXIS: 020

## 2023-06-23 ASSESSMENT — TONOMETRY
OD_IOP_MMHG: 15
IOP_METHOD: TONOPEN
OS_IOP_MMHG: 17

## 2023-06-23 ASSESSMENT — VISUAL ACUITY
OS_CC: 20/25
OD_SC+: +1
OD_SC: 20/25
METHOD: SNELLEN - LINEAR
OS_CC+: -1+1
CORRECTION_TYPE: GLASSES

## 2023-06-23 ASSESSMENT — CUP TO DISC RATIO
OS_RATIO: 0.3
OD_RATIO: 0.3

## 2023-06-23 ASSESSMENT — EXTERNAL EXAM - LEFT EYE: OS_EXAM: NORMAL

## 2023-06-23 ASSESSMENT — SLIT LAMP EXAM - LIDS
COMMENTS: NORMAL
COMMENTS: NORMAL

## 2023-06-23 ASSESSMENT — EXTERNAL EXAM - RIGHT EYE: OD_EXAM: NORMAL

## 2023-06-23 NOTE — PROGRESS NOTES
"Chief Complaint/Presenting Concern:  Uveitis follow up    Interval History of Present Ocular Illness:  Parul Veliz is a 63 year old patient who returns for follow up of her ampiginous choroiditis. Last visit on 5/19/23 there was minimal inflammation after prednisone but the fluid in the retina scan of the right eye had progressed, so we proceeded with a right eye Avastin injection. We continued the dose of Azathioprine.     Today, the right eye feels like the \"gray area has gotten bigger\" that she cannot see through. No other changes. No pain. Continues to have photophobia. The floater in the left eye is stable. No flashes.      Interval Updates to Medical/Family/Social History:    1. Recent rheumatology labs were negative, likely not a neuromuscular issue. Will have another MRI of the spine soon. Leg pain has not changed with Lipitor every other day so it is being continued  2. Things are well at work.    Relevant Review of Systems Updates:  Continues to have joint leg pains without change.     Labs: 5/18/23: Normal CCP, RF, and myomarker 3 plus profile.      Current eye related medications: Azathioprine 100 mg in the morning and 50 mg in the evening (sometimes forgets evening dose), Preservision twice daily (sometimes forgets evening dose), no prednisone    Retina/Uveitis Imaging:   OCT Spectralis Macula 06/23/23  right eye: Focal atrophy, PED with interval resolution of subretinal fluid, stable thick choroid. No PP fluid.   left eye: Subretinal fibrosis, focal atrophy few cysts over area of atrophy are improved.No PP Fluid. Stable.     Assessment:     1. Ampiginous choroiditis of both eyes  Improved inflammation features in the right eye after prednisone, no new spots.     2. 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     3. Retinal macular atrophy - Both Eyes  Stable     4. High risk medication use  Azathioprine 150 " mg total daily dose    Plan/Recommendations:      Discussed findings with patient and her Father. The right eye subretinal fluid resolved after Avastin injection last visit. There continues to be minimal inflammation. We discussed continued Azathioprine but an Avastin injection verus observation. We elected to do another Avastin injection today in the right eye to prevent recurrence of the fluid in the retina and extend interval.     Eye pressure is normal in each eye     Recommend additional testing: None needed    Continue Azathioprine at the current dose of 100 mg in the AM and 50 mg in the PM. No specific reason to increase the dose.     No prednisone needed    Continue AREDS 2 vitamins    RTC 6-7 weeks tonopen, dilate, OCT, ?Avastin right eye     Darell Kay MD  Ophthalmology, PGY-3    Attending Physician Attestation:  Complete documentation of historical and exam elements from today's encounter can be found in the full encounter summary report (not reduplicated in this progress note). I reviewed the chief complaint(s) and history of present illness, and  confirmed and edited as necessary the review of systems, past medical/surgical history, family history, social history, and examination findings as documented by others and the treating Resident or Fellow Physician.    I examined the patient myself, discussed the findings, reviewed all ancillary testing data and modified these results and reports along with the assessment and plan with the Treating Resident or Fellow Physician. I agree with the note as detailed above.   Sp Shipman M.D.  Uveitis and Medical Retina  June 23, 2023

## 2023-06-23 NOTE — NURSING NOTE
Chief Complaints and History of Present Illnesses   Patient presents with     Retinitis/choroiditis Follow Up     Chief Complaint(s) and History of Present Illness(es)     Retinitis/choroiditis Follow Up            Laterality: both eyes    Onset: gradual    Onset: months ago    Quality: Feels the left eye has decreased      Severity: moderate    Frequency: intermittently    Associated symptoms: photophobia (not new) and floaters (in the OS).  Negative for flashes    Treatments tried: artificial tears    Pain scale: 0/10          Comments    Here for Ampiginous choroiditis of both eyes   AT prn  Azathioprine   AREDS 2   The gray spot in the right eye has gotten larger  Janina Webster COT 8:16 AM June 23, 2023

## 2023-06-23 NOTE — PATIENT INSTRUCTIONS
Continue Azathioprine at the current dose of 100 mg in the AM and 50 mg in the PM. No specific reason to increase the dose.   No prednisone needed  Continue AREDS 2 vitamins

## 2023-06-23 NOTE — LETTER
"6/23/2023       RE: Parul Veliz  [address] [address]     Dear Colleague,    Thank you for referring your patient, Parul Veliz, to the [facility] [place] at [facility]. Please see a copy of my visit note below.    Chief Complaint/Presenting Concern:  Uveitis follow up.    Interval History of Present Ocular Illness:  Parul Veliz is a 63 year old patient who returns for follow up of her ampiginous choroiditis. Last visit on 5/19/23 there was minimal inflammation after prednisone but the fluid in the retina scan of the right eye had progressed, so we proceeded with a right eye Avastin injection. We continued the dose of Azathioprine.     Today, the right eye feels like the \"gray area has gotten bigger\" that she cannot see through. No other changes. No pain. Continues to have photophobia. The floater in the left eye is stable. No flashes.      Interval Updates to Medical/Family/Social History:    1. Recent rheumatology labs were negative, likely not a neuromuscular issue. Will have another MRI of the spine soon. Leg pain has not changed with Lipitor every other day so it is being continued.  2. Things are well at work.    Relevant Review of Systems Updates:  Continues to have joint leg pains without change.     Labs: 5/18/23: Normal CCP, RF, and myomarker 3 plus profile.      Current eye related medications: Azathioprine 100 mg in the morning and 50 mg in the evening (sometimes forgets evening dose), Preservision twice daily (sometimes forgets evening dose), no prednisone.    Retina/Uveitis Imaging:   OCT Spectralis Macula 06/23/23  right eye: Focal atrophy, PED with interval resolution of subretinal fluid, stable thick choroid. No PP fluid.   left eye: Subretinal fibrosis, focal atrophy few cysts over area of atrophy are improved.No PP Fluid. Stable.     Assessment:     1. Ampiginous choroiditis of both eyes  Improved " inflammation features in the right eye after prednisone, no new spots.     2. 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     3. Retinal macular atrophy - Both Eyes  Stable.     4. High risk medication use  Azathioprine 150 mg total daily dose.    Plan/Recommendations:    Discussed findings with patient and her Father. The right eye subretinal fluid resolved after Avastin injection last visit. There continues to be minimal inflammation. We discussed continued Azathioprine but an Avastin injection verus observation. We elected to do another Avastin injection today in the right eye to prevent recurrence of the fluid in the retina and extend interval.   Eye pressure is normal in each eye.   Recommend additional testing: None needed.  Continue Azathioprine at the current dose of 100 mg in the AM and 50 mg in the PM. No specific reason to increase the dose.   No prednisone needed.  Continue AREDS 2 vitamins.    RTC 6-7 weeks tonopen, dilate, OCT, ?Avastin right eye     Darell Kay MD  Ophthalmology, PGY-3    Attending Physician Attestation:  Complete documentation of historical and exam elements from today's encounter can be found in the full encounter summary report (not reduplicated in this progress note). I reviewed the chief complaint(s) and history of present illness, and  confirmed and edited as necessary the review of systems, past medical/surgical history, family history, social history, and examination findings as documented by others and the treating Resident or Fellow Physician.    I examined the patient myself, discussed the findings, reviewed all ancillary testing data and modified these results and reports along with the assessment and plan with the Treating Resident or Fellow Physician. I agree with the note as detailed above.   Sp Shipman M.D.  Uveitis and Medical Retina  June 23, 2023      Again, thank you  for allowing me to participate in the care of your patient.      Sincerely,    Sp Shipman MD  [facility] Dept of Ophthalmology  Uveitis and Medical Retina

## 2023-08-11 ENCOUNTER — OFFICE VISIT (OUTPATIENT)
Dept: OPHTHALMOLOGY | Facility: CLINIC | Age: 63
End: 2023-08-11
Attending: OPHTHALMOLOGY
Payer: COMMERCIAL

## 2023-08-11 DIAGNOSIS — H30.91 CHOROIDAL NEOVASCULARIZATION OF RIGHT EYE DUE TO CHORIORETINITIS: ICD-10-CM

## 2023-08-11 DIAGNOSIS — Z79.899 HIGH RISK MEDICATION USE: ICD-10-CM

## 2023-08-11 DIAGNOSIS — H35.89 RETINAL MACULAR ATROPHY: ICD-10-CM

## 2023-08-11 DIAGNOSIS — H30.143 AMPIGINOUS CHOROIDITIS OF BOTH EYES: Primary | ICD-10-CM

## 2023-08-11 DIAGNOSIS — H35.051 CHOROIDAL NEOVASCULARIZATION OF RIGHT EYE DUE TO CHORIORETINITIS: ICD-10-CM

## 2023-08-11 PROCEDURE — 92134 CPTRZ OPH DX IMG PST SGM RTA: CPT | Performed by: OPHTHALMOLOGY

## 2023-08-11 PROCEDURE — 99213 OFFICE O/P EST LOW 20 MIN: CPT | Mod: GC | Performed by: OPHTHALMOLOGY

## 2023-08-11 PROCEDURE — 92134 CPTRZ OPH DX IMG PST SGM RTA: CPT | Mod: 26 | Performed by: OPHTHALMOLOGY

## 2023-08-11 PROCEDURE — 99213 OFFICE O/P EST LOW 20 MIN: CPT | Performed by: OPHTHALMOLOGY

## 2023-08-11 RX ORDER — AZATHIOPRINE 50 MG/1
TABLET ORAL
Qty: 270 TABLET | Refills: 2 | Status: SHIPPED | OUTPATIENT
Start: 2023-08-11 | End: 2024-02-09

## 2023-08-11 ASSESSMENT — TONOMETRY
IOP_METHOD: TONOPEN
OS_IOP_MMHG: 15
OD_IOP_MMHG: 16

## 2023-08-11 ASSESSMENT — CONF VISUAL FIELD
OD_INFERIOR_TEMPORAL_RESTRICTION: 0
OS_INFERIOR_TEMPORAL_RESTRICTION: 0
OD_SUPERIOR_NASAL_RESTRICTION: 0
OS_INFERIOR_NASAL_RESTRICTION: 0
OS_SUPERIOR_TEMPORAL_RESTRICTION: 0
OS_SUPERIOR_NASAL_RESTRICTION: 0
OD_SUPERIOR_TEMPORAL_RESTRICTION: 0
OD_INFERIOR_NASAL_RESTRICTION: 0
OS_NORMAL: 1
METHOD: COUNTING FINGERS
OD_NORMAL: 1

## 2023-08-11 ASSESSMENT — CUP TO DISC RATIO
OD_RATIO: 0.3
OS_RATIO: 0.3

## 2023-08-11 ASSESSMENT — REFRACTION_WEARINGRX
SPECS_TYPE: COMPUTER
OS_CYLINDER: +1.00
OD_CYLINDER: +1.25
OD_AXIS: 005
OS_AXIS: 020
OD_SPHERE: +0.50
OS_SPHERE: +1.25

## 2023-08-11 ASSESSMENT — VISUAL ACUITY
METHOD: SNELLEN - LINEAR
CORRECTION_TYPE: GLASSES
OD_SC+: -2
OS_CC: 20/40
OD_SC: 20/25

## 2023-08-11 ASSESSMENT — EXTERNAL EXAM - LEFT EYE: OS_EXAM: NORMAL

## 2023-08-11 ASSESSMENT — EXTERNAL EXAM - RIGHT EYE: OD_EXAM: NORMAL

## 2023-08-11 ASSESSMENT — SLIT LAMP EXAM - LIDS
COMMENTS: NORMAL
COMMENTS: NORMAL

## 2023-08-11 NOTE — LETTER
8/11/2023       RE: Parul Veliz  [address] [address]     Dear Colleague,    Thank you for referring your patient, Parul Veliz, to the [facility] [place] at [facility]. Please see a copy of my visit note below.    Chief Complaint/Presenting Concern:  Uveitis follow up    Interval History of Present Ocular Illness:  Parul Veliz is a 63 year old patient who returns for follow up of ampiginous choroidits. Last visit was 6/23/23. During that visit her subretinal fluid had resolved and she received an avastin injection in the right eye.     Today she reports no new visual concerns. No flashing lights, new floaters, double vision, blurry vision in the right eye. The left eye has some areas where things seem slightly blurrier.     Interval Updates to Medical/Family/Social History:    Saw  physician for physical 8/9/23, no changes to her chronic conditions  2.  Will get another MRI of parts of the spine    Relevant Review of Systems Updates:  Continues with leg pain even with cholesterol medications every other day.     Laboratory Testing 8/9/23:  CMP WNL other than elevated glucose 104 and alk phos 159 (slightly elevated but improved)  Normal/negative: hgba1c, lipid panel     Current eye related medications: Azathioprine 100mg in the AM and 50mg in the PM, AREDS 2 vitamin, rare Refresh drops          Retina/Uveitis Imaging:   OCT Spectralis Macula August 11, 2023  right eye: Vitreous clear, shallow foveal contour, superior parafoveal geographic atrophy, some small drusenoid changes, focal areas of attenuation of EZ, IN area of outer retinal atrophy with overlying trace cystic changes stable,. No subretinal fluid. Normal choroidal thickness. No NFL Thickening, no PP fluid  left eye: Vitreous clear, shallow foveal contour, inferior parafoveal geographic atrophy with overlying trace cystic changes, some small drusen, focal  areas of attenuation of EZ, normal choroidal thickness. No NFL Thickening, no PP fluid    Assessment:     1. Ampiginous choroiditis of both eyes  No haze and no new lesions on OCT    2. 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 Retinal macular atrophy  Stable on exam     4. High risk medication use  Azathioprine 150 mg daily    Plan/Recommendations:    Discussed findings with patient. There is no active inflammation on exam today. There is no recurrence of the CNV in the right eye and none left eye.  The focal cystoid changes on scans are likely related to slow progression of atrophy. Given good recovery of the right eye after last Avastin injection without fluid, we can continue the same dose of Azathioprine and hold off on Avastin injection today.   Eye pressure is 16/15.We can continue to monitor without drops for pressure lowering  Recommend additional testing:CBC to check blood counts to ensure things are well on azathioprine  Continue these medications: Azathioprine at the current dose of 100 mg in the AM and 50 mg in the PM.     RTC 2nd week October tonopen, dilate, OCT, Photos (ordered)    Thank you for entrusting us with your care  Yisel Rashid MD, PGY3  Ophthalmology Resident  [facility]    Attending Physician Attestation:  Complete documentation of historical and exam elements from today's encounter can be found in the full encounter summary report (not reduplicated in this progress note). I personally obtained the chief complaint(s) and history of present illness. I confirmed and edited as necessary the review of systems, past medical/surgical history, family history, social history, and examination findings as documented by others; and I examined the patient myself. I personally reviewed the relevant tests, images, and reports as documented above. I formulated and edited as necessary the assessment and  plan and discussed the findings and management plan with the patient and family.  Sp Shipman MD.      Again, thank you for allowing me to participate in the care of your patient.      Sincerely,    Sp Shipman MD  [facility] Dept of Ophthalmology  Uveitis and Medical Retina

## 2023-08-11 NOTE — PATIENT INSTRUCTIONS
Recommend additional testing:CBC to check blood counts to ensure things are well on azathioprine  Continue these medications: Azathioprine at the current dose of 100 mg in the AM and 50 mg in the PM.

## 2023-08-11 NOTE — NURSING NOTE
Chief Complaints and History of Present Illnesses   Patient presents with    Retinitis/choroiditis Follow Up     Chief Complaint(s) and History of Present Illness(es)       Retinitis/choroiditis Follow Up              Laterality: both eyes    Onset: gradual    Onset: months ago    Quality: States va is the same since last visit      Severity: moderate    Frequency: intermittently    Associated symptoms: photophobia.  Negative for flashes and floaters    Pain scale: 0/10              Comments    Here for Ampiginous choroiditis of both eyes  AT prn  Azathioprine  AREDS 2  Janina Webster COT 8:45 AM August 11, 2023

## 2023-08-11 NOTE — PROGRESS NOTES
Chief Complaint/Presenting Concern:  Uveitis follow up    Interval History of Present Ocular Illness:  Parul Veliz is a 63 year old patient who returns for follow up of ampiginous choroidits. Last visit was 6/23/23. During that visit her subretinal fluid had resolved and she received an avastin injection in the right eye.     Today she reports no new visual concerns. No flashing lights, new floaters, double vision, blurry vision in the right eye. The left eye has some areas where things seem slightly blurrier.     Interval Updates to Medical/Family/Social History:    Saw IM physician for physical 8/9/23, no changes to her chronic conditions  2.  Will get another MRI of parts of the spine    Relevant Review of Systems Updates:  Continues with leg pain even with cholesterol medications every other day.     Laboratory Testing 8/9/23:  CMP WNL other than elevated glucose 104 and alk phos 159 (slightly elevated but improved)  Normal/negative: hgba1c, lipid panel     Current eye related medications: Azathioprine 100mg in the AM and 50mg in the PM, AREDS 2 vitamin, rare Refresh drops    Retina/Uveitis Imaging:   OCT Spectralis Macula August 11, 2023  right eye: Vitreous clear, shallow foveal contour, superior parafoveal geographic atrophy, some small drusenoid changes, focal areas of attenuation of EZ, IN area of outer retinal atrophy with overlying trace cystic changes stable,. No subretinal fluid. Normal choroidal thickness. No NFL Thickening, no PP fluid  left eye: Vitreous clear, shallow foveal contour, inferior parafoveal geographic atrophy with overlying trace cystic changes, some small drusen, focal areas of attenuation of EZ, normal choroidal thickness. No NFL Thickening, no PP fluid    Assessment:     1. Ampiginous choroiditis of both eyes  No haze and no new lesions on OCT    2. 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  Retinal macular atrophy  Stable on exam     4. High risk medication use  Azathioprine 150 mg daily    Plan/Recommendations:    Discussed findings with patient. There is no active inflammation on exam today. There is no recurrence of the CNV in the right eye and none left eye.  The focal cystoid changes on scans are likely related to slow progression of atrophy. Given good recovery of the right eye after last Avastin injection without fluid, we can continue the same dose of Azathioprine and hold off on Avastin injection today.   Eye pressure is 16/15.We can continue to monitor without drops for pressure lowering  Recommend additional testing:CBC to check blood counts to ensure things are well on azathioprine  Continue these medications: Azathioprine at the current dose of 100 mg in the AM and 50 mg in the PM.     RTC 2nd week October tonopen, dilate, OCT, Photos (ordered)    Thank you for entrusting us with your care  Yisel Rashid MD, PGY3  Ophthalmology Resident  [facility]    Attending Physician Attestation:  Complete documentation of historical and exam elements from today's encounter can be found in the full encounter summary report (not reduplicated in this progress note). I reviewed the chief complaint(s) and history of present illness, and  confirmed and edited as necessary the review of systems, past medical/surgical history, family history, social history, and examination findings as documented by others and the treating Resident or Fellow Physician.    I examined the patient myself, discussed the findings, reviewed all ancillary testing data (if any completed) and modified these results and reports (if any interpreted) along with the assessment and plan with the Treating Resident or Fellow Physician. I agree with the note as detailed above.   Sp Shipman M.D.  Uveitis and Medical Retina  August 11, 2023

## 2023-11-03 ENCOUNTER — OFFICE VISIT (OUTPATIENT)
Dept: OPHTHALMOLOGY | Facility: CLINIC | Age: 63
End: 2023-11-03
Attending: OPHTHALMOLOGY
Payer: COMMERCIAL

## 2023-11-03 DIAGNOSIS — H30.143 AMPIGINOUS CHOROIDITIS OF BOTH EYES: Primary | ICD-10-CM

## 2023-11-03 DIAGNOSIS — Z79.899 HIGH RISK MEDICATION USE: ICD-10-CM

## 2023-11-03 DIAGNOSIS — H35.051 CHOROIDAL NEOVASCULARIZATION OF RIGHT EYE DUE TO CHORIORETINITIS: ICD-10-CM

## 2023-11-03 DIAGNOSIS — H35.89 RETINAL MACULAR ATROPHY: ICD-10-CM

## 2023-11-03 DIAGNOSIS — H30.91 CHOROIDAL NEOVASCULARIZATION OF RIGHT EYE DUE TO CHORIORETINITIS: ICD-10-CM

## 2023-11-03 PROCEDURE — 92250 FUNDUS PHOTOGRAPHY W/I&R: CPT | Performed by: OPHTHALMOLOGY

## 2023-11-03 PROCEDURE — 99207 FUNDUS PHOTOS OU (BOTH EYES): CPT | Mod: 26 | Performed by: OPHTHALMOLOGY

## 2023-11-03 PROCEDURE — 99213 OFFICE O/P EST LOW 20 MIN: CPT | Performed by: OPHTHALMOLOGY

## 2023-11-03 PROCEDURE — 92134 CPTRZ OPH DX IMG PST SGM RTA: CPT | Performed by: OPHTHALMOLOGY

## 2023-11-03 PROCEDURE — 99214 OFFICE O/P EST MOD 30 MIN: CPT | Performed by: OPHTHALMOLOGY

## 2023-11-03 ASSESSMENT — EXTERNAL EXAM - LEFT EYE: OS_EXAM: NORMAL

## 2023-11-03 ASSESSMENT — TONOMETRY
OS_IOP_MMHG: 20
IOP_METHOD: TONOPEN
OD_IOP_MMHG: 17

## 2023-11-03 ASSESSMENT — CONF VISUAL FIELD
OD_SUPERIOR_TEMPORAL_RESTRICTION: 0
OS_NORMAL: 1
OS_INFERIOR_NASAL_RESTRICTION: 0
METHOD: COUNTING FINGERS
OS_INFERIOR_TEMPORAL_RESTRICTION: 0
OS_SUPERIOR_TEMPORAL_RESTRICTION: 0
OS_SUPERIOR_NASAL_RESTRICTION: 0
OD_SUPERIOR_NASAL_RESTRICTION: 0
OD_INFERIOR_NASAL_RESTRICTION: 0
OD_NORMAL: 1
OD_INFERIOR_TEMPORAL_RESTRICTION: 0

## 2023-11-03 ASSESSMENT — REFRACTION_WEARINGRX
OD_SPHERE: +0.50
SPECS_TYPE: COMPUTER
OD_AXIS: 005
OS_SPHERE: +1.25
OS_AXIS: 020
OD_CYLINDER: +1.25
OS_CYLINDER: +1.00

## 2023-11-03 ASSESSMENT — SLIT LAMP EXAM - LIDS
COMMENTS: NORMAL
COMMENTS: NORMAL

## 2023-11-03 ASSESSMENT — VISUAL ACUITY
OS_PH_CC+: -1
OD_CC+: -1
OS_CC: 20/40
CORRECTION_TYPE: GLASSES
OS_PH_CC: 20/30
METHOD: SNELLEN - LINEAR
OD_CC: 20/25

## 2023-11-03 ASSESSMENT — EXTERNAL EXAM - RIGHT EYE: OD_EXAM: NORMAL

## 2023-11-03 ASSESSMENT — CUP TO DISC RATIO
OD_RATIO: 0.3
OS_RATIO: 0.3

## 2023-11-03 NOTE — PROGRESS NOTES
Chief Complaint/Presenting Concern:  Uveitis follow up    Interval History of Present Ocular Illness:  Parul Veliz is a 63 year old patient who returns for follow up of ampiginous choroidits. Last visit was 08/11/23, at which time there was no active inflammation or CNV. We continued the azathioprine at the previous dose and requested that she obtain labs.    Parul states since the last visit, she feels that her light sensitivity feels more significant on the computer. She is unsure if it is one eye or both eyes. Patient states she has not had any major changes in vision; she denies any flashes of light, floaters, eye pain, or eye redness. She states she was unable to get the CBC testing. She is taking azathioprine and AREDS 2 as prescribed, without any complications.    Interval Updates to Medical/Family/Social History:    No new updates. Work may change slightly.     Relevant Review of Systems Updates:  Fatigue is tolerable    Laboratory Testing: No new testing     Current eye related medications: Azathioprine 100mg in the AM and 50mg in the PM, AREDS 2 vitamin, infrequent artificial tears    Retina/Uveitis Imaging:   OCT Spectralis Macula November 3, 2023:  Right eye: clear media, PED remains inactive, no subretinal fluid. Few inner retinal cystoid changes over atrophy which are stable. No NFL thickening, no PP fluid  Left eye: clear media, few degenerative changes over areas of retinal atrophy with irregularity of RPE and EZ, stable since last visit, no new fluid. NO NFL thickening, no pp Fluid    Optos Photos and Autofluorescence November 3, 2023  Right eye: Stable pigmentary changes in macula, temporal atrophic spots. Slightly larger area of hypo-FAF in macula, no new hyper-FAF spots  Left eye: Stable pigmentary changes in macula,Slightly larger area of hypo-FAF in macula, no new hyper-FAF spots    Assessment:     1. Ampiginous choroiditis of both eyes  No new spots in either eye, no active  inflammation     2. 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 Retinal macular atrophy  Slightly larger on autofluorescence in each eye     4. High risk medication use  Azathioprine 150 mg daily    Plan/Recommendations:    Discussed findings with patient and her Father. The eyes are doing well without active inflammation nor new spots in either eye. The CNV in the right eye remains inactive now 4 months after the last Avastin injection. We can continue monitoring without an Avastin injection and without adjustments in Azathioprine.    Eye pressure is 17/20. We can continue to monitor without drops for pressure lowering  Recommend additional testing: CBC and CMP for routine monitoring.  Continue these medications: Azathioprine at the current dose of 100 mg in the AM and 50 mg in the PM   No prednisone pills, no Avastin injection for either eye    RTC 3 months tonopen, dilate, OCT, Photos (ordered)    Immanuel Francis, MS4  [facility]     Attending Physician Attestation:  Complete documentation of historical and exam elements from today's encounter can be found in the full encounter summary report (not reduplicated in this progress note). I reviewed the chief complaint(s) and history of present illness, and  confirmed and edited as necessary the review of systems, past medical/surgical history, family history, social history, and examination findings as documented by others and the Medical student.    I examined the patient myself, discussed the findings, reviewed all ancillary testing data (if any completed) and modified these results and reports (if any interpreted) along with the assessment and plan with the Treating Resident or Fellow Physician. I agree with the note as detailed above.   Sp Shipman M.D.  Uveitis and Medical Retina  November 3, 2023

## 2023-11-03 NOTE — LETTER
11/3/2023       RE: Parul Veliz  [address] [facility] [address]     Dear Colleague,    Thank you for referring your patient, Parul Veliz, to the [facility] [place] at [facility]. Please see a copy of my visit note below.    Chief Complaint/Presenting Concern:  Uveitis follow up.    Interval History of Present Ocular Illness:  Parul Veliz is a 63 year old patient who returns for follow up of ampiginous choroidits. Last visit was 08/11/23, at which time there was no active inflammation or CNV. We continued the azathioprine at the previous dose and requested that she obtain labs.    Parul states since the last visit, she feels that her light sensitivity feels more significant on the computer. She is unsure if it is one eye or both eyes. Patient states she has not had any major changes in vision; she denies any flashes of light, floaters, eye pain, or eye redness. She states she was unable to get the CBC testing. She is taking azathioprine and AREDS 2 as prescribed, without any complications.    Interval Updates to Medical/Family/Social History:    No new updates. Work may change slightly.     Relevant Review of Systems Updates:  Fatigue is tolerable.    Laboratory Testing: No new testing.     Current eye related medications: Azathioprine 100mg in the AM and 50mg in the PM, AREDS 2 vitamin, infrequent artificial tears.    Retina/Uveitis Imaging:   OCT Spectralis Macula November 3, 2023:  Right eye: clear media, PED remains inactive, no subretinal fluid. Few inner retinal cystoid changes over atrophy which are stable. No NFL thickening, no PP fluid  Left eye: clear media, few degenerative changes over areas of retinal atrophy with irregularity of RPE and EZ, stable since last visit, no new fluid. NO NFL thickening, no pp Fluid    Optos Photos and Autofluorescence November 3, 2023  Right eye: Stable pigmentary changes in  macula, temporal atrophic spots. Slightly larger area of hypo-FAF in macula, no new hyper-FAF spots  Left eye: Stable pigmentary changes in macula,Slightly larger area of hypo-FAF in macula, no new hyper-FAF spots    Assessment:     1. Ampiginous choroiditis of both eyes  No new spots in either eye, no active inflammation.     2. 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    3. Retinal macular atrophy  Slightly larger on autofluorescence in each eye.     4. High risk medication use  Azathioprine 150 mg daily.    Plan/Recommendations:    Discussed findings with patient and her Father. The eyes are doing well without active inflammation nor new spots in either eye. The CNV in the right eye remains inactive now 4 months after the last Avastin injection. We can continue monitoring without an Avastin injection and without adjustments in Azathioprine.    Eye pressure is 17/20. We can continue to monitor without drops for pressure lowering.  Recommend additional testing: CBC and CMP for routine monitoring.  Continue these medications: Azathioprine at the current dose of 100 mg in the AM and 50 mg in the PM.   No prednisone pills, no Avastin injection for either eye.    RTC 3 months tonopen, dilate, OCT, Photos (ordered)    Immanuel Francis, MS4  [facility]     Attending Physician Attestation:  Complete documentation of historical and exam elements from today's encounter can be found in the full encounter summary report (not reduplicated in this progress note). I reviewed the chief complaint(s) and history of present illness, and  confirmed and edited as necessary the review of systems, past medical/surgical history, family history, social history, and examination findings as documented by others and the Medical student.    I examined the patient myself, discussed the findings, reviewed all ancillary testing data (if any completed) and modified these results and  reports (if any interpreted) along with the assessment and plan with the Treating Resident or Fellow Physician. I agree with the note as detailed above.   Sp Shipman M.D.  Uveitis and Medical Retina  November 3, 2023    Again, thank you for allowing me to participate in the care of your patient.      Sincerely,    Sp Shipman MD  [facility] Dept of Ophthalmology  Uveitis and Medical Retina

## 2023-11-03 NOTE — PATIENT INSTRUCTIONS
Recommend additional testing: CBC and CMP for routine monitoring.  Continue these medications: Azathioprine at the current dose of 100 mg in the AM and 50 mg in the PM   No prednisone pills, no Avastin injection for either eye

## 2023-11-03 NOTE — NURSING NOTE
Chief Complaints and History of Present Illnesses   Patient presents with    Retinitis/choroiditis Follow Up     Chief Complaint(s) and History of Present Illness(es)       Retinitis/choroiditis Follow Up              Laterality: both eyes    Onset: gradual    Onset: months ago    Quality: States va has decreased      Severity: moderate    Frequency: intermittently    Associated symptoms: photophobia (at the computer but is all the time).  Negative for flashes and floaters    Treatments tried: no treatments    Pain scale: 0/10              Comments    Here for Ampiginous choroiditis of both eyes  Azathioprine   Janina Webster COT 9:13 AM November 3, 2023

## 2023-11-25 ENCOUNTER — HEALTH MAINTENANCE LETTER (OUTPATIENT)
Age: 63
End: 2023-11-25

## 2023-12-27 ENCOUNTER — TELEPHONE (OUTPATIENT)
Dept: OPHTHALMOLOGY | Facility: CLINIC | Age: 63
End: 2023-12-27
Payer: COMMERCIAL

## 2023-12-27 NOTE — TELEPHONE ENCOUNTER
[facility] Call Center    Phone Message    May a detailed message be left on voicemail: yes     Reason for Call: Other: Patient is COVID positive and would like a call back from provider and care team regarding her medications, the immunosuppressant medications.     Please call patient back at [phone]. Thank you.    Action Taken: Message routed to:  [facility] ([facility] Eye    Travel Screening: Not Applicable

## 2024-02-09 ENCOUNTER — OFFICE VISIT (OUTPATIENT)
Dept: OPHTHALMOLOGY | Facility: CLINIC | Age: 64
End: 2024-02-09
Attending: OPHTHALMOLOGY
Payer: COMMERCIAL

## 2024-02-09 DIAGNOSIS — H35.051 CHOROIDAL NEOVASCULARIZATION OF RIGHT EYE DUE TO CHORIORETINITIS: ICD-10-CM

## 2024-02-09 DIAGNOSIS — Z79.899 HIGH RISK MEDICATION USE: ICD-10-CM

## 2024-02-09 DIAGNOSIS — H30.91 CHOROIDAL NEOVASCULARIZATION OF RIGHT EYE DUE TO CHORIORETINITIS: ICD-10-CM

## 2024-02-09 DIAGNOSIS — H30.143 AMPIGINOUS CHOROIDITIS OF BOTH EYES: Primary | ICD-10-CM

## 2024-02-09 DIAGNOSIS — H35.89 RETINAL MACULAR ATROPHY: ICD-10-CM

## 2024-02-09 PROCEDURE — 92134 CPTRZ OPH DX IMG PST SGM RTA: CPT | Performed by: OPHTHALMOLOGY

## 2024-02-09 PROCEDURE — 99213 OFFICE O/P EST LOW 20 MIN: CPT | Performed by: OPHTHALMOLOGY

## 2024-02-09 PROCEDURE — 92250 FUNDUS PHOTOGRAPHY W/I&R: CPT | Performed by: OPHTHALMOLOGY

## 2024-02-09 PROCEDURE — 99207 FUNDUS PHOTOS OU (BOTH EYES): CPT | Mod: 26 | Performed by: OPHTHALMOLOGY

## 2024-02-09 PROCEDURE — 99214 OFFICE O/P EST MOD 30 MIN: CPT | Mod: GC | Performed by: OPHTHALMOLOGY

## 2024-02-09 RX ORDER — HYDROCORTISONE 25 MG/G
OINTMENT TOPICAL
COMMUNITY
Start: 2024-02-02

## 2024-02-09 RX ORDER — AZATHIOPRINE 50 MG/1
TABLET ORAL
Qty: 270 TABLET | Refills: 1 | Status: SHIPPED | OUTPATIENT
Start: 2024-02-09 | End: 2024-06-04

## 2024-02-09 RX ORDER — CLOBETASOL PROPIONATE 0.5 MG/ML
SOLUTION TOPICAL
COMMUNITY
Start: 2024-02-02

## 2024-02-09 ASSESSMENT — REFRACTION_WEARINGRX
OD_AXIS: 005
OS_CYLINDER: +1.00
SPECS_TYPE: COMPUTER
OD_CYLINDER: +1.25
OS_SPHERE: +1.25
OS_AXIS: 020
OD_SPHERE: +0.50

## 2024-02-09 ASSESSMENT — CONF VISUAL FIELD
OS_NORMAL: 1
OS_INFERIOR_NASAL_RESTRICTION: 0
OD_INFERIOR_NASAL_RESTRICTION: 0
METHOD: COUNTING FINGERS
OD_INFERIOR_TEMPORAL_RESTRICTION: 0
OD_NORMAL: 1
OD_SUPERIOR_TEMPORAL_RESTRICTION: 0
OS_INFERIOR_TEMPORAL_RESTRICTION: 0
OS_SUPERIOR_TEMPORAL_RESTRICTION: 0
OD_SUPERIOR_NASAL_RESTRICTION: 0
OS_SUPERIOR_NASAL_RESTRICTION: 0

## 2024-02-09 ASSESSMENT — SLIT LAMP EXAM - LIDS
COMMENTS: NORMAL
COMMENTS: NORMAL

## 2024-02-09 ASSESSMENT — VISUAL ACUITY
OS_SC: 20/50
OD_PH_CC: 20/25
OD_PH_CC+: -2
CORRECTION_TYPE: GLASSES
OD_CC: 20/40
OD_CC+: -1
METHOD: SNELLEN - LINEAR

## 2024-02-09 ASSESSMENT — CUP TO DISC RATIO
OS_RATIO: 0.3
OD_RATIO: 0.3

## 2024-02-09 ASSESSMENT — EXTERNAL EXAM - RIGHT EYE: OD_EXAM: NORMAL

## 2024-02-09 ASSESSMENT — TONOMETRY
IOP_METHOD: TONOPEN
OS_IOP_MMHG: 17
OD_IOP_MMHG: 17

## 2024-02-09 ASSESSMENT — EXTERNAL EXAM - LEFT EYE: OS_EXAM: NORMAL

## 2024-02-09 NOTE — PATIENT INSTRUCTIONS
Recommend additional testing: CBC, CMP. Please get these done sometime this month at [facility]  Continue Azathioprine at the current dose of 100 mg in the AM and 50 mg in the PM (total 150 mg daily)  No prednisone pills, no Avastin injection for either eye  Let me know if you need any additional letters from work

## 2024-02-09 NOTE — PROGRESS NOTES
Chief Complaint/Presenting Concern:  Uveitis follow up    Interval History of Present Ocular Illness:  Parul Veliz is a 63 year old patient who returns for follow up of her ampiginous choroiditis. We last saw each other on 11/3/23 at which time things were inactive and we continued monitoring without additional avastin injection in the right eye nor changes in her azathioprine.     Parul reports no major changes in vision but has had more fatigue and light sensitivity after significant computer use during the day.    Interval Updates to Medical/Family/Social History:    Recently got COVID and we discussed a short hold of azathioprine and then resumed one week later.   Had bout of dry skin recently and saw dermatology. They started her on prescription hydrocortisone cream and a new shampoo. This has helped. She is using the cream on her brows and face but not on the eyelids or near eyes.    Relevant Review of Systems Updates:  Feeling well other than some fatigue and some shortness of breath but not affecting normal activity    Laboratory Testing  Normal/negative: 11/14/23: CBC, CMP     Current eye related medications: Azathioprine 100mg in the AM and 50mg in the PM, AREDS 2 vitamin, infrequent artificial tears     Retina/Uveitis Imaging:   OCT Spectralis Macula February 9, 2024  Right eye: Focal area of atrophy. PED remains inactive, no subretinal fluid. Trace stable inner retinal cystoid changes but no new fluid. No PP fluid.   Left eye:Focal atrophy with some fluctuation in overlying cysts, no subretinal fluid. No PP fluid    Optos Photos and Fundus Autofluorescence February 9, 2024  Right eye: Stable pigmentary changes in macula, temporal atrophic spots.   Stable area of hypo-FAF in macula, no new hyper-FAF spots   Left eye: Stable pigmentary changes in macula, Stable area of   hypo-FAF in macula, no new hyper-FAF spots     Assessment:     1. Ampiginous choroiditis of both eyes  No new spots or evidence  of active inflammation    2. 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 Retinal macular atrophy-both eyes  Stable areas of hypo-autofluorescence     4. High risk medication use  Azathioprine 150 mg total daily     Plan/Recommendations:    Discussed findings with patient. The eyes are doing well without active inflammation nor new spots in either eye. We can continue to monitor on this dose of azathioprine  The CNV in the right eye remains inactive and we have not done an avastin injection since 06/2023. We can continue monitoring without an Avastin injection and without adjustments in Azathioprine nor prednisone pills     Eye pressure is normal both eyes at 17,17.  Recommend additional testing: CBC, CMP. Please get these done sometime this month at [facility]  Continue Azathioprine at the current dose of 100 mg in the AM and 50 mg in the PM (total 150 mg daily)  No prednisone pills, no Avastin injection for either eye  Let me know if you need any additional letters from work     RTC     Dr. Menjivar repeat Low Vision refraction   [facility] early-mid June Tonopen, dilate, OCT FA/ICG transit right eye (ordered)    Samira Plascencia MD  Resident Physician, PGY-3  Department of Ophthalmology    Attending Physician Attestation:  Complete documentation of historical and exam elements from today's encounter can be found in the full encounter summary report (not reduplicated in this progress note). I reviewed the chief complaint(s) and history of present illness, and  confirmed and edited as necessary the review of systems, past medical/surgical history, family history, social history, and examination findings as documented by others and the treating Resident or Fellow Physician.    I examined the patient myself, discussed the findings, reviewed all ancillary testing data and modified these results and reports along with  the assessment and plan with the Treating Resident or Fellow Physician. I agree with the note as detailed above.   Sp Shipman M.D.  Uveitis and Medical Retina  February 9, 2024

## 2024-02-09 NOTE — NURSING NOTE
Chief Complaints and History of Present Illnesses   Patient presents with    Retinitis/choroiditis Follow Up     Chief Complaint(s) and History of Present Illness(es)       Retinitis/choroiditis Follow Up              Laterality: both eyes    Onset: gradual    Onset: months ago    Quality: States va is the same since last visit      Severity: moderate    Frequency: intermittently    Associated symptoms: photophobia (has increased with the computer).  Negative for flashes and floaters    Treatments tried: no treatments    Pain scale: 0/10              Comments    Here for ampiginous choroiditis of both eyes  No gtts  Janina Webster COT 9:11 AM February 9, 2024

## 2024-02-09 NOTE — LETTER
2/9/2024       RE: Parul Veliz  [address] [address]     Dear Colleague,    Thank you for referring your patient, Parul Veliz, to the [facility] [place] at [facility]. Please see a copy of my visit note below.    Chief Complaint/Presenting Concern:  Uveitis follow up    Interval History of Present Ocular Illness:  Parul Veliz is a 63 year old patient who returns for follow up of her ampiginous choroiditis. We last saw each other on 11/3/23 at which time things were inactive and we continued monitoring without additional avastin injection in the right eye nor changes in her azathioprine.     Parul reports no major changes in vision but has had more fatigue and light sensitivity after significant computer use during the day.    Interval Updates to Medical/Family/Social History:    Recently got COVID and we discussed a short hold of azathioprine and then resumed one week later.   Had bout of dry skin recently and saw dermatology. They started her on prescription hydrocortisone cream and a new shampoo. This has helped. She is using the cream on her brows and face but not on the eyelids or near eyes.    Relevant Review of Systems Updates:  Feeling well other than some fatigue and some shortness of breath but not affecting normal activity    Laboratory Testing  Normal/negative: 11/14/23: CBC, CMP     Current eye related medications: Azathioprine 100mg in the AM and 50mg in the PM, AREDS 2 vitamin, infrequent artificial tears         Retina/Uveitis Imaging:   OCT Spectralis Macula February 9, 2024  Right eye: Focal area of atrophy. PED remains inactive, no subretinal fluid. Trace stable inner retinal cystoid changes but no new fluid. No PP fluid.   Left eye:Focal atrophy with some fluctuation in overlying cysts, no subretinal fluid. No PP fluid    Optos Photos and Fundus Autofluorescence February 9, 2024  Right eye:  Stable pigmentary changes in macula, temporal atrophic spots.   Stable area of hypo-FAF in macula, no new hyper-FAF spots   Left eye: Stable pigmentary changes in macula, Stable area of   hypo-FAF in macula, no new hyper-FAF spots     Assessment:     1. Ampiginous choroiditis of both eyes  No new spots or evidence of active inflammation    2. 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 Retinal macular atrophy-both eyes  Stable areas of hypo-autofluorescence     4. High risk medication use  Azathioprine 150 mg total daily     Plan/Recommendations:    Discussed findings with patient. The eyes are doing well without active inflammation nor new spots in either eye. We can continue to monitor on this dose of azathioprine  The CNV in the right eye remains inactive and we have not done an avastin injection since 06/2023. We can continue monitoring without an Avastin injection and without adjustments in Azathioprine nor prednisone pills     Eye pressure is normal both eyes at 17,17.  Recommend additional testing: CBC, CMP. Please get these done sometime this month at [facility]  Continue Azathioprine at the current dose of 100 mg in the AM and 50 mg in the PM (total 150 mg daily)  No prednisone pills, no Avastin injection for either eye  Let me know if you need any additional letters from work     RTC     Dr. Menjivar repeat Low Vision refraction   [facility] early-mid June Tonopen, dilate, OCT FA/ICG transit right eye (ordered)    Samira Plascencia MD  Resident Physician, PGY-3  Department of Ophthalmology          Attending Physician Attestation:  Complete documentation of historical and exam elements from today's encounter can be found in the full encounter summary report (not reduplicated in this progress note). I personally obtained the chief complaint(s) and history of present illness. I confirmed and edited as  necessary the review of systems, past medical/surgical history, family history, social history, and examination findings as documented by others; and I examined the patient myself. I personally reviewed the relevant tests, images, and reports as documented above. I formulated and edited as necessary the assessment and plan and discussed the findings and management plan with the patient and family.  Sp Shipman MD.      Again, thank you for allowing me to participate in the care of your patient.      Sincerely,    Sp Shipman MD  [facility] Dept of Ophthalmology  Uveitis and Medical Retina

## 2024-06-04 ENCOUNTER — OFFICE VISIT (OUTPATIENT)
Dept: OPHTHALMOLOGY | Facility: CLINIC | Age: 64
End: 2024-06-04
Attending: OPHTHALMOLOGY
Payer: COMMERCIAL

## 2024-06-04 DIAGNOSIS — H30.143 AMPIGINOUS CHOROIDITIS OF BOTH EYES: Primary | ICD-10-CM

## 2024-06-04 DIAGNOSIS — H30.91 CHOROIDAL NEOVASCULARIZATION OF RIGHT EYE DUE TO CHORIORETINITIS: ICD-10-CM

## 2024-06-04 DIAGNOSIS — H35.051 CHOROIDAL NEOVASCULARIZATION OF RIGHT EYE DUE TO CHORIORETINITIS: ICD-10-CM

## 2024-06-04 DIAGNOSIS — H35.89 RETINAL MACULAR ATROPHY: ICD-10-CM

## 2024-06-04 DIAGNOSIS — Z79.899 HIGH RISK MEDICATION USE: ICD-10-CM

## 2024-06-04 PROCEDURE — 99214 OFFICE O/P EST MOD 30 MIN: CPT | Performed by: OPHTHALMOLOGY

## 2024-06-04 PROCEDURE — 92242 FLUORESCEIN&ICG ANGIOGRAPHY: CPT | Performed by: OPHTHALMOLOGY

## 2024-06-04 PROCEDURE — 99211 OFF/OP EST MAY X REQ PHY/QHP: CPT | Mod: 25 | Performed by: OPHTHALMOLOGY

## 2024-06-04 PROCEDURE — 92134 CPTRZ OPH DX IMG PST SGM RTA: CPT | Performed by: OPHTHALMOLOGY

## 2024-06-04 RX ORDER — AZATHIOPRINE 50 MG/1
50 TABLET ORAL 2 TIMES DAILY
Qty: 180 TABLET | Refills: 1 | Status: SHIPPED | OUTPATIENT
Start: 2024-06-04 | End: 2024-09-18

## 2024-06-04 ASSESSMENT — EXTERNAL EXAM - RIGHT EYE: OD_EXAM: NORMAL

## 2024-06-04 ASSESSMENT — SLIT LAMP EXAM - LIDS
COMMENTS: NORMAL
COMMENTS: NORMAL

## 2024-06-04 ASSESSMENT — EXTERNAL EXAM - LEFT EYE: OS_EXAM: NORMAL

## 2024-06-04 ASSESSMENT — CONF VISUAL FIELD
OS_SUPERIOR_TEMPORAL_RESTRICTION: 0
OD_SUPERIOR_TEMPORAL_RESTRICTION: 0
OD_INFERIOR_TEMPORAL_RESTRICTION: 0
OS_INFERIOR_NASAL_RESTRICTION: 0
OD_SUPERIOR_NASAL_RESTRICTION: 0
OD_NORMAL: 1
OD_INFERIOR_NASAL_RESTRICTION: 0
OS_NORMAL: 1
OS_SUPERIOR_NASAL_RESTRICTION: 0
OS_INFERIOR_TEMPORAL_RESTRICTION: 0

## 2024-06-04 ASSESSMENT — REFRACTION_WEARINGRX
OS_CYLINDER: +1.00
OD_AXIS: 005
SPECS_TYPE: COMPUTER
OD_SPHERE: +0.50
OD_CYLINDER: +1.25
OS_AXIS: 020
OS_SPHERE: +1.25

## 2024-06-04 ASSESSMENT — CUP TO DISC RATIO
OD_RATIO: 0.3
OS_RATIO: 0.3

## 2024-06-04 ASSESSMENT — VISUAL ACUITY
METHOD: SNELLEN - LINEAR
OS_CC+: +2
OD_CC: 20/25
OS_CC: 20/40
OD_SC+: +2
OD_SC: 20/30
OD_CC+: -2

## 2024-06-04 ASSESSMENT — TONOMETRY
OS_IOP_MMHG: 11
IOP_METHOD: TONOPEN
OD_IOP_MMHG: 13

## 2024-06-04 NOTE — NURSING NOTE
Chief Complaints and History of Present Illnesses   Patient presents with    Uveitis Follow-Up     1. Ampiginous choroiditis of both eyes   2. 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 Retinal macular atrophy-both eyes   4. High risk medication use  Azathioprine 150 mg total daily           Chief Complaint(s) and History of Present Illness(es)       Uveitis Follow-Up              Laterality: both eyes    Quality: blurred vision    Associated symptoms: Negative for flashes and floaters    Pain scale: 0/10    Comments: 1. Ampiginous choroiditis of both eyes   2. 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 Retinal macular atrophy-both eyes   4. High risk medication use  Azathioprine 150 mg total daily                    Comments    Pt reports improved vision in the right eye, however both eyes remain blurry. She denies distortion. She states she needs a new refraction for glasses but will sched elsewhere as she's been rescheduled multiple times after booking months in advance. She needs eye allergies, but denies eye pain.     Danita Lechuga OA 2:36 PM June 4, 2024

## 2024-06-04 NOTE — PROGRESS NOTES
"Chief Complaint/Presenting Concern:  Uveitis follow up    Interval History of Present Ocular Illness:  Parul Veliz is a 63 year old patient who returns for follow up of her ampiginous choroiditis. We last saw each other 2/9/24/ at which time things were inactive and we were continuing to monitor without Avastin injections nor adjustments to azathioprine.     Parul states no changes in vision since last visit. Light sensitivity is stable.     Interval Updates to Medical/Family/Social History:  February 2024 she went to the ED for \"chest tightness\" associated with shoulder and arm pain. Cardiology has ruled out any cardiac involvement and she is currently has a referral to Pulmonology but has not yet set up an appointment. She things overall the chest tightness has mostly resolved.      Relevant Review of Systems Updates:Still experiencing fatigue. Skin issues coming and going even with creams    Laboratory Testing  Normal/negative: 2/26/24: CBC,BMP WNL     Current eye related medications: Azathioprine 150 mg daily.     Retina/Uveitis Imaging:   OCT Spectralis Macula June 4, 2024  right eye: Broad shallow PED adjacent to area of focal retinal atrophy.  No fluid.  Stable.  No peripapillary fluid  left eye: Focal atrophy.  Some increase in overlying intraretinal cystoid degeneration but no true fluid. No peripapillary fluid.     Optos FA/ICG June 4, 2024  right eye: Normal transit.  Early small vessel leakage in the macula which is stable without leakage nor CNV activity  Stable peripheral small punctate hyperfluorescent spots in the periphery.  Some nasal micro aneurysmal type changes.  No disc leakage  ICG with hypocyanescent spot in the macula and some in the periphery. Macula spots smaller and peripheral spots also improving  Left eye: Staining in the macula without leakage. Stable peripheral small punctate hyperfluorescent spots in the periphery. NO disc leakage  ICG with hypocyanescent spot in the macula " and some in the periphery.  Macula spots smaller and peripheral spots also improving    Assessment:     1. Ampiginous choroiditis of both eyes  No clinically active inflammation, no new spots on angiography    2. 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 Retinal macular atrophy - Both Eyes  Stable right eye, some subtle degenerative cystoid changes over atrophic area in the macula of the left eye     4. High risk medication use  Azathioprine 150 mg daily    Plan/Recommendations:    Discussed findings with patient. The central inflammatory spots are inactive on OCT and improved on ICG imaging. The CNV in the right eye remains inactive there is only slight progression of the area of atrophy in the left eye. Given nearly 2 years on azathioprine (1.5 years at this dose) and no injection of Avastin in the right eye for the last year, we discussed slow interval reduction of Azathioprine dose and will adjust as below  Eye pressure is normal in each eye   Recommend additional testing: CBC, CMP this month or next month  For the Azathioprine, let's reduce to 50 mg in the morning and 50 mg in the evening for a total of 100 mg daily.   No prednisone pills, no eye injections needed for either eye    RTC Late Sept-early October tonopen, dilate, OCT (ordered)    Physician Attestation     Attending Physician Attestation:  Complete documentation of historical and exam elements from today's encounter can be found in the full encounter summary report (not reduplicated in this progress note). I personally obtained the chief complaint(s) and history of present illness. I confirmed and edited as necessary the review of systems, past medical/surgical history, family history, social history, and examination findings as documented by others; and I examined the patient myself. I personally reviewed the relevant tests, images, and reports as documented above. I formulated  and edited as necessary the assessment and plan and discussed the findings and management plan with the patient and family members present at the time of this visit.  Sp Shipman M.D., Uveitis and Medical Retina, June 4, 2024

## 2024-06-06 NOTE — PATIENT INSTRUCTIONS
Recommend additional testing: CBC, CMP this month or next month  For the Azathioprine, let's reduce to 50 mg in the morning and 50 mg in the evening for a total of 100 mg daily.   No prednisone pills, no eye injections needed for either eye

## 2024-09-18 ENCOUNTER — OFFICE VISIT (OUTPATIENT)
Dept: OPHTHALMOLOGY | Facility: CLINIC | Age: 64
End: 2024-09-18
Attending: OPHTHALMOLOGY
Payer: COMMERCIAL

## 2024-09-18 DIAGNOSIS — H35.051 CHOROIDAL NEOVASCULARIZATION OF RIGHT EYE DUE TO CHORIORETINITIS: ICD-10-CM

## 2024-09-18 DIAGNOSIS — Z79.899 HIGH RISK MEDICATION USE: ICD-10-CM

## 2024-09-18 DIAGNOSIS — H30.143 AMPIGINOUS CHOROIDITIS OF BOTH EYES: Primary | ICD-10-CM

## 2024-09-18 DIAGNOSIS — H30.91 CHOROIDAL NEOVASCULARIZATION OF RIGHT EYE DUE TO CHORIORETINITIS: ICD-10-CM

## 2024-09-18 DIAGNOSIS — H35.89 RETINAL MACULAR ATROPHY: ICD-10-CM

## 2024-09-18 PROBLEM — G45.9 TIA (TRANSIENT ISCHEMIC ATTACK): Status: ACTIVE | Noted: 2022-08-25

## 2024-09-18 PROCEDURE — 92134 CPTRZ OPH DX IMG PST SGM RTA: CPT | Performed by: OPHTHALMOLOGY

## 2024-09-18 PROCEDURE — 99214 OFFICE O/P EST MOD 30 MIN: CPT | Performed by: OPHTHALMOLOGY

## 2024-09-18 PROCEDURE — 99211 OFF/OP EST MAY X REQ PHY/QHP: CPT | Performed by: OPHTHALMOLOGY

## 2024-09-18 RX ORDER — AZATHIOPRINE 50 MG/1
100 TABLET ORAL DAILY
Qty: 180 TABLET | Refills: 1 | Status: SHIPPED | OUTPATIENT
Start: 2024-09-18

## 2024-09-18 ASSESSMENT — CONF VISUAL FIELD
OS_NORMAL: 1
OD_INFERIOR_TEMPORAL_RESTRICTION: 0
OD_INFERIOR_NASAL_RESTRICTION: 0
OS_SUPERIOR_NASAL_RESTRICTION: 0
OD_SUPERIOR_NASAL_RESTRICTION: 0
METHOD: COUNTING FINGERS
OS_SUPERIOR_TEMPORAL_RESTRICTION: 0
OD_SUPERIOR_TEMPORAL_RESTRICTION: 0
OD_NORMAL: 1
OS_INFERIOR_TEMPORAL_RESTRICTION: 0
OS_INFERIOR_NASAL_RESTRICTION: 0

## 2024-09-18 ASSESSMENT — TONOMETRY
OD_IOP_MMHG: 15
OS_IOP_MMHG: 15
IOP_METHOD: TONOPEN

## 2024-09-18 ASSESSMENT — REFRACTION_WEARINGRX
OS_CYLINDER: +1.00
OD_CYLINDER: +1.25
OS_AXIS: 020
OD_SPHERE: +0.50
SPECS_TYPE: COMPUTER
OD_AXIS: 005
OS_SPHERE: +1.25

## 2024-09-18 ASSESSMENT — SLIT LAMP EXAM - LIDS
COMMENTS: NORMAL
COMMENTS: NORMAL

## 2024-09-18 ASSESSMENT — VISUAL ACUITY
OS_CC+: -2
OD_CC: 20/25
OS_CC: 20/25
CORRECTION_TYPE: GLASSES
METHOD: SNELLEN - LINEAR
OD_CC+: -1

## 2024-09-18 ASSESSMENT — CUP TO DISC RATIO
OS_RATIO: 0.3
OD_RATIO: 0.3

## 2024-09-18 ASSESSMENT — EXTERNAL EXAM - RIGHT EYE: OD_EXAM: NORMAL

## 2024-09-18 ASSESSMENT — EXTERNAL EXAM - LEFT EYE: OS_EXAM: NORMAL

## 2024-09-18 NOTE — NURSING NOTE
Chief Complaints and History of Present Illnesses   Patient presents with    Ampiginous choroiditis of both eyes     Chief Complaint(s) and History of Present Illness(es)       Ampiginous choroiditis of both eyes               Comments    Patient states that her vision is the same since she was here last. She states that she is still light sensitive. She states that she has some more aches and pains since lowering Azathioprine. She states that her eyes are dry from allergies. No flashes of light. Notices more floaters in the Le when looking at the computer screen.     Ocular Meds: artifical tears as needed  Oral Meds:Azathioprine mg in the morning and 50 mg in the evening    Blu ROSS, September 18, 2024 9:35 AM

## 2024-09-18 NOTE — PATIENT INSTRUCTIONS
Continue these medicines unchanged: Azathioprine 100 mg (two pills) in the morning, AREDS2 two vitamins at night  When you get a Tetanus vaccine, okay to hold Azathioprine the day before, day of and three days after (5 days total). Then resume as previously

## 2024-09-18 NOTE — PROGRESS NOTES
Chief Complaint/Presenting Concern:  Uveitis follow up    Interval History of Present Ocular Illness:  Parul Veliz is a 64 year old patient who returns for follow up of her Ampiginous Choroiditis of each eye. Last visit things were doing well so we elected to reduce the dose of Azathioprine to 100 mg total.    Parul reports things are doing well on two pills in the morning. She went to [employer] and got new glasses and these are working well.     Interval Updates to Medical/Family/Social History:    Has travelled to [place] and [place] and health is doing well    Relevant Review of Systems Updates:  Fatigue and knee pain perhaps slightly more than when on higher dose. Still not sleeping well    Labs: 7/2024: CMP WNL other than elevated Alk Phos 162 (elevated and previously around the same), CBC WNL     Current eye related medications: Azathioprine 100 mg (two pills) in the morning, AREDS2 two vitamins at night    Retina/Uveitis Imaging:   OCT Spectralis Macula September 18, 2024  right eye: Superior atrophy stable, some subtle inferior atrophy with overlying cystoid degenerative changes, PED but no true fluid. NO PP fluid  left eye: Stable atrophy with degenerative appearing inner retinal cystoid changes, no other fluid.NO PP fluid    Assessment:     1. Ampiginous choroiditis of both eyes  No active inflammation on reduced dose of Azathiprine    2. 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 Retinal macular atrophy - Both Eyes  Stable primary area right eye, some changes inferiorly to primary area    Left eye stable    4. High risk medication use  Azathioprine 100 mg total in the day.     Plan/Recommendations:    Discussed findings with patient. The eyes are doing well without inflammation and no signs of CNV activity. There is slightly more atrophy right eye but minimal. We can monitor on the same dose of azathioprine without injections nor other  modifications  Eye pressure is normal in each eye   Recommend additional testing: None at this time. We can monitor the Alk Phosphatase elevation which is generally stable and get labs again in early December  Continue these medicines unchanged: Azathioprine 100 mg (two pills) in the morning, AREDS2 two vitamins at night  When you get a Tetanus vaccine, okay to hold Azathioprine the day before, day of and three days after (5 days total). Then resume as previously     RTC 3 months tonopen, dilate, OCT, Photos     Physician Attestation     Attending Physician Attestation:  Complete documentation of historical and exam elements from today's encounter can be found in the full encounter summary report (not reduplicated in this progress note). I personally obtained the chief complaint(s) and history of present illness. I confirmed and edited as necessary the review of systems, past medical/surgical history, family history, social history, and examination findings as documented by others; and I examined the patient myself. I personally reviewed the relevant tests, images, and reports as documented above. I formulated and edited as necessary the assessment and plan and discussed the findings and management plan with the patient and family members present at the time of this visit.  Sp Shipman M.D., Uveitis and Medical Retina, September 18, 2024

## 2024-09-18 NOTE — LETTER
9/18/2024       RE: Parul Veliz  [address] [address]     Dear Colleague,    Thank you for referring your patient, Parul Veliz, to the [facility] [place] at [facility]. Please see a copy of my visit note below.    Chief Complaint/Presenting Concern:  Uveitis follow up    Interval History of Present Ocular Illness:  Parul Veliz is a 64 year old patient who returns for follow up of her Ampiginous Choroiditis of each eye. Last visit things were doing well so we elected to reduce the dose of Azathioprine to 100 mg total.    Parul reports things are doing well on two pills in the morning. She went to [employer] and got new glasses and these are working well.     Interval Updates to Medical/Family/Social History:    Has travelled to [place] and [place] and health is doing well    Relevant Review of Systems Updates:  Fatigue and knee pain perhaps slightly more than when on higher dose. Still not sleeping well    Labs: 7/2024: CMP WNL other than elevated Alk Phos 162 (elevated and previously around the same), CBC WNL     Current eye related medications: Azathioprine 100 mg (two pills) in the morning, AREDS2 two vitamins at night    Retina/Uveitis Imaging:   OCT Spectralis Macula September 18, 2024  right eye: Superior atrophy stable, some subtle inferior atrophy with overlying cystoid degenerative changes, PED but no true fluid. NO PP fluid  left eye: Stable atrophy with degenerative appearing inner retinal cystoid changes, no other fluid.NO PP fluid    Assessment:     1. Ampiginous choroiditis of both eyes  No active inflammation on reduced dose of Azathiprine    2. 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 Retinal macular atrophy - Both Eyes  Stable primary area right eye, some changes inferiorly to primary area    Left eye stable    4. High risk medication  use  Azathioprine 100 mg total in the day.     Plan/Recommendations:    Discussed findings with patient. The eyes are doing well without inflammation and no signs of CNV activity. There is slightly more atrophy right eye but minimal. We can monitor on the same dose of Azathioprine without injections nor other modifications  Eye pressure is normal in each eye   Recommend additional testing: None at this time. We can monitor the Alk Phosphatase elevation which is generally stable and get labs again in early December  Continue these medicines unchanged: Azathioprine 100 mg (two pills) in the morning, AREDS2 two vitamins at night  When you get a Tetanus vaccine, okay to hold Azathioprine the day before, day of and three days after (5 days total). Then resume as previously     RTC 3 months tonopen, dilate, OCT, Photos     Physician Attestation    Attending Physician Attestation:  Complete documentation of historical and exam elements from today's encounter can be found in the full encounter summary report (not reduplicated in this progress note). I personally obtained the chief complaint(s) and history of present illness. I confirmed and edited as necessary the review of systems, past medical/surgical history, family history, social history, and examination findings as documented by others; and I examined the patient myself. I personally reviewed the relevant tests, images, and reports as documented above. I formulated and edited as necessary the assessment and plan and discussed the findings and management plan with the patient and family members present at the time of this visit.  Sp Shipman M.D., Uveitis and Medical Retina, September 18, 2024     Again, thank you for allowing me to participate in the care of your patient.      Sincerely,    Sp Shipman MD  Uveitis and Medical Retina    Department of Ophthalmology & Visual Neurosciences  [facility]

## 2024-12-10 ENCOUNTER — TRANSFERRED RECORDS (OUTPATIENT)
Dept: HEALTH INFORMATION MANAGEMENT | Facility: CLINIC | Age: 64
End: 2024-12-10
Payer: COMMERCIAL

## 2024-12-18 ENCOUNTER — OFFICE VISIT (OUTPATIENT)
Dept: OPHTHALMOLOGY | Facility: CLINIC | Age: 64
End: 2024-12-18
Attending: OPHTHALMOLOGY
Payer: COMMERCIAL

## 2024-12-18 DIAGNOSIS — Z79.899 HIGH RISK MEDICATION USE: ICD-10-CM

## 2024-12-18 DIAGNOSIS — H30.143 AMPIGINOUS CHOROIDITIS OF BOTH EYES: Primary | ICD-10-CM

## 2024-12-18 DIAGNOSIS — H35.051 CHOROIDAL NEOVASCULARIZATION OF RIGHT EYE DUE TO CHORIORETINITIS: ICD-10-CM

## 2024-12-18 DIAGNOSIS — H30.91 CHOROIDAL NEOVASCULARIZATION OF RIGHT EYE DUE TO CHORIORETINITIS: ICD-10-CM

## 2024-12-18 DIAGNOSIS — H35.89 RETINAL MACULAR ATROPHY: ICD-10-CM

## 2024-12-18 PROCEDURE — 92250 FUNDUS PHOTOGRAPHY W/I&R: CPT | Performed by: OPHTHALMOLOGY

## 2024-12-18 PROCEDURE — 92134 CPTRZ OPH DX IMG PST SGM RTA: CPT | Performed by: OPHTHALMOLOGY

## 2024-12-18 PROCEDURE — 99211 OFF/OP EST MAY X REQ PHY/QHP: CPT | Performed by: OPHTHALMOLOGY

## 2024-12-18 RX ORDER — PREDNISONE 10 MG/1
TABLET ORAL
Qty: 60 TABLET | Refills: 1 | Status: SHIPPED | OUTPATIENT
Start: 2024-12-18

## 2024-12-18 RX ORDER — AZATHIOPRINE 50 MG/1
TABLET ORAL
Qty: 90 TABLET | Refills: 4 | Status: SHIPPED | OUTPATIENT
Start: 2024-12-18

## 2024-12-18 ASSESSMENT — REFRACTION_WEARINGRX
OS_CYLINDER: +1.00
OD_SPHERE: +0.50
SPECS_TYPE: COMPUTER
OD_CYLINDER: +1.25
OS_AXIS: 020
OS_SPHERE: +1.25
OD_AXIS: 005

## 2024-12-18 ASSESSMENT — CONF VISUAL FIELD
OD_NORMAL: 1
OS_SUPERIOR_TEMPORAL_RESTRICTION: 0
OD_INFERIOR_NASAL_RESTRICTION: 0
METHOD: COUNTING FINGERS
OS_INFERIOR_NASAL_RESTRICTION: 0
OD_INFERIOR_TEMPORAL_RESTRICTION: 0
OD_SUPERIOR_NASAL_RESTRICTION: 0
OS_NORMAL: 1
OD_SUPERIOR_TEMPORAL_RESTRICTION: 0
OS_SUPERIOR_NASAL_RESTRICTION: 0
OS_INFERIOR_TEMPORAL_RESTRICTION: 0

## 2024-12-18 ASSESSMENT — VISUAL ACUITY
OS_CC: 20/25
METHOD: SNELLEN - LINEAR
OD_CC: 20/30
OS_CC+: +2
CORRECTION_TYPE: GLASSES
OD_CC+: +2

## 2024-12-18 ASSESSMENT — EXTERNAL EXAM - LEFT EYE: OS_EXAM: NORMAL

## 2024-12-18 ASSESSMENT — SLIT LAMP EXAM - LIDS
COMMENTS: FLOPPY EYELIDS
COMMENTS: FLOPPY EYELIDS

## 2024-12-18 ASSESSMENT — EXTERNAL EXAM - RIGHT EYE: OD_EXAM: NORMAL

## 2024-12-18 ASSESSMENT — TONOMETRY
OD_IOP_MMHG: 16
IOP_METHOD: TONOPEN
OS_IOP_MMHG: 17

## 2024-12-18 ASSESSMENT — CUP TO DISC RATIO
OS_RATIO: 0.2
OD_RATIO: 0.2

## 2024-12-18 NOTE — LETTER
12/18/2024       RE: Parul eVliz  [address] [address]     Dear Colleague,    Thank you for referring your patient, Parul Veliz, to the [facility] [place] at [facility]. Please see a copy of my visit note below.    Chief Complaint/Presenting Concern:  Uveitis follow up     Interval History of Present Ocular Illness:  Parul Veliz is a 64 year old patient who returns for follow up of ampiginous choroiditis of each eye. Patient's last visit was on 9/18/2024. At that visit, everything appeared stable without signs of inflammation or CNV activity and decision was made to continue the same dose of azathioprine at 100 mg daily.     Overall, Ms. Veliz feels that her symptoms have been stable. No changes to vision. No increased photosensitivity. No pain.     Interval Updates to Medical/Family/Social History:  No new changes to health.   No plans to travel. Patient has not received her tetanus shot yet.   Work has been busy. May be asked to return to in person work    Relevant Review of Systems Updates:   Fatigue and difficulty with sleeping despite lowering the dose of azathioprine.     Laboratory Testing  December 2024: CMP WNL. Alk Phos WNL at 148, CBC WNL      Current eye related medications: azathioprine 100 mg daily, AREDS 2 vitamis     Retina/Uveitis Imaging:   OCT Spectralis Macula December 18, 2024  right eye: Focal atrophy stable. Central there is a stable PED and a faint area of subretinal lucency. No PP Fluid  left eye: Drusen, focal atrophy with stable inner retinal cystoid degenerative type changes overlying within the inner retina    Optos Fundus Photos OU (both eyes) December 18, 2024  right eye: No disc edema, stable atrophy with corresponding hypo-FAF patch and one stable area of hypo-FAF. Peripheral hypopigmented spots  left eye: No disc edema, Focal atrophy in the macula with hyperpigmentation and  corresponding hypo-FAF patch. Temporal peripheral hypopigmented patches.     Assessment:     1. Ampiginous choroiditis of both eyes (Primary)  No active inflammation on 100 mg azathioprine    2. 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 Retinal macular atrophy - Both Eyes  No progression in either eye     4. High risk medication use  Azathioprine 100 mg total in the day    Plan/Recommendations:    Discussed findings with patient. The eyes are doing well without signs of inflammation but there is subtle CNV activity with increased subretinal fluid in the right eye. We discussed an injection of Avastin or modifying systemic therapy and elected to try more Azathioprine and a short course of prednisone  and hold off on an injection today  Eye pressure is 16 and 17 today. This is normal and can be monitored   We will plan for repeat labs in March 2025.   Continue AREDS2 two vitamins at night.   For the Azathioprine, let's increase the dose to 2 pills (100 mg) in the morning and 1 pill (50 mg) in the evening. This will be a total of 150 mg daily  Let's start a short course of prednisone: Take 3 pills (30 mg) each AM with food for 1 week, then 2 pills (20 mg) each AM with food for one week, then 1 pill (10 mg) each AM with food for 1 week then stop  When you get a Tetanus vaccine, okay to hold Azathioprine the day before, day of and three days after (5 days total). Then resume as previously.  No other treatments needed.  Let me know if you need a letter for work     RTC Week of Jan 20 or 27 armando deng, OCT, ?Avastin right eye     Jeanne Barrientos MD  PGY-3  Department of Ophthalmology  December 18, 2024 11:12 AM             Attending Physician Attestation:  Complete documentation of historical and exam elements from today's encounter can be found in the full encounter summary report (not reduplicated in this progress note). I personally  obtained the chief complaint(s) and history of present illness. I confirmed and edited as necessary the review of systems, past medical/surgical history, family history, social history, and examination findings as documented by others; and I examined the patient myself. I personally reviewed the relevant tests, images, and reports as documented above. I formulated and edited as necessary the assessment and plan and discussed the findings and management plan with the patient and family.  Sp Shipman MD.      Again, thank you for allowing me to participate in the care of your patient.      Sincerely,    Sp Shipman MD  Uveitis and Medical Retina    Department of Ophthalmology & Visual Neurosciences  [facility]

## 2024-12-18 NOTE — NURSING NOTE
Chief Complaints and History of Present Illnesses   Patient presents with    Retinitis/choroiditis Follow Up     Chief Complaint(s) and History of Present Illness(es)       Retinitis/choroiditis Follow Up              Laterality: both eyes    Onset: gradual    Onset: months ago    Quality: States va is the same since last visit      Severity: moderate    Frequency: intermittently    Associated symptoms: photophobia.  Negative for flashes and floaters    Treatments tried: artificial tears    Pain scale: 0/10              Comments    Here for ampiginous choroiditis of both eyes  AT  Azathioprine   AREDS 2  Janina Webster COT 9:26 AM December 18, 2024

## 2024-12-18 NOTE — PATIENT INSTRUCTIONS
For the Azathioprine, let's increase the dose to 2 pills (100 mg) in the morning and 1 pill (50 mg) in the evening. This will be a total of 150 mg daily  Let's start a short course of prednisone: Take 3 pills (30 mg) each AM with food for 1 week, then 2 pills (20 mg) each AM with food for one week, then 1 pill (10 mg) each AM with food for 1 week then stop  When you get a Tetanus vaccine, okay to hold Azathioprine the day before, day of and three days after (5 days total). Then resume as previously.  No other treatments needed.  Let me know if you need a letter for work

## 2024-12-18 NOTE — PROGRESS NOTES
Chief Complaint/Presenting Concern:  Uveitis follow up     Interval History of Present Ocular Illness:  Parul Veliz is a 64 year old patient who returns for follow up of ampiginous choroiditis of each eye. Patient's last visit was on 9/18/2024. At that visit, everything appeared stable without signs of inflammation or CNV activity and decision was made to continue the same dose of azathioprine at 100 mg daily.     Overall, Ms. Veliz feels that her symptoms have been stable. No changes to vision. No increased photosensitivity. No pain.     Interval Updates to Medical/Family/Social History:  No new changes to health.   No plans to travel. Patient has not received her tetanus shot yet.   Work has been busy. May be asked to return to in person work    Relevant Review of Systems Updates:   Fatigue and difficulty with sleeping despite lowering the dose of azathioprine.     Laboratory Testing  December 2024: CMP WNL. Alk Phos WNL at 148, CBC WNL      Current eye related medications: azathioprine 100 mg daily, AREDS 2 vitamis     Retina/Uveitis Imaging:   OCT Spectralis Macula December 18, 2024  right eye: Focal atrophy stable. Central there is a stable PED and a faint area of subretinal lucency. No PP Fluid  left eye: Drusen, focal atrophy with stable inner retinal cystoid degenerative type changes overlying within the inner retina    Optos Fundus Photos OU (both eyes) December 18, 2024  right eye: No disc edema, stable atrophy with corresponding hypo-FAF patch and one stable area of hypo-FAF. Peripheral hypopigmented spots  left eye: No disc edema, Focal atrophy in the macula with hyperpigmentation and corresponding hypo-FAF patch. Temporal peripheral hypopigmented patches.       Assessment:     1. Ampiginous choroiditis of both eyes (Primary)  No active inflammation on 100 mg azathioprine    2. 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 Retinal macular atrophy - Both Eyes  No progression in either eye     4. High risk medication use  Azathioprine 100 mg total in the day    Plan/Recommendations:    Discussed findings with patient. The eyes are doing well without signs of inflammation but there is subtle CNV activity with increased subretinal fluid in the right eye. We discussed an injection of Avastin or modifying systemic therapy and elected to try more Azathioprine and a short course of prednisone  and hold off on an injection today  Eye pressure is 16 and 17 today. This is normal and can be monitored   We will plan for repeat labs in March 2025.   Continue AREDS2 two vitamins at night.   For the Azathioprine, let's increase the dose to 2 pills (100 mg) in the morning and 1 pill (50 mg) in the evening. This will be a total of 150 mg daily  Let's start a short course of prednisone: Take 3 pills (30 mg) each AM with food for 1 week, then 2 pills (20 mg) each AM with food for one week, then 1 pill (10 mg) each AM with food for 1 week then stop  When you get a Tetanus vaccine, okay to hold Azathioprine the day before, day of and three days after (5 days total). Then resume as previously.  No other treatments needed.  Let me know if you need a letter for work     RTC Week of Jan 20 or 27 armando deng, OCT, ?Avastin right eye     Jeanne Barrientos MD  PGY-3  Department of Ophthalmology  December 18, 2024 11:12 AM     Attending Physician Attestation:  Complete documentation of historical and exam elements from today's encounter can be found in the full encounter summary report (not reduplicated in this progress note). I reviewed the chief complaint(s) and history of present illness, and  confirmed and edited as necessary the review of systems, past medical/surgical history, family history, social history, and examination findings as documented by others and the treating Resident or Fellow Physician.    I examined the patient myself, discussed the  findings, reviewed all ancillary testing data and modified these results and reports along with the assessment and plan with the Treating Resident or Fellow Physician. I agree with the note as detailed above.   Sp Shipman M.D.  Uveitis and Medical Retina  December 18, 2024

## 2024-12-29 ENCOUNTER — HEALTH MAINTENANCE LETTER (OUTPATIENT)
Age: 64
End: 2024-12-29

## 2025-01-27 ENCOUNTER — OFFICE VISIT (OUTPATIENT)
Dept: OPHTHALMOLOGY | Facility: CLINIC | Age: 65
End: 2025-01-27
Attending: OPHTHALMOLOGY
Payer: COMMERCIAL

## 2025-01-27 DIAGNOSIS — Z79.899 HIGH RISK MEDICATION USE: ICD-10-CM

## 2025-01-27 DIAGNOSIS — H35.051 CHOROIDAL NEOVASCULARIZATION OF RIGHT EYE DUE TO CHORIORETINITIS: Primary | ICD-10-CM

## 2025-01-27 DIAGNOSIS — H30.91 CHOROIDAL NEOVASCULARIZATION OF RIGHT EYE DUE TO CHORIORETINITIS: Primary | ICD-10-CM

## 2025-01-27 DIAGNOSIS — H35.89 RETINAL MACULAR ATROPHY: ICD-10-CM

## 2025-01-27 DIAGNOSIS — H30.143 AMPIGINOUS CHOROIDITIS OF BOTH EYES: ICD-10-CM

## 2025-01-27 PROCEDURE — 92134 CPTRZ OPH DX IMG PST SGM RTA: CPT | Performed by: OPHTHALMOLOGY

## 2025-01-27 PROCEDURE — 99214 OFFICE O/P EST MOD 30 MIN: CPT | Performed by: OPHTHALMOLOGY

## 2025-01-27 ASSESSMENT — CONF VISUAL FIELD
OD_INFERIOR_NASAL_RESTRICTION: 0
OS_INFERIOR_NASAL_RESTRICTION: 0
OS_INFERIOR_TEMPORAL_RESTRICTION: 0
OS_SUPERIOR_TEMPORAL_RESTRICTION: 0
OD_SUPERIOR_NASAL_RESTRICTION: 0
OD_INFERIOR_TEMPORAL_RESTRICTION: 0
OD_SUPERIOR_TEMPORAL_RESTRICTION: 0
METHOD: COUNTING FINGERS
OD_NORMAL: 1
OS_SUPERIOR_NASAL_RESTRICTION: 0
OS_NORMAL: 1

## 2025-01-27 ASSESSMENT — REFRACTION_WEARINGRX
OS_AXIS: 020
OD_AXIS: 005
SPECS_TYPE: COMPUTER
OD_CYLINDER: +1.25
OD_SPHERE: +0.50
OS_SPHERE: +1.25
OS_CYLINDER: +1.00

## 2025-01-27 ASSESSMENT — EXTERNAL EXAM - LEFT EYE: OS_EXAM: NORMAL

## 2025-01-27 ASSESSMENT — EXTERNAL EXAM - RIGHT EYE: OD_EXAM: NORMAL

## 2025-01-27 ASSESSMENT — TONOMETRY
IOP_METHOD: TONOPEN
OS_IOP_MMHG: 18
OD_IOP_MMHG: 19

## 2025-01-27 ASSESSMENT — VISUAL ACUITY
METHOD: SNELLEN - LINEAR
OS_CC: 20/30
OS_CC+: -2
OD_SC: 20/25
CORRECTION_TYPE: GLASSES
OS_PH_CC: 20/25

## 2025-01-27 ASSESSMENT — CUP TO DISC RATIO
OD_RATIO: 0.2
OS_RATIO: 0.2

## 2025-01-27 ASSESSMENT — SLIT LAMP EXAM - LIDS
COMMENTS: FLOPPY EYELIDS
COMMENTS: FLOPPY EYELIDS

## 2025-01-27 NOTE — LETTER
1/27/2025       RE: Parul Veliz  [address] [address]     Dear Colleague,    Thank you for referring your patient, Parul Veliz, to the [facility] [place] at [facility]. Please see a copy of my visit note below.    Chief Complaint/Presenting Concern:  Uveitis follow up    Interval History of Present Ocular Illness:  Parul Veliz is a 64 year old patient who returns for follow up of her ampiginous choroiditis and CNV right eye. Last visit we saw some changes on the scans of the right eye and elected to increase Azathioprine and start a course of prednisone.    Parul reports more light sensitivity when working indoors moreso than at night. Prednisone was okay and azathioprine increase going okay    Interval Updates to Medical/Family/Social History:  No major changes    Relevant Review of Systems Updates:  No stomach issues    No new labs     Current eye related medications: Off prednisone, Azathioprine back on 2 pill (100 mg) in the AM and 1 pill (50 mg) in the PM     Retina/Uveitis Imaging:   OCT Spectralis Macula January 27, 2025  right eye: Focal areas of atrophy, no new spots, resolved subretinal fluid  left eye: Focal area of atrophy, no fluid, no new spots          Assessment:     1. 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 Ampiginous choroiditis of both eyes  3. Retinal macular atrophy - Both Eyes  No new spots, no new atrophy    4. High risk medication use  Azathioprine back on 2 pill (100 mg) in the AM and 1 pill (50 mg) in the PM. Total 150 mg daily    Plan/Recommendations:    Discussed findings with patient. The focal area of subretinal fluid in the right eye has resolved with a course of prednisone and more azathioprine. We can monitor without an injection of Avastin, no prednisone, and this dose of Azathioprine  Eye pressure is normal in each eye   Recommend  additional testing: CBC, CMP in March. We will send you a message to get some in March.   Continue the same dose of Azathioprine 150 mg (2 pills of 50 mg in the morning and 50 mg in the PM)  No prednisone pills, no Avastin injections left eye     RTC Early April armando deng, OCT    Physician Attestation    Attending Physician Attestation:  Complete documentation of historical and exam elements from today's encounter can be found in the full encounter summary report (not reduplicated in this progress note). I personally obtained the chief complaint(s) and history of present illness. I confirmed and edited as necessary the review of systems, past medical/surgical history, family history, social history, and examination findings as documented by others; and I examined the patient myself. I personally reviewed the relevant tests, images, and reports as documented above. I formulated and edited as necessary the assessment and plan and discussed the findings and management plan with the patient and family members present at the time of this visit.  Sp Shipman M.D., Uveitis and Medical Retina, January 27, 2025     Again, thank you for allowing me to participate in the care of your patient.      Sincerely,    Sp Shipman MD  Uveitis and Medical Retina    Department of Ophthalmology & Visual Neurosciences  [facility]

## 2025-01-27 NOTE — PATIENT INSTRUCTIONS
Recommend additional testing: CBC, CMP in March. We will send you a message to get some in March.   Continue the same dose of Azathioprine 150 mg (2 pills of 50 mg in the morning and 50 mg in the PM)  No prednisone pills, no Avastin injections left eye

## 2025-01-27 NOTE — PROGRESS NOTES
Chief Complaint/Presenting Concern:  Uveitis follow up    Interval History of Present Ocular Illness:  Parul Veliz is a 64 year old patient who returns for follow up of her ampiginous choroiditis and CNV right eye. Last visit we saw some changes on the scans of the right eye and elected to increase Azathioprine and start a course of prednisone.    Parul reports more light sensitivity when working indoors moreso than at night. Prednisone was okay and azathioprine increase going okay    Interval Updates to Medical/Family/Social History:  No major changes    Relevant Review of Systems Updates:  No stomach issues    No new labs     Current eye related medications: Off prednisone, Azathioprine back on 2 pill (100 mg) in the AM and 1 pill (50 mg) in the PM     Retina/Uveitis Imaging:   OCT Spectralis Macula January 27, 2025  right eye: Focal areas of atrophy, no new spots, resolved subretinal fluid  left eye: Focal area of atrophy, no fluid, no new spots    Assessment:     1. 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 Ampiginous choroiditis of both eyes  3. Retinal macular atrophy - Both Eyes  No new spots, no new atrophy    4. High risk medication use  Azathioprine back on 2 pill (100 mg) in the AM and 1 pill (50 mg) in the PM. Total 150 mg daily    Plan/Recommendations:    Discussed findings with patient. The focal area of subretinal fluid in the right eye has resolved with a course of prednisone and more azathioprine. We can monitor without an injection of Avastin, no prednisone, and this dose of Azathioprine  Eye pressure is normal in each eye   Recommend additional testing: CBC, CMP in March. We will send you a message to get some in March.   Continue the same dose of Azathioprine 150 mg (2 pills of 50 mg in the morning and 50 mg in the PM)  No prednisone pills, no Avastin injections left eye     RTC Early April armando deng, OCT    Physician Attestation      Attending Physician Attestation:  Complete documentation of historical and exam elements from today's encounter can be found in the full encounter summary report (not reduplicated in this progress note). I personally obtained the chief complaint(s) and history of present illness. I confirmed and edited as necessary the review of systems, past medical/surgical history, family history, social history, and examination findings as documented by others; and I examined the patient myself. I personally reviewed the relevant tests, images, and reports as documented above. I formulated and edited as necessary the assessment and plan and discussed the findings and management plan with the patient and family members present at the time of this visit.  Sp Shipman M.D., Uveitis and Medical Retina, January 27, 2025

## 2025-01-27 NOTE — NURSING NOTE
Chief Complaints and History of Present Illnesses   Patient presents with    Retinitis/choroiditis Follow Up     Chief Complaint(s) and History of Present Illness(es)       Retinitis/choroiditis Follow Up              Laterality: both eyes    Onset: gradual    Onset: months ago    Quality: States va is the same since last visit      Severity: moderate    Frequency: intermittently    Associated symptoms: photophobia.  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

## 2025-03-27 ENCOUNTER — TRANSFERRED RECORDS (OUTPATIENT)
Dept: HEALTH INFORMATION MANAGEMENT | Facility: CLINIC | Age: 65
End: 2025-03-27
Payer: COMMERCIAL

## 2025-04-21 ENCOUNTER — OFFICE VISIT (OUTPATIENT)
Dept: OPHTHALMOLOGY | Facility: CLINIC | Age: 65
End: 2025-04-21
Attending: OPHTHALMOLOGY
Payer: COMMERCIAL

## 2025-04-21 DIAGNOSIS — Z79.899 HIGH RISK MEDICATION USE: ICD-10-CM

## 2025-04-21 DIAGNOSIS — H30.91 CHOROIDAL NEOVASCULARIZATION OF RIGHT EYE DUE TO CHORIORETINITIS: ICD-10-CM

## 2025-04-21 DIAGNOSIS — H35.051 CHOROIDAL NEOVASCULARIZATION OF RIGHT EYE DUE TO CHORIORETINITIS: ICD-10-CM

## 2025-04-21 DIAGNOSIS — H35.89 RETINAL MACULAR ATROPHY: ICD-10-CM

## 2025-04-21 DIAGNOSIS — H25.13 NUCLEAR SCLEROSIS OF BOTH EYES: ICD-10-CM

## 2025-04-21 DIAGNOSIS — H30.143 AMPIGINOUS CHOROIDITIS OF BOTH EYES: Primary | ICD-10-CM

## 2025-04-21 PROCEDURE — 92134 CPTRZ OPH DX IMG PST SGM RTA: CPT | Performed by: OPHTHALMOLOGY

## 2025-04-21 PROCEDURE — 99214 OFFICE O/P EST MOD 30 MIN: CPT | Performed by: OPHTHALMOLOGY

## 2025-04-21 RX ORDER — AZATHIOPRINE 50 MG/1
TABLET ORAL
Qty: 90 TABLET | Refills: 4 | Status: SHIPPED | OUTPATIENT
Start: 2025-04-21

## 2025-04-21 ASSESSMENT — REFRACTION_WEARINGRX
OD_CYLINDER: +1.25
OD_AXIS: 005
OS_CYLINDER: +1.00
OS_SPHERE: +1.25
SPECS_TYPE: COMPUTER
OD_SPHERE: +0.50
OS_AXIS: 020

## 2025-04-21 ASSESSMENT — CONF VISUAL FIELD
OS_INFERIOR_NASAL_RESTRICTION: 0
OS_SUPERIOR_TEMPORAL_RESTRICTION: 0
OD_SUPERIOR_TEMPORAL_RESTRICTION: 0
OD_SUPERIOR_NASAL_RESTRICTION: 0
OD_INFERIOR_TEMPORAL_RESTRICTION: 0
OD_INFERIOR_NASAL_RESTRICTION: 0
OS_SUPERIOR_NASAL_RESTRICTION: 0
OD_NORMAL: 1
OS_INFERIOR_TEMPORAL_RESTRICTION: 0
OS_NORMAL: 1
METHOD: COUNTING FINGERS

## 2025-04-21 ASSESSMENT — TONOMETRY
OD_IOP_MMHG: 14
OS_IOP_MMHG: 20
IOP_METHOD: TONOPEN

## 2025-04-21 ASSESSMENT — VISUAL ACUITY
OS_CC+: -1
CORRECTION_TYPE: GLASSES
OS_CC: 20/30
OD_CC+: +2
OD_CC: 20/30
OD_SC: 20/25
METHOD: SNELLEN - LINEAR

## 2025-04-21 ASSESSMENT — EXTERNAL EXAM - LEFT EYE: OS_EXAM: NORMAL

## 2025-04-21 ASSESSMENT — CUP TO DISC RATIO
OD_RATIO: 0.2
OS_RATIO: 0.2

## 2025-04-21 ASSESSMENT — SLIT LAMP EXAM - LIDS
COMMENTS: FLOPPY EYELIDS
COMMENTS: FLOPPY EYELIDS

## 2025-04-21 ASSESSMENT — EXTERNAL EXAM - RIGHT EYE: OD_EXAM: NORMAL

## 2025-04-21 NOTE — PATIENT INSTRUCTIONS
Continue Azathioprine 100 mg in the AM and 50 mg in the PM without changes  No prednisone pills, no Avastin eye injections for either eye  Okay to get checked for updated refraction given small change in

## 2025-04-21 NOTE — PROGRESS NOTES
Chief Complaint/Presenting Concern:  Uveitis follow up    Interval History of Present Ocular Illness:  Parul Veliz is a 64 year old patient who returns for follow up of her ampiginous choroiditis. Last visit we elected to monitor on azathioprine without prednisone     Parul notes that things are not as clear even in the morning at work. There is less definition when looking at things. Floater one time right eye but otherwise okay after that last week.    Interval Updates to Medical/Family/Social History:  No major changes    Relevant Review of Systems Updates:  Feeling well    Labs: 3/28/25: Blood count, liver/kidney function, and blood sugar are all normal.      Current eye related medications: Azathioprine 100 mg in the AM and 50 mg in the PM    Retina/Uveitis Imaging:   OCT Spectralis Macula April 21, 2025  right eye: Focal atrophy, no fluid. Degenerative cysts over atrophy. no PP fluid  left eye: Focal atrophy, no fluid. Cysts over atrophy, no PP fluid    Assessment:     1. Ampiginous choroiditis of both eyes (Primary)  NO active inflammation in either eye    2. 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 Retinal macular atrophy - Both Eyes  Stable in each eye     4. Nuclear sclerosis of both eyes  Minimal change     5. High risk medication use  Azathioprine 100 mg in the AM and 50 mg in the PM    Plan/Recommendations:    Discussed findings with patient. The eyes are doing well without inflammation or active blood vessel leakage. We can monitor on azathioprine at the current dose without prednisone pills or Avastin injections  Eye pressure is normal in each eye   Recommend additional testing: none needed given normal labs next month  Continue Azathioprine 100 mg in the AM and 50 mg in the PM without changes  No prednisone pills, no Avastin eye injections for either eye  Okay to get checked for updated refraction given small change in  lenses    RTC Late July tonopen, dilate, OCT, Photos, FA/ICG transit right eye (ordered)    Physician Attestation     Attending Physician Attestation:  Complete documentation of historical and exam elements from today's encounter can be found in the full encounter summary report (not reduplicated in this progress note). I personally obtained the chief complaint(s) and history of present illness. I confirmed and edited as necessary the review of systems, past medical/surgical history, family history, social history, and examination findings as documented by others; and I examined the patient myself. I personally reviewed the relevant tests, images, and reports as documented above. I formulated and edited as necessary the assessment and plan and discussed the findings and management plan with the patient and family members present at the time of this visit.  Sp Shipman M.D., Uveitis and Medical Retina, April 21, 2025

## 2025-04-21 NOTE — NURSING NOTE
Chief Complaints and History of Present Illnesses   Patient presents with    Chorioretinitis Follow Up     Chief Complaint(s) and History of Present Illness(es)       Chorioretinitis Follow Up              Laterality: right eye    Onset: gradual    Onset: months ago    Quality: Past 3-4 weeks the va has not been as crisp    Severity: mild    Frequency: infrequently    Associated symptoms: photophobia and floaters (in the OD last week only once).  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

## 2025-06-06 NOTE — LETTER
"6/4/2024       RE: Parul Veliz  [address] [address]     Dear Colleague,    Thank you for referring your patient, Parul Veliz, to the [facility] [place] at [facility]. Please see a copy of my visit note below.    Chief Complaint/Presenting Concern:  Uveitis follow up.    Interval History of Present Ocular Illness:  Parul Veliz is a 63 year old patient who returns for follow up of her ampiginous choroiditis. We last saw each other 2/9/24/ at which time things were inactive and we were continuing to monitor without Avastin injections nor adjustments to azathioprine.     Parul states no changes in vision since last visit. Light sensitivity is stable.     Interval Updates to Medical/Family/Social History:  February 2024 she went to the ED for \"chest tightness\" associated with shoulder and arm pain. Cardiology has ruled out any cardiac involvement and she is currently has a referral to Pulmonology but has not yet set up an appointment. She things overall the chest tightness has mostly resolved.      Relevant Review of Systems Updates:Still experiencing fatigue. Skin issues coming and going even with creams    Laboratory Testing  Normal/negative: 2/26/24: CBC,BMP WNL     Current eye related medications: Azathioprine 150 mg daily.     Retina/Uveitis Imaging:   OCT Spectralis Macula June 4, 2024  right eye: Broad shallow PED adjacent to area of focal retinal atrophy.  No fluid.  Stable.  No peripapillary fluid  left eye: Focal atrophy.  Some increase in overlying intraretinal cystoid degeneration but no true fluid. No peripapillary fluid.     Optos FA/ICG June 4, 2024  right eye: Normal transit.  Early small vessel leakage in the macula which is stable without leakage nor CNV activity  Stable peripheral small punctate hyperfluorescent spots in the periphery.  Some nasal micro aneurysmal type changes.  No disc " leakage  ICG with hypocyanescent spot in the macula and some in the periphery. Macula spots smaller and peripheral spots also improving  Left eye: Staining in the macula without leakage. Stable peripheral small punctate hyperfluorescent spots in the periphery. NO disc leakage  ICG with hypocyanescent spot in the macula and some in the periphery.  Macula spots smaller and peripheral spots also improving    Assessment:     1. Ampiginous choroiditis of both eyes  No clinically active inflammation, no new spots on angiography    2. 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 Retinal macular atrophy - Both Eyes  Stable right eye, some subtle degenerative cystoid changes over atrophic area in the macula of the left eye     4. High risk medication use  Azathioprine 150 mg daily    Plan/Recommendations:    Discussed findings with patient. The central inflammatory spots are inactive on OCT and improved on ICG imaging. The CNV in the right eye remains inactive there is only slight progression of the area of atrophy in the left eye. Given nearly 2 years on azathioprine (1.5 years at this dose) and no injection of Avastin in the right eye for the last year, we discussed slow interval reduction of Azathioprine dose and will adjust as below.  Eye pressure is normal in each eye.   Recommend additional testing: CBC, CMP this month or next month.  For the Azathioprine, let's reduce to 50 mg in the morning and 50 mg in the evening for a total of 100 mg daily.   No Prednisone pills, no eye injections needed for either eye.    RTC Late Sept-early October tonopen, dilate, OCT (ordered)    Physician Attestation    Attending Physician Attestation:  Complete documentation of historical and exam elements from today's encounter can be found in the full encounter summary report (not reduplicated in this progress note). I personally obtained the chief complaint(s) and history  of present illness. I confirmed and edited as necessary the review of systems, past medical/surgical history, family history, social history, and examination findings as documented by others; and I examined the patient myself. I personally reviewed the relevant tests, images, and reports as documented above. I formulated and edited as necessary the assessment and plan and discussed the findings and management plan with the patient and family members present at the time of this visit.  Sp Shipman M.D., Uveitis and Medical Retina, June 4, 2024       Again, thank you for allowing me to participate in the care of your patient.      Sincerely,    Sp Shipman MD  [facility] Dept of Ophthalmology  Uveitis and Medical Retina         bagel w/cream cheese brought in by family (4 bites)/regular solid 4 oz water/thin liquid

## 2025-07-14 ENCOUNTER — OFFICE VISIT (OUTPATIENT)
Dept: OPHTHALMOLOGY | Facility: CLINIC | Age: 65
End: 2025-07-14
Attending: OPHTHALMOLOGY
Payer: COMMERCIAL

## 2025-07-14 DIAGNOSIS — H30.143 AMPIGINOUS CHOROIDITIS OF BOTH EYES: Primary | ICD-10-CM

## 2025-07-14 DIAGNOSIS — H30.91 CHOROIDAL NEOVASCULARIZATION OF RIGHT EYE DUE TO CHORIORETINITIS: ICD-10-CM

## 2025-07-14 DIAGNOSIS — Z79.899 HIGH RISK MEDICATION USE: ICD-10-CM

## 2025-07-14 DIAGNOSIS — H25.13 NUCLEAR SCLEROSIS OF BOTH EYES: ICD-10-CM

## 2025-07-14 DIAGNOSIS — H35.89 RETINAL MACULAR ATROPHY: ICD-10-CM

## 2025-07-14 DIAGNOSIS — H35.051 CHOROIDAL NEOVASCULARIZATION OF RIGHT EYE DUE TO CHORIORETINITIS: ICD-10-CM

## 2025-07-14 PROCEDURE — 99207 FUNDUS PHOTOS OU (BOTH EYES): CPT | Mod: 26 | Performed by: OPHTHALMOLOGY

## 2025-07-14 PROCEDURE — 99214 OFFICE O/P EST MOD 30 MIN: CPT | Performed by: OPHTHALMOLOGY

## 2025-07-14 PROCEDURE — 92250 FUNDUS PHOTOGRAPHY W/I&R: CPT | Performed by: OPHTHALMOLOGY

## 2025-07-14 PROCEDURE — 92134 CPTRZ OPH DX IMG PST SGM RTA: CPT | Performed by: OPHTHALMOLOGY

## 2025-07-14 PROCEDURE — 99213 OFFICE O/P EST LOW 20 MIN: CPT | Performed by: OPHTHALMOLOGY

## 2025-07-14 RX ORDER — FEXOFENADINE HCL 180 MG/1
180 TABLET ORAL DAILY
COMMUNITY

## 2025-07-14 RX ORDER — AZATHIOPRINE 50 MG/1
TABLET ORAL
Qty: 90 TABLET | Refills: 5 | Status: SHIPPED | OUTPATIENT
Start: 2025-07-14

## 2025-07-14 ASSESSMENT — EXTERNAL EXAM - RIGHT EYE: OD_EXAM: NORMAL

## 2025-07-14 ASSESSMENT — CONF VISUAL FIELD
OD_INFERIOR_NASAL_RESTRICTION: 0
OD_INFERIOR_TEMPORAL_RESTRICTION: 0
OS_NORMAL: 1
OS_SUPERIOR_NASAL_RESTRICTION: 0
OD_SUPERIOR_TEMPORAL_RESTRICTION: 0
METHOD: COUNTING FINGERS
OS_SUPERIOR_TEMPORAL_RESTRICTION: 0
OD_NORMAL: 1
OD_SUPERIOR_NASAL_RESTRICTION: 0
OS_INFERIOR_NASAL_RESTRICTION: 0
OS_INFERIOR_TEMPORAL_RESTRICTION: 0

## 2025-07-14 ASSESSMENT — REFRACTION_WEARINGRX
OD_AXIS: 005
OS_SPHERE: +1.25
OS_AXIS: 020
OS_CYLINDER: +1.00
OD_SPHERE: +0.50
SPECS_TYPE: COMPUTER
OD_CYLINDER: +1.25

## 2025-07-14 ASSESSMENT — VISUAL ACUITY
OS_PH_SC+: -2
OD_SC+: -3
OD_SC: 20/25
OS_SC+: -2
OS_PH_SC: 20/30
METHOD: SNELLEN - LINEAR
OS_SC: 20/40

## 2025-07-14 ASSESSMENT — TONOMETRY
OD_IOP_MMHG: 14
OS_IOP_MMHG: 15
IOP_METHOD: TONOPEN

## 2025-07-14 ASSESSMENT — EXTERNAL EXAM - LEFT EYE: OS_EXAM: NORMAL

## 2025-07-14 ASSESSMENT — CUP TO DISC RATIO
OD_RATIO: 0.2
OS_RATIO: 0.2

## 2025-07-14 ASSESSMENT — SLIT LAMP EXAM - LIDS
COMMENTS: NORMAL
COMMENTS: NORMAL

## 2025-07-14 NOTE — PATIENT INSTRUCTIONS
Recommend additional testing: CBC, CMP to be done when you get labs at work in a few weeks or when possible at [facility]  Continue Azathioprine 100 mg in the morning and 50 mg in the evening. When you get the tetanus vaccine, okay to hold Azathioprine the day before, day of and three days after (5 days total). Then resume afterward  No prednisone pills, no Avastin injections

## 2025-07-14 NOTE — LETTER
7/14/2025       RE: Parul Veliz  [address] [facility] [address]     Dear Colleague,    Thank you for referring your patient, Parul Veliz, to the [facility] [place] at [facility]. Please see a copy of my visit note below.    Chief Complaint/Presenting Concern:  Uveitis follow up    Interval History of Present Ocular Illness:  Parul Veliz is a 65 year old patient who returns for follow up of her ampiginous choroiditis. Last visit we elected to monitor on the same dose of azathioprine.    Parul notes the eyes do get tired and the light sensitivity especially at the end of the day.     Interval Updates to Medical/Family/Social History:    Still working from home and things have been busy.     Relevant Review of Systems Updates:  No major changes other than allergies.      Current eye related medications: Azathioprine 100 mg in the AM and 50 mg in the PM    Retina/Uveitis Imaging:   OCT Spectralis Macula July 14, 2025  right eye: Superior area of atrophy stable, PED stable, no fluid. Inferior area of atrophy with few more overlying cysts. No PP fluid  left eye: Broad shallow PED, stable overlying degenerative cysts over atrophy, No PP fluid    Optos Fundus Photos OU (both eyes) July 14, 2025  right eye:No new spots, stable central atrophy, and focal inferior atrophy. All stable  left eye: Macular mottling, no active spots, stable atrophy        Assessment:     1. Ampiginous choroiditis of both eyes (Primary)  Doing great    2. 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 Retinal macular atrophy - Both Eyes  Minimal change right eye, stable left eye     4. Nuclear sclerosis of both eyes  Some change left eye     5. High risk medication use  Azathioprine 100 mg in the AM and 50 mg in the PM    Plan/Recommendations:    Discussed findings with patient and her father. The eyes are doing  well without new spots. We tried angiography and elected to defer today given stability. We can monitor on the same dose of Azathioprine without other treatments  Eye pressure is normal in each eye   Recommend additional testing: CBC, CMP to be done when you get labs at work in a few weeks or when possible at [facility]  Continue Azathioprine 100 mg in the morning and 50 mg in the evening. When you get the tetanus vaccine, okay to hold Azathioprine the day before, day of and three days after (5 days total). Then resume afterward  No prednisone pills, no Avastin injections    RTC Late October-early November armando deng, OCT (ordered)    Physician Attestation    Attending Physician Attestation:  Complete documentation of historical and exam elements from today's encounter can be found in the full encounter summary report (not reduplicated in this progress note). I personally obtained the chief complaint(s) and history of present illness. I confirmed and edited as necessary the review of systems, past medical/surgical history, family history, social history, and examination findings as documented by others; and I examined the patient myself. I personally reviewed the relevant tests, images, and reports as documented above. I formulated and edited as necessary the assessment and plan and discussed the findings and management plan with the patient and family members present at the time of this visit.  Sp Shipman M.D., Uveitis and Medical Retina, July 14, 2025     Again, thank you for allowing me to participate in the care of your patient.      Sincerely,    Sp Shipman MD  Uveitis and Medical Retina    Department of Ophthalmology & Visual Neurosciences  [facility]

## 2025-07-14 NOTE — PROGRESS NOTES
Chief Complaint/Presenting Concern:  Uveitis follow up    Interval History of Present Ocular Illness:  Parul Veliz is a 65 year old patient who returns for follow up of her ampiginous choroiditis. Last visit we elected to monitor on the same dose of azathioprine.    Parul notes the eyes do get tired and the light sensitivity especially at the end of the day.     Interval Updates to Medical/Family/Social History:    Still working from home and things have been busy.     Relevant Review of Systems Updates:  No major changes other than allergies.      Current eye related medications: Azathioprine 100 mg in the AM and 50 mg in the PM    Retina/Uveitis Imaging:   OCT Spectralis Macula July 14, 2025  right eye: Superior area of atrophy stable, PED stable, no fluid. Inferior area of atrophy with few more overlying cysts. No PP fluid  left eye: Broad shallow PED, stable overlying degenerative cysts over atrophy, No PP fluid    Optos Fundus Photos OU (both eyes) July 14, 2025  right eye:No new spots, stable central atrophy, and focal inferior atrophy. All stable  left eye: Macular mottling, no active spots, stable atrophy      Assessment:     1. Ampiginous choroiditis of both eyes (Primary)  Doing great    2. 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 Retinal macular atrophy - Both Eyes  Minimal change right eye, stable left eye     4. Nuclear sclerosis of both eyes  Some change left eye     5. High risk medication use  Azathioprine 100 mg in the AM and 50 mg in the PM    Plan/Recommendations:    Discussed findings with patient and her Father. The eyes are doing well without new spots. We tried angiography and elected to defer today given stability. We can monitor on the same dose of Azathioprine without other treatments  Eye pressure is normal in each eye   Recommend additional testing: CBC, CMP to be done when you get labs at work in a few weeks or when possible at  [facility]  Continue Azathioprine 100 mg in the morning and 50 mg in the evening. When you get the tetanus vaccine, okay to hold Azathioprine the day before, day of and three days after (5 days total). Then resume afterward  No prednisone pills, no Avastin injections    RTC Late October-early November armando deng, OCT (ordered)    Physician Attestation     Attending Physician Attestation:  Complete documentation of historical and exam elements from today's encounter can be found in the full encounter summary report (not reduplicated in this progress note). I personally obtained the chief complaint(s) and history of present illness. I confirmed and edited as necessary the review of systems, past medical/surgical history, family history, social history, and examination findings as documented by others; and I examined the patient myself. I personally reviewed the relevant tests, images, and reports as documented above. I formulated and edited as necessary the assessment and plan and discussed the findings and management plan with the patient and family members present at the time of this visit.  Sp Shipman M.D., Uveitis and Medical Retina, July 14, 2025

## 2025-08-23 ENCOUNTER — HEALTH MAINTENANCE LETTER (OUTPATIENT)
Age: 65
End: 2025-08-23

## (undated) RX ORDER — FENTANYL CITRATE 50 UG/ML
INJECTION, SOLUTION INTRAMUSCULAR; INTRAVENOUS
Status: DISPENSED
Start: 2022-05-26